# Patient Record
Sex: FEMALE | Race: WHITE | NOT HISPANIC OR LATINO | Employment: FULL TIME | URBAN - METROPOLITAN AREA
[De-identification: names, ages, dates, MRNs, and addresses within clinical notes are randomized per-mention and may not be internally consistent; named-entity substitution may affect disease eponyms.]

---

## 2017-01-09 ENCOUNTER — OFFICE VISIT (OUTPATIENT)
Dept: INTERNAL MEDICINE | Facility: CLINIC | Age: 52
End: 2017-01-09

## 2017-01-09 VITALS
OXYGEN SATURATION: 97 % | HEART RATE: 74 BPM | HEIGHT: 64 IN | DIASTOLIC BLOOD PRESSURE: 90 MMHG | BODY MASS INDEX: 29.59 KG/M2 | SYSTOLIC BLOOD PRESSURE: 132 MMHG | WEIGHT: 173.3 LBS

## 2017-01-09 DIAGNOSIS — J32.0 CHRONIC MAXILLARY SINUSITIS: Primary | ICD-10-CM

## 2017-01-09 DIAGNOSIS — Z00.00 HEALTHCARE MAINTENANCE: ICD-10-CM

## 2017-01-09 PROCEDURE — 99214 OFFICE O/P EST MOD 30 MIN: CPT | Performed by: INTERNAL MEDICINE

## 2017-01-09 RX ORDER — FLUTICASONE PROPIONATE 50 MCG
2 SPRAY, SUSPENSION (ML) NASAL DAILY
Qty: 1 EACH | Refills: 2 | Status: SHIPPED | OUTPATIENT
Start: 2017-01-09 | End: 2017-02-08

## 2017-01-09 NOTE — MR AVS SNAPSHOT
Neville Antelmo   1/9/2017 3:40 PM   Office Visit    Dept Phone:  [phone]   Encounter #:  [id number]    Provider:  Meryl Mesa MD   Department:  [facility] INTERNAL MED AND PEDS                Your Full Care Plan              Today's Medication Changes          These changes are accurate as of: 1/9/17  4:43 PM.  If you have any questions, ask your nurse or doctor.               New Medication(s)Ordered:     fluticasone 50 MCG/ACT nasal spray   Commonly known as:  FLONASE   2 sprays into each nostril Daily for 30 days. Administer 2 sprays in each nostril for each dose.   Started by:  Meryl Mesa MD         Stop taking medication(s)listed here:     amoxicillin-clavulanate 875-125 MG per tablet   Commonly known as:  AUGMENTIN   Stopped by:  Meryl Mesa MD           chlorthalidone 25 MG tablet   Commonly known as:  HYGROTON   Stopped by:  Meryl Mesa MD           montelukast 10 MG tablet   Commonly known as:  SINGULAIR   Stopped by:  Meryl Mesa MD           ondansetron 8 MG tablet   Commonly known as:  ZOFRAN   Stopped by:  Meryl Mesa MD                Where to Get Your Medications      These medications were sent to [address] [place] [address] - [phone] [facility] [phone]   [address] [address]     Phone:  [phone]     fluticasone 50 MCG/ACT nasal spray                  Your Updated Medication List          This list is accurate as of: 1/9/17  4:43 PM.  Always use your most recent med list.                albuterol 108 (90 BASE) MCG/ACT inhaler   Commonly known as:  PROVENTIL HFA;VENTOLIN HFA       ALPRAZolam XR 0.5 MG 24 hr tablet   Commonly known as:  XANAX XR   Take 1 tablet by mouth daily.       buPROPion  MG 24 hr tablet   Commonly known as:  WELLBUTRIN XL       fluticasone 50 MCG/ACT nasal spray   Commonly known as:  FLONASE   2 sprays into each nostril Daily for  "30 days. Administer 2 sprays in each nostril for each dose.       losartan-hydrochlorothiazide 100-12.5 MG per tablet   Commonly known as:  HYZAAR   TAKE 1 TABLET DAILY       venlafaxine XR 75 MG 24 hr capsule   Commonly known as:  EFFEXOR-XR               You Were Diagnosed With        Codes Comments    Chronic maxillary sinusitis    -  Primary ICD-10-CM: J32.0  ICD-9-CM: 473.0     Healthcare maintenance     ICD-10-CM: Z00.00  ICD-9-CM: V70.0       Instructions     None    Patient Instructions History      Upcoming Appointments     Visit Type Date Time Department    SAME DAY 2017  3:40 PM [facility]      Box[employer] Signup     [facility] allows you to send messages to your doctor, view your test results, renew your prescriptions, schedule appointments, and more. To sign up, go to [employer] and click on the Sign Up Now link in the New User? box. Enter your Box[employer] Activation Code exactly as it appears below along with the last four digits of your Social Security Number and your Date of Birth () to complete the sign-up process. If you do not sign up before the expiration date, you must request a new code.    BoxC Activation Code: [email or web address]  Expires: 2017  4:43 PM    If you have questions, you can email [email or web address] or call [phone] to talk to our Box[facility] staff. Remember, [facility] is NOT to be used for urgent needs. For medical emergencies, dial 911.               Other Info from Your Visit           Allergies     Ceftin [Cefuroxime Axetil]      Paroxetine      Ciprofloxacin  Rash    Metronidazole  Rash      Reason for Visit     Sinusitis X1-2months. Constant headache, either completely stopped up or runny nose. No fever.      Vital Signs     Blood Pressure Pulse Height Weight Oxygen Saturation Body Mass Index    132/90 (BP Location: Left arm, Patient Position: Sitting, Cuff Size: Adult) 74 64\" (162.6 cm) 173 lb 4.8 oz (78.6 kg) 97% 29.75 " kg/m2    Smoking Status                   Never Smoker           Problems and Diagnoses Noted     Chronic maxillary sinusitis    -  Primary    Routine medical exam

## 2017-01-09 NOTE — PROGRESS NOTES
Subjective     Neville Rodriges is a 51 y.o. female, who presents with a chief complaint of   Chief Complaint   Patient presents with   • Sinusitis     X1-2months. Constant headache, either completely stopped up or runny nose. No fever.       HPI   The pt has had headache and congestion x 2 mo.  Right now she has lots of PND.  She coughs up a lot in the morning and then it is clear throughout the day.  The neti pot helps sometimes.  She hasnt tried other otc meds.  She doesn't want to take abx if she doesn have to.  No cough or wheeze    Her last mammo was 5 years ago.    Her last c-scope was about 3 years ago and she is due for repeat.  She will call to check on this.     The following portions of the patient's history were reviewed and updated as appropriate: allergies, current medications, past family history, past medical history, past social history, past surgical history and problem list.    Allergies: Ceftin [cefuroxime axetil]; Paroxetine; Ciprofloxacin; and Metronidazole    Review of Systems   Constitutional: Negative.    HENT: Negative.    Eyes: Negative.    Respiratory: Negative.    Cardiovascular: Negative.    Gastrointestinal: Negative.    Endocrine: Negative.    Genitourinary: Negative.    Musculoskeletal: Negative.    Skin: Negative.    Allergic/Immunologic: Negative.    Neurological: Negative.    Hematological: Negative.    Psychiatric/Behavioral: Negative.    All other systems reviewed and are negative.      Objective     Wt Readings from Last 3 Encounters:   01/09/17 173 lb 4.8 oz (78.6 kg)   03/22/16 165 lb 6.4 oz (75 kg)   02/23/16 162 lb 3.2 oz (73.6 kg)     Temp Readings from Last 3 Encounters:   03/22/16 98.4 °F (36.9 °C)   02/23/16 98.3 °F (36.8 °C)   12/02/15 98.2 °F (36.8 °C) (Oral)     BP Readings from Last 3 Encounters:   01/09/17 132/90   03/22/16 122/80   02/23/16 118/76     Pulse Readings from Last 3 Encounters:   01/09/17 74   03/22/16 98   02/23/16 79     Body mass index is 29.75  kg/(m^2).    Physical Exam   Constitutional: She is oriented to person, place, and time. She appears well-developed and well-nourished.   HENT:   Head: Normocephalic and atraumatic.   Right Ear: External ear normal.   Left Ear: External ear normal.   Bilateral serous effusion without erythema   Eyes: Conjunctivae and lids are normal.   Neck: Normal range of motion. Neck supple.   Cardiovascular: Normal rate and regular rhythm.    Pulmonary/Chest: Effort normal and breath sounds normal. No respiratory distress. She has no wheezes.   Musculoskeletal: Normal range of motion. She exhibits no edema.   Lymphadenopathy:     She has cervical adenopathy.   Neurological: She is alert and oriented to person, place, and time. No cranial nerve deficit.   Skin: Skin is warm and dry. No rash noted.   Psychiatric: She has a normal mood and affect. Her behavior is normal. Thought content normal.   Nursing note and vitals reviewed.        Results for orders placed or performed in visit on 09/29/15   Allergens w/Total IgE Area 5   Result Value Ref Range    Class Description      IgE <1 0 - 100 IU/mL    D. pteronyssinus (dust mite) <0.10 Class 0 kU/L    D. farinae (dust mite) <0.10 Class 0 kU/L    Cat Dander <0.10 Class 0 kU/L    Dog Dander, IgE <0.10 Class 0 kU/L    Berm[place] Grass <0.10 Class 0 kU/L    Tez Grass <0.10 Class 0 kU/L    Cockroach,Malaysian <0.10 Class 0 kU/L    Penicillium chrysogen <0.10 Class 0 kU/L    Cladosporium herbarum <0.10 Class 0 kU/L    Aspergillus fumigatus <0.10 Class 0 kU/L    Alternaria alternata <0.10 Class 0 kU/L    Maple/[place] <0.10 Class 0 kU/L    Birch, Silver <0.10 Class 0 kU/L    [place], Mountain <0.10 Class 0 kU/L    [place], White <0.10 Class 0 kU/L    Elm, American <0.10 Class 0 kU/L    Shoals, Pollen <0.10 Class 0 kU/L    Maple [place] <0.10 Class 0 kU/L    [place] Tree <0.10 Class 0 kU/L    [place], White <0.10 Class 0 kU/L    Pecan/[place] Tree <0.10 Class 0 kU/L    White [place] <0.10  Class 0 kU/L    Ragweed, Common/Short <0.10 Class 0 kU/L    Russian Thistle <0.10 Class 0 kU/L    Pigweed, Rough/Common <0.10 Class 0 kU/L    Sheep Sorrel <0.10 Class 0 kU/L    Mouse Urine <0.10 Class 0 kU/L   CBC auto differential   Result Value Ref Range    WBC 4.89 4.80 - 10.80 K/cumm    RBC 4.77 4.20 - 5.40 Million    Hemoglobin 14.1 12.0 - 16.0 g/dL    Hematocrit 44.0 37.0 - 47.0 %    MCV 92.2 81.0 - 99.0 fL    MCH 29.6 27.0 - 31.0 pg    MCHC 32.0 31.0 - 37.0 g/dL    RDW 12.3 11.5 - 14.5 %    Platelets 243 140 - 500 K/cumm    Neutrophil Rel % 56 45 - 70 %    Lymphocyte Rel % 33 20 - 45 %    Monocyte Rel % 8 3 - 8 %    Eosinophil Rel % 2 0 - 4 %    Basophil Rel % 1 0 - 2 %    Neutrophils Absolute 2.72 1.50 - 8.30 K/cumm    Lymphocytes Absolute 1.61 0.60 - 4.80 K/cumm    Monocytes Absolute 0.39 0.00 - 1.00 K/cumm    Eosinophils Absolute 0.10 0.10 - 0.30 K/cumm    Basophils Absolute 0.06 0.00 - 0.20 K/cumm    Immature Granulocyte Rel % 0.0 0.0 - 0.6 %   Comprehensive metabolic panel   Result Value Ref Range    Glucose 84 65 - 99 mg/dL    BUN 13 6 - 20 mg/dL    Creatinine 0.73 0.57 - 1.00 mg/dL    eGFR Non African Am >60 mL/min/1.732    eGFR African Am >60 mL/min/1.732    BUN/Creatinine Ratio 18     Sodium 142 136 - 145 mmol/L    Potassium 4.9 3.5 - 5.2 mmol/L    Chloride 103 98 - 107 mmol/L    Total CO2 29 22 - 29 mmol/L    Calcium 8.8 8.6 - 10.5 mg/dL    Total Protein 6.6 6.0 - 8.5 g/dL    Albumin 4.4 3.5 - 5.2 g/dL    Globulin 2.2 g/dL    A/G Ratio 2     Total Bilirubin 0.4 0.1 - 1.2 mg/dL    Alkaline Phosphatase 69 40 - 129 U/L    AST (SGOT) 17 5 - 32 U/L    ALT (SGPT) 12 5 - 33 U/L   Lipid panel   Result Value Ref Range    Total Cholesterol 187 0 - 200 mg/dL    Triglycerides 139 0 - 150 mg/dL    HDL Cholesterol 52 40 - 60 mg/dL    VLDL Cholesterol 28 (H) 7 - 27 mg/dL    LDL Cholesterol  107 (H) 0 - 100 mg/dL   Vitamin D 25 hydroxy   Result Value Ref Range    25 Hydroxy, Vitamin D 30.1 30.0 - 100.0 ng/mL   TSH  Rfx on Abnormal to Free T4 ([facility])   Result Value Ref Range    TSH 2.130 0.270 - 4.200 uIU/mL       Assessment/Plan   Neville was seen today for sinusitis.    Diagnoses and all orders for this visit:    Chronic maxillary sinusitis  -     fluticasone (FLONASE) 50 MCG/ACT nasal spray; 2 sprays into each nostril Daily for 30 days. Administer 2 sprays in each nostril for each dose.    Add zyrtec/benadryl and sudafed PRN.  If no better in 1 week treat with antibiotics.     Healthcare maintenance  -     Mammo Screening Bilateral With CAD; Future          Current Outpatient Prescriptions:   •  albuterol (PROVENTIL HFA;VENTOLIN HFA) 108 (90 BASE) MCG/ACT inhaler, Ventolin  (90 Base) MCG/ACT Inhalation Aerosol Solution; Patient Sig: Ventolin  (90 Base) MCG/ACT Inhalation Aerosol Solution INHALE 2-4 PUFFS Q 4 HOURS PRN/COUGH/WHEEZE; 1; 0; 21-Aug-2015; Active, Disp: , Rfl:   •  ALPRAZolam XR (XANAX XR) 0.5 MG 24 hr tablet, Take 1 tablet by mouth daily., Disp: 30 tablet, Rfl: 0  •  buPROPion XL (WELLBUTRIN XL) 150 MG 24 hr tablet, Take 1 tablet by mouth daily., Disp: , Rfl:   •  losartan-hydrochlorothiazide (HYZAAR) 100-12.5 MG per tablet, TAKE 1 TABLET DAILY, Disp: 90 tablet, Rfl: 2  •  venlafaxine XR (EFFEXOR-XR) 75 MG 24 hr capsule, Take 1 capsule by mouth daily., Disp: , Rfl:   •  fluticasone (FLONASE) 50 MCG/ACT nasal spray, 2 sprays into each nostril Daily for 30 days. Administer 2 sprays in each nostril for each dose., Disp: 1 each, Rfl: 2  Medications Discontinued During This Encounter   Medication Reason   • amoxicillin-clavulanate (AUGMENTIN) 875-125 MG per tablet    • chlorthalidone (HYGROTEN) 25 MG tablet    • montelukast (SINGULAIR) 10 MG tablet    • ondansetron (ZOFRAN) 8 MG tablet        Return if symptoms worsen or fail to improve.

## 2017-01-23 ENCOUNTER — TELEPHONE (OUTPATIENT)
Dept: INTERNAL MEDICINE | Facility: CLINIC | Age: 52
End: 2017-01-23

## 2017-01-23 RX ORDER — DOXYCYCLINE HYCLATE 100 MG/1
100 TABLET, DELAYED RELEASE ORAL 2 TIMES DAILY
Qty: 20 TABLET | Refills: 0 | Status: SHIPPED | OUTPATIENT
Start: 2017-01-23 | End: 2017-02-28

## 2017-01-23 NOTE — TELEPHONE ENCOUNTER
Patient calls this AM, she is requesting Cipro. She says she was seen last week for Sinusitis and took her meds but she is not any better. Per Dr. Mesa, Cipro not appropriate for Sinusitis, can have Doxycycline, amoxicillin, or z-nguyen. Patient would like to have Doxycycline. Sent to [facility] in [place].

## 2017-01-26 ENCOUNTER — TRANSCRIBE ORDERS (OUTPATIENT)
Dept: INTERNAL MEDICINE | Facility: CLINIC | Age: 52
End: 2017-01-26

## 2017-01-26 DIAGNOSIS — Z13.9 SCREENING: Primary | ICD-10-CM

## 2017-01-31 ENCOUNTER — APPOINTMENT (OUTPATIENT)
Dept: MAMMOGRAPHY | Facility: HOSPITAL | Age: 52
End: 2017-01-31
Attending: INTERNAL MEDICINE

## 2017-02-01 ENCOUNTER — HOSPITAL ENCOUNTER (OUTPATIENT)
Dept: MAMMOGRAPHY | Facility: HOSPITAL | Age: 52
Discharge: HOME | End: 2017-02-01
Attending: INTERNAL MEDICINE | Admitting: INTERNAL MEDICINE

## 2017-02-01 DIAGNOSIS — Z13.9 SCREENING: ICD-10-CM

## 2017-02-01 PROCEDURE — 77063 BREAST TOMOSYNTHESIS BI: CPT

## 2017-02-01 PROCEDURE — G0202 SCR MAMMO BI INCL CAD: HCPCS

## 2017-02-10 ENCOUNTER — TELEPHONE (OUTPATIENT)
Dept: INTERNAL MEDICINE | Facility: CLINIC | Age: 52
End: 2017-02-10

## 2017-02-10 NOTE — TELEPHONE ENCOUNTER
----- Message from Meryl Mesa MD sent at 2/9/2017 11:16 PM EST -----  Mammogram normal.  Repeat 1 year

## 2017-02-28 ENCOUNTER — OFFICE VISIT (OUTPATIENT)
Dept: INTERNAL MEDICINE | Facility: CLINIC | Age: 52
End: 2017-02-28

## 2017-02-28 ENCOUNTER — OFFICE VISIT (OUTPATIENT)
Dept: CARDIOLOGY | Facility: CLINIC | Age: 52
End: 2017-02-28

## 2017-02-28 VITALS
BODY MASS INDEX: 29.53 KG/M2 | HEART RATE: 80 BPM | SYSTOLIC BLOOD PRESSURE: 122 MMHG | HEIGHT: 64 IN | OXYGEN SATURATION: 98 % | DIASTOLIC BLOOD PRESSURE: 74 MMHG | WEIGHT: 173 LBS

## 2017-02-28 VITALS
SYSTOLIC BLOOD PRESSURE: 126 MMHG | HEIGHT: 64 IN | DIASTOLIC BLOOD PRESSURE: 84 MMHG | BODY MASS INDEX: 29.53 KG/M2 | HEART RATE: 70 BPM | WEIGHT: 173 LBS

## 2017-02-28 DIAGNOSIS — R00.2 PALPITATIONS: Primary | ICD-10-CM

## 2017-02-28 DIAGNOSIS — I10 ESSENTIAL HYPERTENSION: ICD-10-CM

## 2017-02-28 DIAGNOSIS — Z00.00 HEALTHCARE MAINTENANCE: ICD-10-CM

## 2017-02-28 DIAGNOSIS — H65.91 MIDDLE EAR EFFUSION, RIGHT: Primary | ICD-10-CM

## 2017-02-28 DIAGNOSIS — K21.9 GASTROESOPHAGEAL REFLUX DISEASE, ESOPHAGITIS PRESENCE NOT SPECIFIED: ICD-10-CM

## 2017-02-28 PROCEDURE — 93000 ELECTROCARDIOGRAM COMPLETE: CPT | Performed by: INTERNAL MEDICINE

## 2017-02-28 PROCEDURE — 99214 OFFICE O/P EST MOD 30 MIN: CPT | Performed by: INTERNAL MEDICINE

## 2017-02-28 RX ORDER — CARVEDILOL 6.25 MG/1
6.25 TABLET ORAL
COMMUNITY
End: 2017-12-04

## 2017-02-28 RX ORDER — RANITIDINE 150 MG/1
150 TABLET ORAL 2 TIMES DAILY
Qty: 180 TABLET | Refills: 1 | Status: SHIPPED | OUTPATIENT
Start: 2017-02-28 | End: 2017-10-17 | Stop reason: HOSPADM

## 2017-02-28 RX ORDER — AZELASTINE HCL 205.5 UG/1
2 SPRAY NASAL 2 TIMES DAILY
Qty: 30 ML | Refills: 1 | Status: SHIPPED | OUTPATIENT
Start: 2017-02-28 | End: 2023-01-11

## 2017-02-28 NOTE — PROGRESS NOTES
Subjective     Neville Rodriges is a 51 y.o. female, who presents with a chief complaint of   Chief Complaint   Patient presents with   • Earache     Was seen 3 weeks ago for a sinus infection, she has taken all meds and she is still having problems with her r ear.       HPI   The pt had a sinus infection a few weeks ago.  Her sinuses feel better, but her ear is still stopped up.  No fever.  She is on zyrtec daily.       Gerd - she is on a PPI and concerned about long term effects on her kidneys.    htn - well controlled.    The following portions of the patient's history were reviewed and updated as appropriate: allergies, current medications, past family history, past medical history, past social history, past surgical history and problem list.    Allergies: Ceftin [cefuroxime axetil]; Paroxetine; Ciprofloxacin; and Metronidazole    Review of Systems   Constitutional: Negative.    HENT: Positive for ear pain.    Eyes: Negative.    Respiratory: Negative.    Cardiovascular: Negative.    Gastrointestinal: Negative.    Endocrine: Negative.    Genitourinary: Negative.    Musculoskeletal: Negative.    Skin: Negative.    Allergic/Immunologic: Negative.    Neurological: Negative.    Hematological: Negative.    Psychiatric/Behavioral: Negative.    All other systems reviewed and are negative.      Objective     Wt Readings from Last 3 Encounters:   02/28/17 173 lb (78.5 kg)   01/09/17 173 lb 4.8 oz (78.6 kg)   03/22/16 165 lb 6.4 oz (75 kg)     Temp Readings from Last 3 Encounters:   03/22/16 98.4 °F (36.9 °C)   02/23/16 98.3 °F (36.8 °C)   12/02/15 98.2 °F (36.8 °C) (Oral)     BP Readings from Last 3 Encounters:   02/28/17 122/74   01/09/17 132/90   03/22/16 122/80     Pulse Readings from Last 3 Encounters:   02/28/17 80   01/09/17 74   03/22/16 98     Body mass index is 29.7 kg/(m^2).  SpO2 Readings from Last 3 Encounters:   02/28/17 98%   01/09/17 97%   02/23/16 98%       Physical Exam   Constitutional: She is oriented  to person, place, and time. She appears well-developed and well-nourished. No distress.   HENT:   Head: Normocephalic and atraumatic.   Right Ear: External ear normal.   Left Ear: External ear normal.   Nose: Nose normal.   Mouth/Throat: Oropharynx is clear and moist.   Right ear with effusion   Eyes: Conjunctivae and EOM are normal. Pupils are equal, round, and reactive to light.   Neck: Normal range of motion. Neck supple.   Cardiovascular: Normal rate, regular rhythm, normal heart sounds and intact distal pulses.    Pulmonary/Chest: Effort normal and breath sounds normal. No respiratory distress. She has no wheezes.   Musculoskeletal: Normal range of motion.   Normal gait   Neurological: She is alert and oriented to person, place, and time.   Skin: Skin is warm and dry.   Psychiatric: She has a normal mood and affect. Her behavior is normal. Judgment and thought content normal.   Nursing note and vitals reviewed.      Results for orders placed or performed in visit on 09/29/15   Allergens w/Total IgE Area 5   Result Value Ref Range    Class Description      IgE <1 0 - 100 IU/mL    D. pteronyssinus (dust mite) <0.10 Class 0 kU/L    D. farinae (dust mite) <0.10 Class 0 kU/L    Cat Dander <0.10 Class 0 kU/L    Dog Dander, IgE <0.10 Class 0 kU/L    [place] Grass <0.10 Class 0 kU/L    Tez Grass <0.10 Class 0 kU/L    Cockroach,S[place] Arabian <0.10 Class 0 kU/L    Penicillium chrysogen <0.10 Class 0 kU/L    Cladosporium herbarum <0.10 Class 0 kU/L    Aspergillus fumigatus <0.10 Class 0 kU/L    Alternaria alternata <0.10 Class 0 kU/L    Maple/[place] <0.10 Class 0 kU/L    Birch, Silver <0.10 Class 0 kU/L    [place], Mountain <0.10 Class 0 kU/L    [place], White <0.10 Class 0 kU/L    Elm, American <0.10 Class 0 kU/L    [place], Pollen <0.10 Class 0 kU/L    Maple [employer] [place] <0.10 Class 0 kU/L    [place] Tree <0.10 Class 0 kU/L    [place], White <0.10 Class 0 kU/L    Pecan/[place] Tree <0.10 Class 0 kU/L    White [place] <0.10  Class 0 kU/L    Ragweed, Common/Short <0.10 Class 0 kU/L    Russian Thistle <0.10 Class 0 kU/L    Pigweed, Rough/Common <0.10 Class 0 kU/L    Sheep Sorrel <0.10 Class 0 kU/L    Mouse Urine <0.10 Class 0 kU/L   CBC auto differential   Result Value Ref Range    WBC 4.89 4.80 - 10.80 K/cumm    RBC 4.77 4.20 - 5.40 Million    Hemoglobin 14.1 12.0 - 16.0 g/dL    Hematocrit 44.0 37.0 - 47.0 %    MCV 92.2 81.0 - 99.0 fL    MCH 29.6 27.0 - 31.0 pg    MCHC 32.0 31.0 - 37.0 g/dL    RDW 12.3 11.5 - 14.5 %    Platelets 243 140 - 500 K/cumm    Neutrophil Rel % 56 45 - 70 %    Lymphocyte Rel % 33 20 - 45 %    Monocyte Rel % 8 3 - 8 %    Eosinophil Rel % 2 0 - 4 %    Basophil Rel % 1 0 - 2 %    Neutrophils Absolute 2.72 1.50 - 8.30 K/cumm    Lymphocytes Absolute 1.61 0.60 - 4.80 K/cumm    Monocytes Absolute 0.39 0.00 - 1.00 K/cumm    Eosinophils Absolute 0.10 0.10 - 0.30 K/cumm    Basophils Absolute 0.06 0.00 - 0.20 K/cumm    Immature Granulocyte Rel % 0.0 0.0 - 0.6 %   Comprehensive metabolic panel   Result Value Ref Range    Glucose 84 65 - 99 mg/dL    BUN 13 6 - 20 mg/dL    Creatinine 0.73 0.57 - 1.00 mg/dL    eGFR Non African Am >60 mL/min/1.732    eGFR African Am >60 mL/min/1.732    BUN/Creatinine Ratio 18     Sodium 142 136 - 145 mmol/L    Potassium 4.9 3.5 - 5.2 mmol/L    Chloride 103 98 - 107 mmol/L    Total CO2 29 22 - 29 mmol/L    Calcium 8.8 8.6 - 10.5 mg/dL    Total Protein 6.6 6.0 - 8.5 g/dL    Albumin 4.4 3.5 - 5.2 g/dL    Globulin 2.2 g/dL    A/G Ratio 2     Total Bilirubin 0.4 0.1 - 1.2 mg/dL    Alkaline Phosphatase 69 40 - 129 U/L    AST (SGOT) 17 5 - 32 U/L    ALT (SGPT) 12 5 - 33 U/L   Lipid panel   Result Value Ref Range    Total Cholesterol 187 0 - 200 mg/dL    Triglycerides 139 0 - 150 mg/dL    HDL Cholesterol 52 40 - 60 mg/dL    VLDL Cholesterol 28 (H) 7 - 27 mg/dL    LDL Cholesterol  107 (H) 0 - 100 mg/dL   Vitamin D 25 hydroxy   Result Value Ref Range    25 Hydroxy, Vitamin D 30.1 30.0 - 100.0 ng/mL   TSH  Rfx on Abnormal to Free T4 ([facility])   Result Value Ref Range    TSH 2.130 0.270 - 4.200 uIU/mL       Assessment/Plan   Neville was seen today for earache.    Diagnoses and all orders for this visit:    Middle ear effusion, right  -     azelastine (ASTEPRO) 0.15 % solution nasal spray; 2 sprays into each nostril 2 (Two) Times a Day.    Healthcare maintenance  -     Comprehensive Metabolic Panel; Future  -     CBC & Differential; Future  -     T4, Free; Future  -     TSH; Future  -     Lipid Panel With LDL / HDL Ratio; Future    Essential hypertension  -     Comprehensive Metabolic Panel; Future  -     CBC & Differential; Future  -     T4, Free; Future  -     TSH; Future  -     Lipid Panel With LDL / HDL Ratio; Future    Gastroesophageal reflux disease, esophagitis presence not specified  -     Comprehensive Metabolic Panel; Future  -     raNITIdine (ZANTAC) 150 MG tablet; Take 1 tablet by mouth 2 (Two) Times a Day.          Outpatient Medications Prior to Visit   Medication Sig Dispense Refill   • albuterol (PROVENTIL HFA;VENTOLIN HFA) 108 (90 BASE) MCG/ACT inhaler Ventolin  (90 Base) MCG/ACT Inhalation Aerosol Solution; Patient Sig: Ventolin  (90 Base) MCG/ACT Inhalation Aerosol Solution INHALE 2-4 PUFFS Q 4 HOURS PRN/COUGH/WHEEZE; 1; 0; 21-Aug-2015; Active     • ALPRAZolam XR (XANAX XR) 0.5 MG 24 hr tablet Take 1 tablet by mouth daily. 30 tablet 0   • buPROPion XL (WELLBUTRIN XL) 150 MG 24 hr tablet Take 1 tablet by mouth daily.     • doxycycline (DORYX) 100 MG enteric coated tablet Take 1 tablet by mouth 2 (Two) Times a Day. 20 tablet 0   • losartan-hydrochlorothiazide (HYZAAR) 100-12.5 MG per tablet TAKE 1 TABLET DAILY 90 tablet 2   • venlafaxine XR (EFFEXOR-XR) 75 MG 24 hr capsule Take 1 capsule by mouth daily.       No facility-administered medications prior to visit.      New Medications Ordered This Visit   Medications   • raNITIdine (ZANTAC) 150 MG tablet     Sig: Take 1 tablet by mouth 2  (Two) Times a Day.     Dispense:  180 tablet     Refill:  1   • azelastine (ASTEPRO) 0.15 % solution nasal spray     Si sprays into each nostril 2 (Two) Times a Day.     Dispense:  30 mL     Refill:  1   There are no discontinued medications.      Return if symptoms worsen or fail to improve.

## 2017-03-01 ENCOUNTER — LAB (OUTPATIENT)
Dept: LAB | Facility: HOSPITAL | Age: 52
End: 2017-03-01
Attending: INTERNAL MEDICINE

## 2017-03-01 DIAGNOSIS — K21.9 GASTROESOPHAGEAL REFLUX DISEASE, ESOPHAGITIS PRESENCE NOT SPECIFIED: ICD-10-CM

## 2017-03-01 DIAGNOSIS — Z00.00 HEALTHCARE MAINTENANCE: ICD-10-CM

## 2017-03-01 DIAGNOSIS — I10 ESSENTIAL HYPERTENSION: ICD-10-CM

## 2017-03-01 LAB
ALBUMIN SERPL-MCNC: 4.3 G/DL (ref 3.5–5.2)
ALBUMIN/GLOB SERPL: 1.6 G/DL
ALP SERPL-CCNC: 67 U/L (ref 40–129)
ALT SERPL W P-5'-P-CCNC: 12 U/L (ref 5–33)
ANION GAP SERPL CALCULATED.3IONS-SCNC: 9.6 MMOL/L
AST SERPL-CCNC: 15 U/L (ref 5–32)
BASOPHILS # BLD AUTO: 0.06 10*3/MM3 (ref 0–0.2)
BASOPHILS NFR BLD AUTO: 1.1 % (ref 0–2)
BILIRUB SERPL-MCNC: 0.3 MG/DL (ref 0.2–1.2)
BUN BLD-MCNC: 12 MG/DL (ref 6–20)
BUN/CREAT SERPL: 12.6 (ref 7–25)
CALCIUM SPEC-SCNC: 9.2 MG/DL (ref 8.6–10.5)
CHLORIDE SERPL-SCNC: 102 MMOL/L (ref 98–107)
CO2 SERPL-SCNC: 29.4 MMOL/L (ref 22–29)
CREAT BLD-MCNC: 0.95 MG/DL (ref 0.57–1)
DEPRECATED RDW RBC AUTO: 40.4 FL (ref 37–54)
EOSINOPHIL # BLD AUTO: 0.14 10*3/MM3 (ref 0.1–0.3)
EOSINOPHIL NFR BLD AUTO: 2.6 % (ref 0–4)
ERYTHROCYTE [DISTWIDTH] IN BLOOD BY AUTOMATED COUNT: 12.4 % (ref 11.5–14.5)
GFR SERPL CREATININE-BSD FRML MDRD: 62 ML/MIN/1.73
GLOBULIN UR ELPH-MCNC: 2.7 GM/DL
GLUCOSE BLD-MCNC: 100 MG/DL (ref 65–99)
HCT VFR BLD AUTO: 42 % (ref 37–47)
HGB BLD-MCNC: 13.9 G/DL (ref 12–16)
IMM GRANULOCYTES # BLD: 0.01 10*3/MM3 (ref 0–0.03)
IMM GRANULOCYTES NFR BLD: 0.2 % (ref 0–0.5)
LYMPHOCYTES # BLD AUTO: 1.94 10*3/MM3 (ref 0.6–4.8)
LYMPHOCYTES NFR BLD AUTO: 35.7 % (ref 20–45)
MCH RBC QN AUTO: 29.3 PG (ref 27–31)
MCHC RBC AUTO-ENTMCNC: 33.1 G/DL (ref 31–37)
MCV RBC AUTO: 88.6 FL (ref 81–99)
MONOCYTES # BLD AUTO: 0.39 10*3/MM3 (ref 0–1)
MONOCYTES NFR BLD AUTO: 7.2 % (ref 3–8)
NEUTROPHILS # BLD AUTO: 2.9 10*3/MM3 (ref 1.5–8.3)
NEUTROPHILS NFR BLD AUTO: 53.2 % (ref 45–70)
NRBC BLD MANUAL-RTO: 0 /100 WBC (ref 0–0)
PLATELET # BLD AUTO: 261 10*3/MM3 (ref 140–500)
PMV BLD AUTO: 9.3 FL (ref 7.4–10.4)
POTASSIUM BLD-SCNC: 4.4 MMOL/L (ref 3.5–5.2)
PROT SERPL-MCNC: 7 G/DL (ref 6–8.5)
RBC # BLD AUTO: 4.74 10*6/MM3 (ref 4.2–5.4)
SODIUM BLD-SCNC: 141 MMOL/L (ref 136–145)
T4 FREE SERPL-MCNC: 0.96 NG/DL (ref 0.93–1.7)
TSH SERPL DL<=0.05 MIU/L-ACNC: 2.63 MIU/ML (ref 0.27–4.2)
WBC NRBC COR # BLD: 5.44 10*3/MM3 (ref 4.8–10.8)

## 2017-03-01 PROCEDURE — 80061 LIPID PANEL: CPT

## 2017-03-01 PROCEDURE — 84439 ASSAY OF FREE THYROXINE: CPT

## 2017-03-01 PROCEDURE — 85025 COMPLETE CBC W/AUTO DIFF WBC: CPT

## 2017-03-01 PROCEDURE — 80053 COMPREHEN METABOLIC PANEL: CPT

## 2017-03-01 PROCEDURE — 36415 COLL VENOUS BLD VENIPUNCTURE: CPT

## 2017-03-01 PROCEDURE — 84443 ASSAY THYROID STIM HORMONE: CPT

## 2017-03-02 LAB
CHOLEST SERPL-MCNC: 221 MG/DL (ref 100–199)
HDLC SERPL-MCNC: 51 MG/DL
LDLC SERPL CALC-MCNC: 131 MG/DL (ref 0–99)
LDLC/HDLC SERPL: 2.6 RATIO UNITS (ref 0–3.2)
TRIGL SERPL-MCNC: 194 MG/DL (ref 0–149)
VLDLC SERPL-MCNC: 39 MG/DL (ref 5–40)

## 2017-03-03 ENCOUNTER — TELEPHONE (OUTPATIENT)
Dept: INTERNAL MEDICINE | Facility: CLINIC | Age: 52
End: 2017-03-03

## 2017-03-03 NOTE — TELEPHONE ENCOUNTER
Patient has been advised and voiced understanding.    ----- Message from Meryl Mesa MD sent at 3/3/2017  8:32 AM EST -----  Call pt about labs. Cholesterol up some.  Fasting glucose 100.  Work on healthy diet and exercise.  Recheck 1 year

## 2017-03-05 ENCOUNTER — RESULTS ENCOUNTER (OUTPATIENT)
Dept: INTERNAL MEDICINE | Facility: CLINIC | Age: 52
End: 2017-03-05

## 2017-03-05 DIAGNOSIS — Z00.00 HEALTHCARE MAINTENANCE: ICD-10-CM

## 2017-03-05 DIAGNOSIS — I10 ESSENTIAL HYPERTENSION: ICD-10-CM

## 2017-03-20 ENCOUNTER — OFFICE VISIT (OUTPATIENT)
Dept: ORTHOPEDIC SURGERY | Facility: CLINIC | Age: 52
End: 2017-03-20

## 2017-03-20 VITALS
HEIGHT: 64 IN | BODY MASS INDEX: 28.68 KG/M2 | SYSTOLIC BLOOD PRESSURE: 135 MMHG | WEIGHT: 168 LBS | HEART RATE: 81 BPM | DIASTOLIC BLOOD PRESSURE: 84 MMHG

## 2017-03-20 DIAGNOSIS — S93.491A SPRAIN OF OTHER LIGAMENT OF RIGHT ANKLE, INITIAL ENCOUNTER: ICD-10-CM

## 2017-03-20 DIAGNOSIS — R52 PAIN: Primary | ICD-10-CM

## 2017-03-20 PROBLEM — S93.401A SPRAIN OF RIGHT ANKLE: Status: ACTIVE | Noted: 2017-03-20

## 2017-03-20 PROCEDURE — 99203 OFFICE O/P NEW LOW 30 MIN: CPT | Performed by: NURSE PRACTITIONER

## 2017-03-20 PROCEDURE — 73610 X-RAY EXAM OF ANKLE: CPT | Performed by: NURSE PRACTITIONER

## 2017-03-20 RX ORDER — MELOXICAM 15 MG/1
15 TABLET ORAL DAILY
COMMUNITY
End: 2017-03-20

## 2017-03-20 RX ORDER — CALCIUM CARBONATE 160(400)MG
1 TABLET,CHEWABLE ORAL
Status: CANCELLED | OUTPATIENT
Start: 2017-03-20

## 2017-03-20 RX ORDER — OMEPRAZOLE 20 MG/1
20 CAPSULE, DELAYED RELEASE ORAL DAILY
COMMUNITY
End: 2017-03-20

## 2017-03-30 RX ORDER — LOSARTAN POTASSIUM AND HYDROCHLOROTHIAZIDE 12.5; 1 MG/1; MG/1
TABLET ORAL
Qty: 90 TABLET | Refills: 3 | Status: SHIPPED | OUTPATIENT
Start: 2017-03-30 | End: 2018-03-30 | Stop reason: SDUPTHER

## 2017-10-17 ENCOUNTER — OFFICE VISIT (OUTPATIENT)
Dept: OBSTETRICS AND GYNECOLOGY | Facility: CLINIC | Age: 52
End: 2017-10-17

## 2017-10-17 VITALS
SYSTOLIC BLOOD PRESSURE: 130 MMHG | BODY MASS INDEX: 28.77 KG/M2 | DIASTOLIC BLOOD PRESSURE: 88 MMHG | HEIGHT: 64 IN | WEIGHT: 168.5 LBS

## 2017-10-17 DIAGNOSIS — L90.0 LICHEN SCLEROSUS: ICD-10-CM

## 2017-10-17 DIAGNOSIS — Z12.39 SCREENING FOR BREAST CANCER: ICD-10-CM

## 2017-10-17 DIAGNOSIS — Z01.419 WELL WOMAN EXAM WITH ROUTINE GYNECOLOGICAL EXAM: ICD-10-CM

## 2017-10-17 DIAGNOSIS — N76.0 ACUTE VAGINITIS: ICD-10-CM

## 2017-10-17 DIAGNOSIS — Z00.00 ANNUAL PHYSICAL EXAM: Primary | ICD-10-CM

## 2017-10-17 PROCEDURE — 99396 PREV VISIT EST AGE 40-64: CPT | Performed by: NURSE PRACTITIONER

## 2017-10-17 PROCEDURE — 99212 OFFICE O/P EST SF 10 MIN: CPT | Performed by: NURSE PRACTITIONER

## 2017-10-17 RX ORDER — OMEPRAZOLE 20 MG/1
20 CAPSULE, DELAYED RELEASE ORAL DAILY
COMMUNITY
End: 2017-12-04 | Stop reason: SDUPTHER

## 2017-10-17 NOTE — PROGRESS NOTES
GYN Annual Exam     Chief Complaint   Patient presents with   • Personal Problem     Vaginal itching       Neville Rodriges is a 52 y.o. female who presents for annual well woman exam. Periods are absent  S/P Partial hysterectomy for benign reasons (per pt report). No intermenstrual bleeding, spotting, or discharge. She had a normal MMG . Does not check her breast at home. She presents with complaints of vaginal itching. Has had vaginal itching for a few weeks. Denies change in discharge. Has treated herself with Estrace cream and monistat with no relief. Sexually active, sex is painful. Notices that her vaginal area is very dry and it burns.          OB History      Para Term  AB Living    2 2 2       SAB TAB Ectopic Multiple Live Births                  Current contraception: status post hysterectomy  History of abnormal Pap smear: no  Family history of uterine, colon or ovarian cancer: no  History of abnormal mammogram: no  Family history of breast cancer: no  Last Pap : several years ago     Past Medical History:   Diagnosis Date   • Colon polyps     c-scope was fall . due for repeat in 2-3 years.   • Depression    • Gastroesophageal reflux disease 2017   • Headache     More problems when big fluctuations in weather   • Humerus fracture    • Hypercholesterolemia     High Triglycerides   • Hypertension    • Sleep apnea        Past Surgical History:   Procedure Laterality Date   • APPENDECTOMY     • CHOLECYSTECTOMY     • HYSTERECTOMY      Partial   • SINUS SURGERY     • STOMACH SURGERY  12/10/2013    Gastric sleve   • TONSILLECTOMY           Current Outpatient Prescriptions:   •  omeprazole (priLOSEC) 20 MG capsule, Take 20 mg by mouth Daily., Disp: , Rfl:   •  albuterol (PROVENTIL HFA;VENTOLIN HFA) 108 (90 BASE) MCG/ACT inhaler, Ventolin  (90 Base) MCG/ACT Inhalation Aerosol Solution; Patient Sig: Ventolin  (90 Base) MCG/ACT Inhalation Aerosol Solution INHALE 2-4 PUFFS Q 4  "HOURS PRN/COUGH/WHEEZE; 1; 0; 21-Aug-2015; Active, Disp: , Rfl:   •  ALPRAZolam XR (XANAX XR) 0.5 MG 24 hr tablet, Take 1 tablet by mouth daily., Disp: 30 tablet, Rfl: 0  •  azelastine (ASTEPRO) 0.15 % solution nasal spray, 2 sprays into each nostril 2 (Two) Times a Day., Disp: 30 mL, Rfl: 1  •  buPROPion XL (WELLBUTRIN XL) 150 MG 24 hr tablet, Take 1 tablet by mouth daily., Disp: , Rfl:   •  carvedilol (COREG) 6.25 MG tablet, Take 6.25 mg by mouth. PT REPORTS TAKING ONE HALF TABLET DAILY, Disp: , Rfl:   •  Diclofenac Sodium (PENNSAID) 2 % solution, Place 1 application on the skin 2 (Two) Times a Day As Needed (pain)., Disp: 112 g, Rfl: 3  •  losartan-hydrochlorothiazide (HYZAAR) 100-12.5 MG per tablet, TAKE 1 TABLET DAILY, Disp: 90 tablet, Rfl: 3  •  venlafaxine XR (EFFEXOR-XR) 75 MG 24 hr capsule, Take 1 capsule by mouth daily., Disp: , Rfl:     Allergies   Allergen Reactions   • Ceftin [Cefuroxime Axetil]    • Paroxetine    • Ciprofloxacin Rash   • Metronidazole Rash       Social History   Substance Use Topics   • Smoking status: Never Smoker   • Smokeless tobacco: None   • Alcohol use 0.6 oz/week     1 Glasses of wine per week      Comment: 2 X PER MONTH        Family History   Problem Relation Age of Onset   • Diabetes Mother    • Anxiety disorder Mother    • Depression Mother    • Diabetes Father    • Heart disease Father      CABG x 3 in early 60s   • Hypertension Father    • Anxiety disorder Sister    • Depression Sister    • Cancer Daughter      Synovial Cell Sarcoma       Review of Systems   Constitutional: Negative.    Respiratory: Negative.    Cardiovascular: Negative.    Gastrointestinal: Negative.    Endocrine: Negative.    Genitourinary:        Vaginal itching    Musculoskeletal: Negative.    Skin: Negative.    Neurological: Negative.    Psychiatric/Behavioral: Negative.        /88  Ht 64\" (162.6 cm)  Wt 168 lb 8 oz (76.4 kg)  LMP  (LMP Unknown)  Breastfeeding? No  BMI 28.92 " kg/m2    Physical Exam   Constitutional: She is oriented to person, place, and time. She appears well-developed and well-nourished.   Neck: Normal range of motion. Neck supple. No thyromegaly present.   Cardiovascular: Normal rate and regular rhythm.    Pulmonary/Chest: Effort normal and breath sounds normal. Right breast exhibits no inverted nipple, no mass, no nipple discharge, no skin change and no tenderness. Left breast exhibits no inverted nipple, no mass, no nipple discharge, no skin change and no tenderness.   Abdominal: Soft. Bowel sounds are normal.   Genitourinary: Rectum normal.       Pelvic exam was performed with patient supine. There is lesion on the right labia. There is no rash, tenderness or injury on the right labia. There is lesion on the left labia. There is no rash, tenderness or injury on the left labia. Uterus is not deviated, not enlarged, not fixed and not tender. Cervix exhibits no motion tenderness, no discharge and no friability. Right adnexum displays no mass, no tenderness and no fullness. Left adnexum displays no mass, no tenderness and no fullness. No erythema, tenderness or bleeding in the vagina. No foreign body in the vagina. No signs of injury around the vagina. No vaginal discharge found.   Neurological: She is alert and oriented to person, place, and time.   Skin: Skin is warm and dry.   Psychiatric: She has a normal mood and affect. Her behavior is normal.   Vitals reviewed.         Assessment     1) GYN annual well woman exam.   2) Lichen sclerosis  3) Screening for breast cancer  4) Screening for cervical cancer     Plan     1) Breast Health - Clinical breast exam & mammogram yearly, Self breast awareness monthly  2) Pap - and HPV collected  3) Lichen sclerosis- Start clobetasol cream BID x 2 weeks. If no relief rec biopsy of the area.   4) STD- Declines STD screen  5) Smoking status- non smoker   6) Colon health - screening colonoscopy recommended if not up to date  7) Bone  health - Weight bearing exercise, dietary calcium recommendations and vitamin D  reviewed.   8) Follow up prn and one year    JAMAAL Klein  10/17/2017  5:14 PM

## 2017-10-19 LAB
CONV COMMENT: NORMAL
CYTOLOGIST CVX/VAG CYTO: NORMAL
CYTOLOGY CVX/VAG DOC THIN PREP: NORMAL
DX ICD CODE: NORMAL
HIV 1 & 2 AB SER-IMP: NORMAL
HPV I/H RISK 1 DNA CVX QL PROBE+SIG AMP: NEGATIVE
M GENITALIUM DNA SPEC QL NAA+PROBE: NEGATIVE
M HOMINIS DNA SPEC QL NAA+PROBE: NEGATIVE
OTHER STN SPEC: NORMAL
PATH REPORT.FINAL DX SPEC: NORMAL
STAT OF ADQ CVX/VAG CYTO-IMP: NORMAL
UREAPLASMA DNA SPEC QL NAA+PROBE: NEGATIVE

## 2017-11-20 ENCOUNTER — LAB (OUTPATIENT)
Dept: INTERNAL MEDICINE | Facility: CLINIC | Age: 52
End: 2017-11-20

## 2017-11-20 DIAGNOSIS — E78.2 MIXED HYPERLIPIDEMIA: ICD-10-CM

## 2017-11-20 DIAGNOSIS — I10 HTN (HYPERTENSION), BENIGN: ICD-10-CM

## 2017-11-20 DIAGNOSIS — I10 HTN (HYPERTENSION), BENIGN: Primary | ICD-10-CM

## 2017-11-20 PROBLEM — Z86.718 HX OF BLOOD CLOTS: Status: ACTIVE | Noted: 2017-11-20

## 2017-11-20 PROBLEM — R63.5 ABNORMAL WEIGHT GAIN: Status: ACTIVE | Noted: 2017-11-20

## 2017-11-20 PROBLEM — F41.8 DEPRESSION WITH ANXIETY: Status: ACTIVE | Noted: 2017-11-20

## 2017-11-20 LAB
ALBUMIN SERPL-MCNC: 4.3 G/DL (ref 3.5–5.2)
ALBUMIN/GLOB SERPL: 2 G/DL
ALP SERPL-CCNC: 64 U/L (ref 40–129)
ALT SERPL-CCNC: 13 U/L (ref 5–33)
AST SERPL-CCNC: 17 U/L (ref 5–32)
BASOPHILS # BLD AUTO: 0.05 10*3/MM3 (ref 0–0.2)
BASOPHILS NFR BLD AUTO: 1 % (ref 0–2)
BILIRUB SERPL-MCNC: 0.6 MG/DL (ref 0.2–1.2)
BUN SERPL-MCNC: 16 MG/DL (ref 6–20)
BUN/CREAT SERPL: 19 (ref 7–25)
CALCIUM SERPL-MCNC: 9.4 MG/DL (ref 8.6–10.5)
CHLORIDE SERPL-SCNC: 99 MMOL/L (ref 98–107)
CHOLEST SERPL-MCNC: 208 MG/DL (ref 0–200)
CO2 SERPL-SCNC: 31.8 MMOL/L (ref 22–29)
CREAT SERPL-MCNC: 0.84 MG/DL (ref 0.57–1)
EOSINOPHIL # BLD AUTO: 0.11 10*3/MM3 (ref 0.1–0.3)
EOSINOPHIL NFR BLD AUTO: 2.1 % (ref 0–4)
ERYTHROCYTE [DISTWIDTH] IN BLOOD BY AUTOMATED COUNT: 12.9 % (ref 11.5–14.5)
GFR SERPLBLD CREATININE-BSD FMLA CKD-EPI: 71 ML/MIN/1.73
GFR SERPLBLD CREATININE-BSD FMLA CKD-EPI: 86 ML/MIN/1.73
GLOBULIN SER CALC-MCNC: 2.2 GM/DL
GLUCOSE SERPL-MCNC: 88 MG/DL (ref 65–99)
HBA1C MFR BLD: 5.2 % (ref 4.8–5.6)
HCT VFR BLD AUTO: 40.8 % (ref 37–47)
HDLC SERPL-MCNC: 58 MG/DL (ref 40–60)
HGB BLD-MCNC: 13.5 G/DL (ref 12–16)
IMM GRANULOCYTES # BLD: 0.01 10*3/MM3 (ref 0–0.03)
IMM GRANULOCYTES NFR BLD: 0.2 % (ref 0–0.5)
LDLC SERPL CALC-MCNC: 127 MG/DL (ref 0–100)
LDLC/HDLC SERPL: 2.19 {RATIO}
LYMPHOCYTES # BLD AUTO: 1.76 10*3/MM3 (ref 0.6–4.8)
LYMPHOCYTES NFR BLD AUTO: 33.7 % (ref 20–45)
MCH RBC QN AUTO: 30.3 PG (ref 27–31)
MCHC RBC AUTO-ENTMCNC: 33.1 G/DL (ref 31–37)
MCV RBC AUTO: 91.5 FL (ref 81–99)
MONOCYTES # BLD AUTO: 0.38 10*3/MM3 (ref 0–1)
MONOCYTES NFR BLD AUTO: 7.3 % (ref 3–8)
NEUTROPHILS # BLD AUTO: 2.91 10*3/MM3 (ref 1.5–8.3)
NEUTROPHILS NFR BLD AUTO: 55.7 % (ref 45–70)
NRBC BLD AUTO-RTO: 0 /100 WBC (ref 0–0)
PLATELET # BLD AUTO: 277 10*3/MM3 (ref 140–500)
POTASSIUM SERPL-SCNC: 4.1 MMOL/L (ref 3.5–5.2)
PROT SERPL-MCNC: 6.5 G/DL (ref 6–8.5)
RBC # BLD AUTO: 4.46 10*6/MM3 (ref 4.2–5.4)
SODIUM SERPL-SCNC: 140 MMOL/L (ref 136–145)
TRIGL SERPL-MCNC: 116 MG/DL (ref 0–150)
VLDLC SERPL CALC-MCNC: 23.2 MG/DL (ref 7–27)
WBC # BLD AUTO: 5.22 10*3/MM3 (ref 4.8–10.8)

## 2017-11-22 ENCOUNTER — TELEPHONE (OUTPATIENT)
Dept: INTERNAL MEDICINE | Facility: CLINIC | Age: 52
End: 2017-11-22

## 2017-11-22 NOTE — TELEPHONE ENCOUNTER
----- Message from Meryl Mesa MD sent at 11/22/2017 11:50 AM EST -----  Cholesterol improved.  Other labs ok. Will discuss at upcoming appt.

## 2017-12-04 ENCOUNTER — OFFICE VISIT (OUTPATIENT)
Dept: INTERNAL MEDICINE | Facility: CLINIC | Age: 52
End: 2017-12-04

## 2017-12-04 VITALS
DIASTOLIC BLOOD PRESSURE: 82 MMHG | WEIGHT: 170.3 LBS | HEIGHT: 64 IN | HEART RATE: 88 BPM | OXYGEN SATURATION: 94 % | BODY MASS INDEX: 29.08 KG/M2 | SYSTOLIC BLOOD PRESSURE: 122 MMHG

## 2017-12-04 DIAGNOSIS — Z00.00 HEALTHCARE MAINTENANCE: Primary | ICD-10-CM

## 2017-12-04 DIAGNOSIS — K21.9 GASTROESOPHAGEAL REFLUX DISEASE, ESOPHAGITIS PRESENCE NOT SPECIFIED: ICD-10-CM

## 2017-12-04 DIAGNOSIS — E78.2 MIXED HYPERLIPIDEMIA: ICD-10-CM

## 2017-12-04 DIAGNOSIS — M72.2 PLANTAR FASCIITIS: ICD-10-CM

## 2017-12-04 DIAGNOSIS — I10 HTN (HYPERTENSION), BENIGN: ICD-10-CM

## 2017-12-04 PROCEDURE — 90471 IMMUNIZATION ADMIN: CPT | Performed by: INTERNAL MEDICINE

## 2017-12-04 PROCEDURE — 90715 TDAP VACCINE 7 YRS/> IM: CPT | Performed by: INTERNAL MEDICINE

## 2017-12-04 PROCEDURE — 99396 PREV VISIT EST AGE 40-64: CPT | Performed by: INTERNAL MEDICINE

## 2017-12-04 PROCEDURE — 99213 OFFICE O/P EST LOW 20 MIN: CPT | Performed by: INTERNAL MEDICINE

## 2017-12-04 RX ORDER — OMEPRAZOLE 20 MG/1
20 CAPSULE, DELAYED RELEASE ORAL DAILY
Qty: 90 CAPSULE | Refills: 3 | Status: SHIPPED | OUTPATIENT
Start: 2017-12-04 | End: 2018-04-22

## 2017-12-04 RX ORDER — MELOXICAM 15 MG/1
15 TABLET ORAL DAILY
Qty: 90 TABLET | Refills: 0 | Status: SHIPPED | OUTPATIENT
Start: 2017-12-04 | End: 2018-04-22

## 2017-12-04 NOTE — PROGRESS NOTES
Subjective     Neville Rodriges is a 52 y.o. female, who presents with a chief complaint of   Chief Complaint   Patient presents with   • Annual Exam       HPI   Pt here for her physical.  She has been doing well. She is trying to eat a healthy diet.  She tries to stay active and exercise.    Pt had pap recently with [facility] that was normal    UTD at eye doctor and dentist    htn - well controlled.  No ha/dizziness.  Pt was taking coreg but it made her tired.  No tachycardia or issues off med.      Dyslipidemia - pt trying to eat a healthy diet.     Gerd - pt tried zantac but it didn't work.  She is back on Prilosec    She has had plantar fasciitis and is doing PT for this which is helping.  She needs a referral for insurance.    Colonoscopy due for repeat next year.     The following portions of the patient's history were reviewed and updated as appropriate: allergies, current medications, past family history, past medical history, past social history, past surgical history and problem list.    Allergies: Ceftin [cefuroxime axetil]; Paroxetine; Ciprofloxacin; and Metronidazole    Review of Systems   Constitutional: Negative.    HENT: Negative.    Eyes: Negative.    Respiratory: Negative.    Cardiovascular: Negative.    Gastrointestinal: Negative.    Endocrine: Negative.    Genitourinary: Negative.    Musculoskeletal: Negative.    Skin: Negative.    Allergic/Immunologic: Negative.    Neurological: Negative.    Hematological: Negative.    Psychiatric/Behavioral: Negative.    All other systems reviewed and are negative.      Objective     Wt Readings from Last 3 Encounters:   12/04/17 170 lb 4.8 oz (77.2 kg)   10/17/17 168 lb 8 oz (76.4 kg)   03/20/17 168 lb (76.2 kg)     Temp Readings from Last 3 Encounters:   03/22/16 98.4 °F (36.9 °C)   02/23/16 98.3 °F (36.8 °C)   12/02/15 98.2 °F (36.8 °C) (Oral)     BP Readings from Last 3 Encounters:   12/04/17 122/82   10/17/17 130/88   03/20/17 135/84     Pulse Readings from  Last 3 Encounters:   12/04/17 88   03/20/17 81   02/28/17 70     Body mass index is 29.23 kg/(m^2).  SpO2 Readings from Last 3 Encounters:   12/04/17 94%   02/28/17 98%   01/09/17 97%       Physical Exam   Constitutional: She is oriented to person, place, and time. She appears well-developed and well-nourished. No distress.   HENT:   Head: Normocephalic and atraumatic.   Right Ear: External ear normal.   Left Ear: External ear normal.   Nose: Nose normal.   Mouth/Throat: Oropharynx is clear and moist.   Eyes: Conjunctivae and EOM are normal. Pupils are equal, round, and reactive to light.   Neck: Normal range of motion. Neck supple.   Cardiovascular: Normal rate, regular rhythm, normal heart sounds and intact distal pulses.    Pulmonary/Chest: Effort normal and breath sounds normal. No respiratory distress. She has no wheezes.   Musculoskeletal: Normal range of motion.   Normal gait   Neurological: She is alert and oriented to person, place, and time.   Skin: Skin is warm and dry.   Psychiatric: She has a normal mood and affect. Her behavior is normal. Judgment and thought content normal.   Nursing note and vitals reviewed.      Results for orders placed or performed in visit on 11/20/17   Comprehensive metabolic panel   Result Value Ref Range    Glucose 88 65 - 99 mg/dL    BUN 16 6 - 20 mg/dL    Creatinine 0.84 0.57 - 1.00 mg/dL    eGFR Non African Am 71 >60 mL/min/1.73    eGFR African Am 86 >60 mL/min/1.73    BUN/Creatinine Ratio 19.0 7.0 - 25.0    Sodium 140 136 - 145 mmol/L    Potassium 4.1 3.5 - 5.2 mmol/L    Chloride 99 98 - 107 mmol/L    Total CO2 31.8 (H) 22.0 - 29.0 mmol/L    Calcium 9.4 8.6 - 10.5 mg/dL    Total Protein 6.5 6.0 - 8.5 g/dL    Albumin 4.30 3.50 - 5.20 g/dL    Globulin 2.2 gm/dL    A/G Ratio 2.0 g/dL    Total Bilirubin 0.6 0.2 - 1.2 mg/dL    Alkaline Phosphatase 64 40 - 129 U/L    AST (SGOT) 17 5 - 32 U/L    ALT (SGPT) 13 5 - 33 U/L   Lipid Panel With LDL/HDL Ratio   Result Value Ref Range     Total Cholesterol 208 (H) 0 - 200 mg/dL    Triglycerides 116 0 - 150 mg/dL    HDL Cholesterol 58 40 - 60 mg/dL    VLDL Cholesterol 23.2 7 - 27 mg/dL    LDL Cholesterol  127 (H) 0 - 100 mg/dL    LDL/HDL Ratio 2.19    Hemoglobin A1c   Result Value Ref Range    Hemoglobin A1C 5.20 4.80 - 5.60 %   CBC w AUTO Differential   Result Value Ref Range    WBC 5.22 4.80 - 10.80 10*3/mm3    RBC 4.46 4.20 - 5.40 10*6/mm3    Hemoglobin 13.5 12.0 - 16.0 g/dL    Hematocrit 40.8 37.0 - 47.0 %    MCV 91.5 81.0 - 99.0 fL    MCH 30.3 27.0 - 31.0 pg    MCHC 33.1 31.0 - 37.0 g/dL    RDW 12.9 11.5 - 14.5 %    Platelets 277 140 - 500 10*3/mm3    Neutrophil Rel % 55.7 45.0 - 70.0 %    Lymphocyte Rel % 33.7 20.0 - 45.0 %    Monocyte Rel % 7.3 3.0 - 8.0 %    Eosinophil Rel % 2.1 0.0 - 4.0 %    Basophil Rel % 1.0 0.0 - 2.0 %    Neutrophils Absolute 2.91 1.50 - 8.30 10*3/mm3    Lymphocytes Absolute 1.76 0.60 - 4.80 10*3/mm3    Monocytes Absolute 0.38 0.00 - 1.00 10*3/mm3    Eosinophils Absolute 0.11 0.10 - 0.30 10*3/mm3    Basophils Absolute 0.05 0.00 - 0.20 10*3/mm3    Immature Granulocyte Rel % 0.2 0.0 - 0.5 %    Immature Grans Absolute 0.01 0.00 - 0.03 10*3/mm3    nRBC 0.0 0.0 - 0.0 /100 WBC       Assessment/Plan   Neville was seen today for annual exam.    Diagnoses and all orders for this visit:    Healthcare maintenance  -     Tdap Vaccine Greater Than or Equal To 8yo IM    HTN (hypertension), benign    Mixed hyperlipidemia    Gastroesophageal reflux disease, esophagitis presence not specified    Plantar fasciitis  -     Ambulatory Referral to Physical Therapy Evaluate and treat    Other orders  -     omeprazole (priLOSEC) 20 MG capsule; Take 1 capsule by mouth Daily.  -     meloxicam (MOBIC) 15 MG tablet; Take 1 tablet by mouth Daily.          Outpatient Medications Prior to Visit   Medication Sig Dispense Refill   • buPROPion XL (WELLBUTRIN XL) 150 MG 24 hr tablet Take 1 tablet by mouth daily.     • losartan-hydrochlorothiazide (HYZAAR)  100-12.5 MG per tablet TAKE 1 TABLET DAILY 90 tablet 3   • venlafaxine XR (EFFEXOR-XR) 75 MG 24 hr capsule Take 1 capsule by mouth daily.     • omeprazole (priLOSEC) 20 MG capsule Take 20 mg by mouth Daily.     • albuterol (PROVENTIL HFA;VENTOLIN HFA) 108 (90 BASE) MCG/ACT inhaler Ventolin  (90 Base) MCG/ACT Inhalation Aerosol Solution; Patient Sig: Ventolin  (90 Base) MCG/ACT Inhalation Aerosol Solution INHALE 2-4 PUFFS Q 4 HOURS PRN/COUGH/WHEEZE; 1; 0; 21-Aug-2015; Active     • ALPRAZolam XR (XANAX XR) 0.5 MG 24 hr tablet Take 1 tablet by mouth daily. 30 tablet 0   • azelastine (ASTEPRO) 0.15 % solution nasal spray 2 sprays into each nostril 2 (Two) Times a Day. 30 mL 1   • Diclofenac Sodium (PENNSAID) 2 % solution Place 1 application on the skin 2 (Two) Times a Day As Needed (pain). 112 g 3   • carvedilol (COREG) 6.25 MG tablet Take 6.25 mg by mouth. PT REPORTS TAKING ONE HALF TABLET DAILY       No facility-administered medications prior to visit.      New Medications Ordered This Visit   Medications   • omeprazole (priLOSEC) 20 MG capsule     Sig: Take 1 capsule by mouth Daily.     Dispense:  90 capsule     Refill:  3   • meloxicam (MOBIC) 15 MG tablet     Sig: Take 1 tablet by mouth Daily.     Dispense:  90 tablet     Refill:  0     [unfilled]  Medications Discontinued During This Encounter   Medication Reason   • omeprazole (priLOSEC) 20 MG capsule Reorder   • carvedilol (COREG) 6.25 MG tablet          Return in about 1 year (around 12/4/2018) for Recheck, Annual physical, labs.

## 2018-01-05 ENCOUNTER — DOCUMENTATION (OUTPATIENT)
Dept: INFUSION THERAPY | Facility: HOSPITAL | Age: 53
End: 2018-01-05

## 2018-01-05 RX ORDER — NITROFURANTOIN 25; 75 MG/1; MG/1
100 CAPSULE ORAL EVERY 12 HOURS SCHEDULED
Qty: 14 CAPSULE | Refills: 0 | Status: SHIPPED | OUTPATIENT
Start: 2018-01-05 | End: 2018-01-12

## 2018-03-30 RX ORDER — LOSARTAN POTASSIUM AND HYDROCHLOROTHIAZIDE 12.5; 1 MG/1; MG/1
TABLET ORAL
Qty: 90 TABLET | Refills: 3 | Status: SHIPPED | OUTPATIENT
Start: 2018-03-30 | End: 2018-08-20 | Stop reason: HOSPADM

## 2018-04-21 ENCOUNTER — HOSPITAL ENCOUNTER (EMERGENCY)
Facility: HOSPITAL | Age: 53
Discharge: HOME | End: 2018-04-22
Attending: EMERGENCY MEDICINE | Admitting: EMERGENCY MEDICINE

## 2018-04-21 ENCOUNTER — APPOINTMENT (OUTPATIENT)
Dept: CT IMAGING | Facility: HOSPITAL | Age: 53
End: 2018-04-21

## 2018-04-21 DIAGNOSIS — R10.9 ABDOMINAL PAIN OF UNKNOWN ETIOLOGY: Primary | ICD-10-CM

## 2018-04-21 LAB
ALBUMIN SERPL-MCNC: 4.4 G/DL (ref 3.5–5.2)
ALBUMIN/GLOB SERPL: 1.5 G/DL
ALP SERPL-CCNC: 67 U/L (ref 40–129)
ALT SERPL W P-5'-P-CCNC: 12 U/L (ref 5–33)
ANION GAP SERPL CALCULATED.3IONS-SCNC: 13.4 MMOL/L
AST SERPL-CCNC: 15 U/L (ref 5–32)
BASOPHILS # BLD AUTO: 0.06 10*3/MM3 (ref 0–0.2)
BASOPHILS NFR BLD AUTO: 0.9 % (ref 0–2)
BILIRUB SERPL-MCNC: 0.2 MG/DL (ref 0.2–1.2)
BILIRUB UR QL STRIP: ABNORMAL
BUN BLD-MCNC: 11 MG/DL (ref 6–20)
BUN/CREAT SERPL: 14.3 (ref 7–25)
CALCIUM SPEC-SCNC: 9.4 MG/DL (ref 8.6–10.5)
CHLORIDE SERPL-SCNC: 99 MMOL/L (ref 98–107)
CLARITY UR: CLEAR
CO2 SERPL-SCNC: 27.6 MMOL/L (ref 22–29)
COLOR UR: YELLOW
CREAT BLD-MCNC: 0.77 MG/DL (ref 0.57–1)
DEPRECATED RDW RBC AUTO: 42.6 FL (ref 37–54)
EOSINOPHIL # BLD AUTO: 0.13 10*3/MM3 (ref 0.1–0.3)
EOSINOPHIL NFR BLD AUTO: 2 % (ref 0–4)
ERYTHROCYTE [DISTWIDTH] IN BLOOD BY AUTOMATED COUNT: 13.2 % (ref 11.5–14.5)
GFR SERPL CREATININE-BSD FRML MDRD: 78 ML/MIN/1.73
GLOBULIN UR ELPH-MCNC: 3 GM/DL
GLUCOSE BLD-MCNC: 96 MG/DL (ref 65–99)
GLUCOSE UR STRIP-MCNC: NEGATIVE MG/DL
HCT VFR BLD AUTO: 40.5 % (ref 37–47)
HGB BLD-MCNC: 13.5 G/DL (ref 12–16)
HGB UR QL STRIP.AUTO: NEGATIVE
IMM GRANULOCYTES # BLD: 0.02 10*3/MM3 (ref 0–0.03)
IMM GRANULOCYTES NFR BLD: 0.3 % (ref 0–0.5)
KETONES UR QL STRIP: NEGATIVE
LEUKOCYTE ESTERASE UR QL STRIP.AUTO: NEGATIVE
LIPASE SERPL-CCNC: 47 U/L (ref 13–60)
LYMPHOCYTES # BLD AUTO: 2.39 10*3/MM3 (ref 0.6–4.8)
LYMPHOCYTES NFR BLD AUTO: 36.8 % (ref 20–45)
MCH RBC QN AUTO: 29.6 PG (ref 27–31)
MCHC RBC AUTO-ENTMCNC: 33.3 G/DL (ref 31–37)
MCV RBC AUTO: 88.8 FL (ref 81–99)
MONOCYTES # BLD AUTO: 0.52 10*3/MM3 (ref 0–1)
MONOCYTES NFR BLD AUTO: 8 % (ref 3–8)
NEUTROPHILS # BLD AUTO: 3.38 10*3/MM3 (ref 1.5–8.3)
NEUTROPHILS NFR BLD AUTO: 52 % (ref 45–70)
NITRITE UR QL STRIP: NEGATIVE
NRBC BLD MANUAL-RTO: 0 /100 WBC (ref 0–0)
PH UR STRIP.AUTO: <=5 [PH] (ref 4.5–8)
PLATELET # BLD AUTO: 249 10*3/MM3 (ref 140–500)
PMV BLD AUTO: 9.2 FL (ref 7.4–10.4)
POTASSIUM BLD-SCNC: 3.7 MMOL/L (ref 3.5–5.2)
PROT SERPL-MCNC: 7.4 G/DL (ref 6–8.5)
PROT UR QL STRIP: NEGATIVE
RBC # BLD AUTO: 4.56 10*6/MM3 (ref 4.2–5.4)
SODIUM BLD-SCNC: 140 MMOL/L (ref 136–145)
SP GR UR STRIP: 1.03 (ref 1–1.03)
UROBILINOGEN UR QL STRIP: ABNORMAL
WBC NRBC COR # BLD: 6.5 10*3/MM3 (ref 4.8–10.8)

## 2018-04-21 PROCEDURE — 85025 COMPLETE CBC W/AUTO DIFF WBC: CPT | Performed by: EMERGENCY MEDICINE

## 2018-04-21 PROCEDURE — 80053 COMPREHEN METABOLIC PANEL: CPT | Performed by: EMERGENCY MEDICINE

## 2018-04-21 PROCEDURE — 81003 URINALYSIS AUTO W/O SCOPE: CPT | Performed by: EMERGENCY MEDICINE

## 2018-04-21 PROCEDURE — 99283 EMERGENCY DEPT VISIT LOW MDM: CPT

## 2018-04-21 PROCEDURE — 99282 EMERGENCY DEPT VISIT SF MDM: CPT | Performed by: EMERGENCY MEDICINE

## 2018-04-21 PROCEDURE — 83690 ASSAY OF LIPASE: CPT | Performed by: EMERGENCY MEDICINE

## 2018-04-21 PROCEDURE — 74177 CT ABD & PELVIS W/CONTRAST: CPT

## 2018-04-21 RX ORDER — HYDROMORPHONE HCL 110MG/55ML
0.5 PATIENT CONTROLLED ANALGESIA SYRINGE INTRAVENOUS ONCE
Status: DISCONTINUED | OUTPATIENT
Start: 2018-04-21 | End: 2018-04-22 | Stop reason: HOSPADM

## 2018-04-21 RX ORDER — MAGNESIUM HYDROXIDE/ALUMINUM HYDROXICE/SIMETHICONE 120; 1200; 1200 MG/30ML; MG/30ML; MG/30ML
10 SUSPENSION ORAL ONCE
Status: COMPLETED | OUTPATIENT
Start: 2018-04-21 | End: 2018-04-21

## 2018-04-21 RX ORDER — ALUMINA, MAGNESIA, AND SIMETHICONE 2400; 2400; 240 MG/30ML; MG/30ML; MG/30ML
SUSPENSION ORAL
Status: DISCONTINUED
Start: 2018-04-21 | End: 2018-04-22 | Stop reason: HOSPADM

## 2018-04-21 RX ORDER — SODIUM CHLORIDE 0.9 % (FLUSH) 0.9 %
10 SYRINGE (ML) INJECTION AS NEEDED
Status: DISCONTINUED | OUTPATIENT
Start: 2018-04-21 | End: 2018-04-22 | Stop reason: HOSPADM

## 2018-04-21 RX ADMIN — ALUMINUM HYDROXIDE, MAGNESIUM HYDROXIDE, AND SIMETHICONE 30 ML: 200; 200; 20 SUSPENSION ORAL at 22:47

## 2018-04-22 VITALS
OXYGEN SATURATION: 99 % | RESPIRATION RATE: 16 BRPM | DIASTOLIC BLOOD PRESSURE: 97 MMHG | HEART RATE: 76 BPM | TEMPERATURE: 97.9 F | HEIGHT: 64 IN | WEIGHT: 170 LBS | BODY MASS INDEX: 29.02 KG/M2 | SYSTOLIC BLOOD PRESSURE: 149 MMHG

## 2018-04-22 PROCEDURE — 0 IOPAMIDOL PER 1 ML: Performed by: EMERGENCY MEDICINE

## 2018-04-22 RX ORDER — OMEPRAZOLE 20 MG/1
40 CAPSULE, DELAYED RELEASE ORAL DAILY
Qty: 90 CAPSULE | Refills: 0 | Status: SHIPPED | OUTPATIENT
Start: 2018-04-22 | End: 2018-05-09 | Stop reason: SDUPTHER

## 2018-04-22 RX ADMIN — IOPAMIDOL 100 ML: 755 INJECTION, SOLUTION INTRAVENOUS at 00:12

## 2018-04-22 NOTE — ED PROVIDER NOTES
Subjective   History of Present Illness  History of Present Illness    Chief complaint: Abdominal pain    Location: Epigastrium and right upper quadrant    Quality/Severity:  Moderate    Timing/Duration: Symptoms began approximately 6 months ago and have been intermittent.  Recently the patient has been experiencing 2-3 episodes per week and each episode can last several hours.    Modifying Factors: No known inciting factors.    Associated Symptoms: Nausea    Narrative: The patient is a 52-year-old white female who presents as noted above.  The patient does have a prior history of cholecystectomy and appendectomy.  The patient also had one previous episode of pancreatitis.  She relates that the episodes of pain started approximately 6 months ago and lately the pain has been more severe and more frequent.  Patient also has a history of a gastric sleeve.  Mild nausea without vomiting.  No constipation or diarrhea.    Review of Systems   Constitutional: Negative for activity change, appetite change, fatigue and fever.   HENT: Negative for congestion.    Respiratory: Negative for cough, shortness of breath and wheezing.    Cardiovascular: Negative for chest pain, palpitations and leg swelling.   Gastrointestinal: Positive for abdominal pain and nausea. Negative for diarrhea and vomiting.   Endocrine: Negative for polydipsia.   Genitourinary: Negative for difficulty urinating, dysuria, flank pain, frequency and urgency.   Musculoskeletal: Negative for back pain.   Skin: Negative for rash.   Neurological: Negative for dizziness, weakness and headaches.   Psychiatric/Behavioral: Negative for confusion.   All other systems reviewed and are negative.      Past Medical History:   Diagnosis Date   • Colon polyps     c-scope was fall 2014. due for repeat in 2-3 years.   • Depression    • Gastroesophageal reflux disease 2/28/2017   • Headache     More problems when big fluctuations in weather   • Humerus fracture    •  Hypercholesterolemia     High Triglycerides   • Hypertension    • Plantar fasciitis    • Sleep apnea        Allergies   Allergen Reactions   • Ceftin [Cefuroxime Axetil]    • Paroxetine    • Ciprofloxacin Rash   • Metronidazole Rash       Past Surgical History:   Procedure Laterality Date   • APPENDECTOMY     • CHOLECYSTECTOMY     • HYSTERECTOMY      Partial   • SINUS SURGERY     • STOMACH SURGERY  12/10/2013    Gastric sleve   • TONSILLECTOMY         Family History   Problem Relation Age of Onset   • Diabetes Mother    • Anxiety disorder Mother    • Depression Mother    • Diabetes Father    • Heart disease Father      CABG x 3 in early 60s   • Hypertension Father    • Anxiety disorder Sister    • Depression Sister    • Cancer Daughter      Synovial Cell Sarcoma       Social History     Social History   • Marital status:      Social History Main Topics   • Smoking status: Never Smoker   • Alcohol use 0.6 oz/week     1 Glasses of wine per week      Comment: 2 X PER MONTH    • Drug use: No   • Sexual activity: Yes     Partners: Male     Birth control/ protection: Post-menopausal     Other Topics Concern   • Not on file           Objective   Physical Exam   Constitutional: She is oriented to person, place, and time. She appears well-developed and well-nourished.   HENT:   Head: Normocephalic and atraumatic.   Eyes: Conjunctivae and EOM are normal.   Neck: Normal range of motion. Neck supple. No thyromegaly present.   Cardiovascular: Normal rate, regular rhythm and normal heart sounds.    No murmur heard.  Pulmonary/Chest: Effort normal and breath sounds normal. No respiratory distress. She has no wheezes. She has no rales.   Abdominal: Soft. Bowel sounds are normal. She exhibits no distension. There is tenderness (with light palpation in the epigastriumand right upper quadrant.).   Musculoskeletal: Normal range of motion. She exhibits no edema or tenderness.   Lymphadenopathy:     She has no cervical adenopathy.    Neurological: She is alert and oriented to person, place, and time.   Skin: Skin is warm and dry. No rash noted.   Psychiatric: She has a normal mood and affect. Her behavior is normal.   Nursing note and vitals reviewed.      Procedures         ED Course  ED Course   Comment By Time   Radiology wet read on the CT of the abdomen/pelvis showed no acute findings.  All findings discussed with patient at it was recommended that she increase her Prilosec and discontinue her mobic.  Follow-up with Dr. Mesa recommended. Barak Lovell MD 04/22 0050                  MDM  Number of Diagnoses or Management Options  Abdominal pain of unknown etiology: new and requires workup     Amount and/or Complexity of Data Reviewed  Clinical lab tests: ordered and reviewed  Tests in the radiology section of CPT®: ordered and reviewed  Independent visualization of images, tracings, or specimens: yes    Risk of Complications, Morbidity, and/or Mortality  Presenting problems: high  Diagnostic procedures: high  Management options: moderate      Labs this visit  Lab Results (last 24 hours)     Procedure Component Value Units Date/Time    Urinalysis With / Microscopic If Indicated - Urine, Clean Catch [[id number]]  (Abnormal) Collected:  04/21/18 2143    Specimen:  Urine from Urine, Clean Catch Updated:  04/21/18 2152     Color, UA Yellow     Appearance, UA Clear     pH, UA <=5.0     Specific [place], UA 1.030     Comment: Result obtained by Refractometer        Glucose, UA Negative     Ketones, UA Negative     Bilirubin, UA Small (1+) (A)     Blood, UA Negative     Protein, UA Negative     Leuk Esterase, UA Negative     Nitrite, UA Negative     Urobilinogen, UA 0.2 E.U./dL    Narrative:       Urine microscopic not indicated.    Comprehensive Metabolic Panel [[id number]] Collected:  04/21/18 2233    Specimen:  Blood Updated:  04/21/18 2309     Glucose 96 mg/dL      BUN 11 mg/dL      Creatinine 0.77 mg/dL      Sodium 140 mmol/L       Potassium 3.7 mmol/L      Chloride 99 mmol/L      CO2 27.6 mmol/L      Calcium 9.4 mg/dL      Total Protein 7.4 g/dL      Albumin 4.40 g/dL      ALT (SGPT) 12 U/L      AST (SGOT) 15 U/L      Alkaline Phosphatase 67 U/L      Total Bilirubin 0.2 mg/dL      eGFR Non African Amer 78 mL/min/1.73      Globulin 3.0 gm/dL      A/G Ratio 1.5 g/dL      BUN/Creatinine Ratio 14.3     Anion Gap 13.4 mmol/L     Lipase [[id number]]  (Normal) Collected:  04/21/18 2233    Specimen:  Blood Updated:  04/21/18 2309     Lipase 47 U/L     CBC & Differential [[id number]] Collected:  04/21/18 2259    Specimen:  Blood Updated:  04/21/18 2306    Narrative:       The following orders were created for panel order CBC & Differential.  Procedure                               Abnormality         Status                     ---------                               -----------         ------                     CBC Auto Differential[[id number]]        Normal              Final result                 Please view results for these tests on the individual orders.    CBC Auto Differential [[id number]]  (Normal) Collected:  04/21/18 2259    Specimen:  Blood Updated:  04/21/18 2306     WBC 6.50 10*3/mm3      RBC 4.56 10*6/mm3      Hemoglobin 13.5 g/dL      Hematocrit 40.5 %      MCV 88.8 fL      MCH 29.6 pg      MCHC 33.3 g/dL      RDW 13.2 %      RDW-SD 42.6 fl      MPV 9.2 fL      Platelets 249 10*3/mm3      Neutrophil % 52.0 %      Lymphocyte % 36.8 %      Monocyte % 8.0 %      Eosinophil % 2.0 %      Basophil % 0.9 %      Immature Grans % 0.3 %      Neutrophils, Absolute 3.38 10*3/mm3      Lymphocytes, Absolute 2.39 10*3/mm3      Monocytes, Absolute 0.52 10*3/mm3      Eosinophils, Absolute 0.13 10*3/mm3      Basophils, Absolute 0.06 10*3/mm3      Immature Grans, Absolute 0.02 10*3/mm3      nRBC 0.0 /100 WBC         Prescribed on discharge             Medication List      Changed    omeprazole 20 MG capsule  Commonly known as:  priLOSEC  Take 2 capsules by  mouth Daily.  What changed:  how much to take        Stop    Diclofenac Sodium 2 % solution  Commonly known as:  PENNSAID     meloxicam 15 MG tablet  Commonly known as:  MOBIC          All lab results, imaging results and other tests were reviewed by Barak Lovell MD and unless otherwise specified were found to be unremarkable.      Final diagnoses:   Abdominal pain of unknown etiology            Barak Lovell MD  04/22/18 0103

## 2018-04-23 ENCOUNTER — OFFICE VISIT (OUTPATIENT)
Dept: INTERNAL MEDICINE | Facility: CLINIC | Age: 53
End: 2018-04-23

## 2018-04-23 VITALS
DIASTOLIC BLOOD PRESSURE: 70 MMHG | HEART RATE: 92 BPM | BODY MASS INDEX: 28.89 KG/M2 | WEIGHT: 169.2 LBS | HEIGHT: 64 IN | OXYGEN SATURATION: 98 % | SYSTOLIC BLOOD PRESSURE: 116 MMHG

## 2018-04-23 DIAGNOSIS — B02.8 HERPES ZOSTER WITH OTHER COMPLICATION: Primary | ICD-10-CM

## 2018-04-23 DIAGNOSIS — F41.9 ANXIETY: ICD-10-CM

## 2018-04-23 DIAGNOSIS — R10.9 RIGHT FLANK PAIN: ICD-10-CM

## 2018-04-23 PROCEDURE — 99213 OFFICE O/P EST LOW 20 MIN: CPT | Performed by: INTERNAL MEDICINE

## 2018-04-23 RX ORDER — VALACYCLOVIR HYDROCHLORIDE 1 G/1
1000 TABLET, FILM COATED ORAL 3 TIMES DAILY
Qty: 21 TABLET | Refills: 0 | Status: SHIPPED | OUTPATIENT
Start: 2018-04-23 | End: 2019-10-30

## 2018-04-23 RX ORDER — ALPRAZOLAM 0.5 MG/1
0.5 TABLET, EXTENDED RELEASE ORAL DAILY
Qty: 30 TABLET | Refills: 0 | Status: SHIPPED | OUTPATIENT
Start: 2018-04-23 | End: 2018-04-23 | Stop reason: SDUPTHER

## 2018-04-23 RX ORDER — ALPRAZOLAM 0.5 MG/1
0.5 TABLET, EXTENDED RELEASE ORAL DAILY
Qty: 30 TABLET | Refills: 0 | Status: SHIPPED | OUTPATIENT
Start: 2018-04-23 | End: 2019-10-30 | Stop reason: SDUPTHER

## 2018-04-23 NOTE — PROGRESS NOTES
Neville Rodriges is a 52 y.o. female, who presents with a chief complaint of   Chief Complaint   Patient presents with   • Med Refill     Refill on Xanax.   • Abdominal Pain     WEnt to ED on Saturday night, starts at sternum and goes all the way around to her R lower back.        HPI     The pt has a numb spot about the size of silver dollar on her back x 6 mo.  Then over the past 3 days she has pain that begins under the sternum and it radiates around her right side too her R lower back.  She felt bloated almost like she was pregnane.  The pain is inside and got bad enough that she ended up in the ER over the weekend.   Ct abd/pelvis and labs were all normal.  No rash.  No recent shingles outbreak.  The pain is worse at the end of the day or with activity.  She had to leave work yesterday bc of her sx.      She takes xanax prn anxiety.  She had 30 in her last rx and that lasted her almost 2 years.  Her dad passed away early in March and things have been hard since.     The following portions of the patient's history were reviewed and updated as appropriate: allergies, current medications, past family history, past medical history, past social history, past surgical history and problem list.    Allergies: Ceftin [cefuroxime axetil]; Paroxetine; Ciprofloxacin; and Metronidazole    Review of Systems   Constitutional: Negative.    HENT: Negative.    Eyes: Negative.    Respiratory: Negative.    Cardiovascular: Negative.    Gastrointestinal: Negative.    Endocrine: Negative.    Genitourinary: Negative.    Musculoskeletal: Negative.    Skin: Negative.    Allergic/Immunologic: Negative.    Neurological: Negative.    Hematological: Negative.    Psychiatric/Behavioral: Negative.    All other systems reviewed and are negative.            Wt Readings from Last 3 Encounters:   04/23/18 76.7 kg (169 lb 3.2 oz)   04/21/18 77.1 kg (170 lb)   12/04/17 77.2 kg (170 lb 4.8 oz)     Temp Readings from Last 3 Encounters:    04/21/18 97.9 °F (36.6 °C) (Oral)   03/22/16 98.4 °F (36.9 °C)   02/23/16 98.3 °F (36.8 °C)     BP Readings from Last 3 Encounters:   04/23/18 116/70   04/22/18 149/97   12/04/17 122/82     Pulse Readings from Last 3 Encounters:   04/23/18 92   04/22/18 76   12/04/17 88     Body mass index is 29.24 kg/m².  @LASTSAO2(3)@    Physical Exam   Constitutional: She is oriented to person, place, and time. She appears well-developed and well-nourished. No distress.   HENT:   Head: Normocephalic and atraumatic.   Right Ear: External ear normal.   Left Ear: External ear normal.   Nose: Nose normal.   Mouth/Throat: Oropharynx is clear and moist.   Eyes: Conjunctivae and EOM are normal. Pupils are equal, round, and reactive to light.   Neck: Normal range of motion. Neck supple.   Cardiovascular: Normal rate, regular rhythm, normal heart sounds and intact distal pulses.    Pulmonary/Chest: Effort normal and breath sounds normal. No respiratory distress. She has no wheezes.   Musculoskeletal: Normal range of motion.   Normal gait   Neurological: She is alert and oriented to person, place, and time.   Skin: Skin is warm and dry.   Psychiatric: She has a normal mood and affect. Her behavior is normal. Judgment and thought content normal.   Nursing note and vitals reviewed.      Results for orders placed or performed during the hospital encounter of 04/21/18   Urinalysis With / Microscopic If Indicated - Urine, Clean Catch   Result Value Ref Range    Color, UA Yellow Yellow, Straw    Appearance, UA Clear Clear    pH, UA <=5.0 4.5 - 8.0    Specific Gravity, UA 1.030 1.003 - 1.030    Glucose, UA Negative Negative    Ketones, UA Negative Negative, 80 mg/dL (3+), >=160 mg/dL (4+)    Bilirubin, UA Small (1+) (A) Negative    Blood, UA Negative Negative    Protein, UA Negative Negative    Leuk Esterase, UA Negative Negative    Nitrite, UA Negative Negative    Urobilinogen, UA 0.2 E.U./dL 0.2 - 1.0 E.U./dL   Comprehensive Metabolic Panel    Result Value Ref Range    Glucose 96 65 - 99 mg/dL    BUN 11 6 - 20 mg/dL    Creatinine 0.77 0.57 - 1.00 mg/dL    Sodium 140 136 - 145 mmol/L    Potassium 3.7 3.5 - 5.2 mmol/L    Chloride 99 98 - 107 mmol/L    CO2 27.6 22.0 - 29.0 mmol/L    Calcium 9.4 8.6 - 10.5 mg/dL    Total Protein 7.4 6.0 - 8.5 g/dL    Albumin 4.40 3.50 - 5.20 g/dL    ALT (SGPT) 12 5 - 33 U/L    AST (SGOT) 15 5 - 32 U/L    Alkaline Phosphatase 67 40 - 129 U/L    Total Bilirubin 0.2 0.2 - 1.2 mg/dL    eGFR Non African Amer 78 >60 mL/min/1.73    Globulin 3.0 gm/dL    A/G Ratio 1.5 g/dL    BUN/Creatinine Ratio 14.3 7.0 - 25.0    Anion Gap 13.4 mmol/L   Lipase   Result Value Ref Range    Lipase 47 13 - 60 U/L   CBC Auto Differential   Result Value Ref Range    WBC 6.50 4.80 - 10.80 10*3/mm3    RBC 4.56 4.20 - 5.40 10*6/mm3    Hemoglobin 13.5 12.0 - 16.0 g/dL    Hematocrit 40.5 37.0 - 47.0 %    MCV 88.8 81.0 - 99.0 fL    MCH 29.6 27.0 - 31.0 pg    MCHC 33.3 31.0 - 37.0 g/dL    RDW 13.2 11.5 - 14.5 %    RDW-SD 42.6 37.0 - 54.0 fl    MPV 9.2 7.4 - 10.4 fL    Platelets 249 140 - 500 10*3/mm3    Neutrophil % 52.0 45.0 - 70.0 %    Lymphocyte % 36.8 20.0 - 45.0 %    Monocyte % 8.0 3.0 - 8.0 %    Eosinophil % 2.0 0.0 - 4.0 %    Basophil % 0.9 0.0 - 2.0 %    Immature Grans % 0.3 0.0 - 0.5 %    Neutrophils, Absolute 3.38 1.50 - 8.30 10*3/mm3    Lymphocytes, Absolute 2.39 0.60 - 4.80 10*3/mm3    Monocytes, Absolute 0.52 0.00 - 1.00 10*3/mm3    Eosinophils, Absolute 0.13 0.10 - 0.30 10*3/mm3    Basophils, Absolute 0.06 0.00 - 0.20 10*3/mm3    Immature Grans, Absolute 0.02 0.00 - 0.03 10*3/mm3    nRBC 0.0 0.0 - 0.0 /100 WBC           Neville was seen today for med refill and abdominal pain.    Diagnoses and all orders for this visit:    Herpes zoster with other complication  -     valACYclovir (VALTREX) 1000 MG tablet; Take 1 tablet by mouth 3 (Three) Times a Day.    Right flank pain    Anxiety  -     ALPRAZolam XR (XANAX XR) 0.5 MG 24 hr tablet; Take 1  tablet by mouth Daily.      Concern for atypical shingles presentation.  Ct and labs all normal on 4/21.    Outpatient Medications Prior to Visit   Medication Sig Dispense Refill   • albuterol (PROVENTIL HFA;VENTOLIN HFA) 108 (90 BASE) MCG/ACT inhaler Ventolin  (90 Base) MCG/ACT Inhalation Aerosol Solution; Patient Sig: Ventolin  (90 Base) MCG/ACT Inhalation Aerosol Solution INHALE 2-4 PUFFS Q 4 HOURS PRN/COUGH/WHEEZE; 1; 0; 21-Aug-2015; Active     • azelastine (ASTEPRO) 0.15 % solution nasal spray 2 sprays into each nostril 2 (Two) Times a Day. 30 mL 1   • buPROPion XL (WELLBUTRIN XL) 150 MG 24 hr tablet Take 1 tablet by mouth daily.     • losartan-hydrochlorothiazide (HYZAAR) 100-12.5 MG per tablet TAKE 1 TABLET DAILY 90 tablet 3   • omeprazole (priLOSEC) 20 MG capsule Take 2 capsules by mouth Daily. 90 capsule 0   • venlafaxine XR (EFFEXOR-XR) 75 MG 24 hr capsule Take 1 capsule by mouth daily.     • ALPRAZolam XR (XANAX XR) 0.5 MG 24 hr tablet Take 1 tablet by mouth daily. 30 tablet 0     No facility-administered medications prior to visit.      New Medications Ordered This Visit   Medications   • valACYclovir (VALTREX) 1000 MG tablet     Sig: Take 1 tablet by mouth 3 (Three) Times a Day.     Dispense:  21 tablet     Refill:  0   • ALPRAZolam XR (XANAX XR) 0.5 MG 24 hr tablet     Sig: Take 1 tablet by mouth Daily.     Dispense:  30 tablet     Refill:  0     [unfilled]  Medications Discontinued During This Encounter   Medication Reason   • ALPRAZolam XR (XANAX XR) 0.5 MG 24 hr tablet Reorder   • ALPRAZolam XR (XANAX XR) 0.5 MG 24 hr tablet Reorder         Return in about 1 week (around 4/30/2018) for Recheck.

## 2018-05-09 ENCOUNTER — OFFICE VISIT (OUTPATIENT)
Dept: INTERNAL MEDICINE | Facility: CLINIC | Age: 53
End: 2018-05-09

## 2018-05-09 VITALS
WEIGHT: 170 LBS | SYSTOLIC BLOOD PRESSURE: 112 MMHG | DIASTOLIC BLOOD PRESSURE: 78 MMHG | OXYGEN SATURATION: 98 % | BODY MASS INDEX: 29.02 KG/M2 | HEIGHT: 64 IN | HEART RATE: 88 BPM

## 2018-05-09 DIAGNOSIS — R10.13 EPIGASTRIC PAIN: Primary | ICD-10-CM

## 2018-05-09 LAB
ALBUMIN SERPL-MCNC: 4.3 G/DL (ref 3.5–5.2)
ALBUMIN/GLOB SERPL: 2.2 G/DL
ALP SERPL-CCNC: 66 U/L (ref 40–129)
ALT SERPL-CCNC: 11 U/L (ref 5–33)
AMYLASE SERPL-CCNC: 93 U/L (ref 28–100)
AST SERPL-CCNC: 13 U/L (ref 5–32)
BASOPHILS # BLD AUTO: 0.07 10*3/MM3 (ref 0–0.2)
BASOPHILS NFR BLD AUTO: 1 % (ref 0–2)
BILIRUB SERPL-MCNC: 0.3 MG/DL (ref 0.2–1.2)
BUN SERPL-MCNC: 14 MG/DL (ref 6–20)
BUN/CREAT SERPL: 17.1 (ref 7–25)
CALCIUM SERPL-MCNC: 9.6 MG/DL (ref 8.6–10.5)
CHLORIDE SERPL-SCNC: 103 MMOL/L (ref 98–107)
CO2 SERPL-SCNC: 30.1 MMOL/L (ref 22–29)
CREAT SERPL-MCNC: 0.82 MG/DL (ref 0.57–1)
EOSINOPHIL # BLD AUTO: 0.12 10*3/MM3 (ref 0.1–0.3)
EOSINOPHIL NFR BLD AUTO: 1.8 % (ref 0–4)
ERYTHROCYTE [DISTWIDTH] IN BLOOD BY AUTOMATED COUNT: 13.4 % (ref 11.5–14.5)
GFR SERPLBLD CREATININE-BSD FMLA CKD-EPI: 73 ML/MIN/1.73
GFR SERPLBLD CREATININE-BSD FMLA CKD-EPI: 88 ML/MIN/1.73
GLOBULIN SER CALC-MCNC: 2 GM/DL
GLUCOSE SERPL-MCNC: 87 MG/DL (ref 65–99)
HCT VFR BLD AUTO: 41.9 % (ref 37–47)
HGB BLD-MCNC: 13.6 G/DL (ref 12–16)
IMM GRANULOCYTES # BLD: 0.05 10*3/MM3 (ref 0–0.03)
IMM GRANULOCYTES NFR BLD: 0.7 % (ref 0–0.5)
LIPASE SERPL-CCNC: 49 U/L (ref 13–60)
LYMPHOCYTES # BLD AUTO: 1.77 10*3/MM3 (ref 0.6–4.8)
LYMPHOCYTES NFR BLD AUTO: 26.2 % (ref 20–45)
MCH RBC QN AUTO: 29.4 PG (ref 27–31)
MCHC RBC AUTO-ENTMCNC: 32.5 G/DL (ref 31–37)
MCV RBC AUTO: 90.7 FL (ref 81–99)
MONOCYTES # BLD AUTO: 0.47 10*3/MM3 (ref 0–1)
MONOCYTES NFR BLD AUTO: 7 % (ref 3–8)
NEUTROPHILS # BLD AUTO: 4.28 10*3/MM3 (ref 1.5–8.3)
NEUTROPHILS NFR BLD AUTO: 63.3 % (ref 45–70)
NRBC BLD AUTO-RTO: 0 /100 WBC (ref 0–0)
PLATELET # BLD AUTO: 295 10*3/MM3 (ref 140–500)
POTASSIUM SERPL-SCNC: 4.4 MMOL/L (ref 3.5–5.2)
PROT SERPL-MCNC: 6.3 G/DL (ref 6–8.5)
RBC # BLD AUTO: 4.62 10*6/MM3 (ref 4.2–5.4)
SODIUM SERPL-SCNC: 143 MMOL/L (ref 136–145)
WBC # BLD AUTO: 6.76 10*3/MM3 (ref 4.8–10.8)

## 2018-05-09 PROCEDURE — 99213 OFFICE O/P EST LOW 20 MIN: CPT | Performed by: INTERNAL MEDICINE

## 2018-05-09 RX ORDER — OMEPRAZOLE 40 MG/1
40 CAPSULE, DELAYED RELEASE ORAL 2 TIMES DAILY
Qty: 60 CAPSULE | Refills: 0 | Status: SHIPPED | OUTPATIENT
Start: 2018-05-09 | End: 2018-08-20 | Stop reason: HOSPADM

## 2018-05-09 NOTE — PROGRESS NOTES
Neville Rodriges is a 52 y.o. female, who presents with a chief complaint of   Chief Complaint   Patient presents with   • Follow-up   • Herpes Zoster       HPI   The pt's abdominal pain has persisted.  Work up in the ER 4/21 was all neg.  Concern for poss shingles without rash.  Pt took valtrex but no change in sx.  She eats a healthy diet.  S/p gastric sleeve.  S/p cholecystectomy.  The omeprazole has helped some.  abd pain is worst in the RUQ. No n/v/d/c.  Pt just has pain all the way from RUQ through to her back.  UA has been neg.  No hx stones.  The pain is at a dull roar all the time and tends to be worse at night.  She has an appt with GI on Tuesday.  Hx h. Pylori about 4-5 years ago.      The following portions of the patient's history were reviewed and updated as appropriate: allergies, current medications, past family history, past medical history, past social history, past surgical history and problem list.    Allergies: Ceftin [cefuroxime axetil]; Paroxetine; Ciprofloxacin; and Metronidazole    Review of Systems   Constitutional: Negative.    HENT: Negative.    Eyes: Negative.    Respiratory: Negative.    Cardiovascular: Negative.    Gastrointestinal: Positive for abdominal pain.   Endocrine: Negative.    Genitourinary: Negative.    Musculoskeletal: Negative.    Skin: Negative.    Allergic/Immunologic: Negative.    Neurological: Negative.    Hematological: Negative.    Psychiatric/Behavioral: Negative.    All other systems reviewed and are negative.            Wt Readings from Last 3 Encounters:   05/09/18 77.1 kg (170 lb)   04/23/18 76.7 kg (169 lb 3.2 oz)   04/21/18 77.1 kg (170 lb)     Temp Readings from Last 3 Encounters:   04/21/18 97.9 °F (36.6 °C) (Oral)   03/22/16 98.4 °F (36.9 °C)   02/23/16 98.3 °F (36.8 °C)     BP Readings from Last 3 Encounters:   05/09/18 112/78   04/23/18 116/70   04/22/18 149/97     Pulse Readings from Last 3 Encounters:   05/09/18 88   04/23/18 92   04/22/18 76      Body mass index is 29.38 kg/m².  @LASTSAO2(3)@    Physical Exam   Constitutional: She is oriented to person, place, and time. She appears well-developed and well-nourished. No distress.   HENT:   Head: Normocephalic and atraumatic.   Right Ear: External ear normal.   Left Ear: External ear normal.   Nose: Nose normal.   Mouth/Throat: Oropharynx is clear and moist.   Eyes: Conjunctivae and EOM are normal. Pupils are equal, round, and reactive to light.   Neck: Normal range of motion. Neck supple.   Cardiovascular: Normal rate, regular rhythm, normal heart sounds and intact distal pulses.    Pulmonary/Chest: Effort normal and breath sounds normal. No respiratory distress. She has no wheezes.   Musculoskeletal: Normal range of motion.   Normal gait   Neurological: She is alert and oriented to person, place, and time.   Skin: Skin is warm and dry.   Psychiatric: She has a normal mood and affect. Her behavior is normal. Judgment and thought content normal.   Nursing note and vitals reviewed.      Results for orders placed or performed during the hospital encounter of 04/21/18   Urinalysis With / Microscopic If Indicated - Urine, Clean Catch   Result Value Ref Range    Color, UA Yellow Yellow, Straw    Appearance, UA Clear Clear    pH, UA <=5.0 4.5 - 8.0    Specific Gravity, UA 1.030 1.003 - 1.030    Glucose, UA Negative Negative    Ketones, UA Negative Negative, 80 mg/dL (3+), >=160 mg/dL (4+)    Bilirubin, UA Small (1+) (A) Negative    Blood, UA Negative Negative    Protein, UA Negative Negative    Leuk Esterase, UA Negative Negative    Nitrite, UA Negative Negative    Urobilinogen, UA 0.2 E.U./dL 0.2 - 1.0 E.U./dL   Comprehensive Metabolic Panel   Result Value Ref Range    Glucose 96 65 - 99 mg/dL    BUN 11 6 - 20 mg/dL    Creatinine 0.77 0.57 - 1.00 mg/dL    Sodium 140 136 - 145 mmol/L    Potassium 3.7 3.5 - 5.2 mmol/L    Chloride 99 98 - 107 mmol/L    CO2 27.6 22.0 - 29.0 mmol/L    Calcium 9.4 8.6 - 10.5 mg/dL     Total Protein 7.4 6.0 - 8.5 g/dL    Albumin 4.40 3.50 - 5.20 g/dL    ALT (SGPT) 12 5 - 33 U/L    AST (SGOT) 15 5 - 32 U/L    Alkaline Phosphatase 67 40 - 129 U/L    Total Bilirubin 0.2 0.2 - 1.2 mg/dL    eGFR Non African Amer 78 >60 mL/min/1.73    Globulin 3.0 gm/dL    A/G Ratio 1.5 g/dL    BUN/Creatinine Ratio 14.3 7.0 - 25.0    Anion Gap 13.4 mmol/L   Lipase   Result Value Ref Range    Lipase 47 13 - 60 U/L   CBC Auto Differential   Result Value Ref Range    WBC 6.50 4.80 - 10.80 10*3/mm3    RBC 4.56 4.20 - 5.40 10*6/mm3    Hemoglobin 13.5 12.0 - 16.0 g/dL    Hematocrit 40.5 37.0 - 47.0 %    MCV 88.8 81.0 - 99.0 fL    MCH 29.6 27.0 - 31.0 pg    MCHC 33.3 31.0 - 37.0 g/dL    RDW 13.2 11.5 - 14.5 %    RDW-SD 42.6 37.0 - 54.0 fl    MPV 9.2 7.4 - 10.4 fL    Platelets 249 140 - 500 10*3/mm3    Neutrophil % 52.0 45.0 - 70.0 %    Lymphocyte % 36.8 20.0 - 45.0 %    Monocyte % 8.0 3.0 - 8.0 %    Eosinophil % 2.0 0.0 - 4.0 %    Basophil % 0.9 0.0 - 2.0 %    Immature Grans % 0.3 0.0 - 0.5 %    Neutrophils, Absolute 3.38 1.50 - 8.30 10*3/mm3    Lymphocytes, Absolute 2.39 0.60 - 4.80 10*3/mm3    Monocytes, Absolute 0.52 0.00 - 1.00 10*3/mm3    Eosinophils, Absolute 0.13 0.10 - 0.30 10*3/mm3    Basophils, Absolute 0.06 0.00 - 0.20 10*3/mm3    Immature Grans, Absolute 0.02 0.00 - 0.03 10*3/mm3    nRBC 0.0 0.0 - 0.0 /100 WBC           Neville was seen today for follow-up and herpes zoster.    Diagnoses and all orders for this visit:    Epigastric pain  -     omeprazole (priLOSEC) 40 MG capsule; Take 1 capsule by mouth 2 (Two) Times a Day.  -     Comprehensive Metabolic Panel  -     CBC & Differential  -     Amylase  -     Lipase      Keep f/u with gi .  Needs egd and testing for h.pylori    Outpatient Medications Prior to Visit   Medication Sig Dispense Refill   • albuterol (PROVENTIL HFA;VENTOLIN HFA) 108 (90 BASE) MCG/ACT inhaler Ventolin  (90 Base) MCG/ACT Inhalation Aerosol Solution; Patient Sig: Ventolin  (90  Base) MCG/ACT Inhalation Aerosol Solution INHALE 2-4 PUFFS Q 4 HOURS PRN/COUGH/WHEEZE; 1; 0; 21-Aug-2015; Active     • ALPRAZolam XR (XANAX XR) 0.5 MG 24 hr tablet Take 1 tablet by mouth Daily. 30 tablet 0   • azelastine (ASTEPRO) 0.15 % solution nasal spray 2 sprays into each nostril 2 (Two) Times a Day. 30 mL 1   • buPROPion XL (WELLBUTRIN XL) 150 MG 24 hr tablet Take 1 tablet by mouth daily.     • losartan-hydrochlorothiazide (HYZAAR) 100-12.5 MG per tablet TAKE 1 TABLET DAILY 90 tablet 3   • valACYclovir (VALTREX) 1000 MG tablet Take 1 tablet by mouth 3 (Three) Times a Day. 21 tablet 0   • venlafaxine XR (EFFEXOR-XR) 75 MG 24 hr capsule Take 1 capsule by mouth daily.     • omeprazole (priLOSEC) 20 MG capsule Take 2 capsules by mouth Daily. 90 capsule 0     No facility-administered medications prior to visit.      New Medications Ordered This Visit   Medications   • omeprazole (priLOSEC) 40 MG capsule     Sig: Take 1 capsule by mouth 2 (Two) Times a Day.     Dispense:  60 capsule     Refill:  0     [unfilled]  Medications Discontinued During This Encounter   Medication Reason   • omeprazole (priLOSEC) 20 MG capsule Reorder         Return if symptoms worsen or fail to improve.

## 2018-05-15 ENCOUNTER — OFFICE (OUTPATIENT)
Dept: URBAN - METROPOLITAN AREA CLINIC 66 | Facility: CLINIC | Age: 53
End: 2018-05-15

## 2018-05-15 VITALS
HEIGHT: 64 IN | HEART RATE: 72 BPM | DIASTOLIC BLOOD PRESSURE: 88 MMHG | SYSTOLIC BLOOD PRESSURE: 130 MMHG | WEIGHT: 172 LBS

## 2018-05-15 DIAGNOSIS — Z86.010 PERSONAL HISTORY OF COLONIC POLYPS: ICD-10-CM

## 2018-05-15 DIAGNOSIS — R10.13 EPIGASTRIC PAIN: ICD-10-CM

## 2018-05-15 DIAGNOSIS — D36.10 BENIGN NEOPLASM OF PERIPHERAL NERVES AND AUTONOMIC NERVOUS S: ICD-10-CM

## 2018-05-15 PROCEDURE — 99202 OFFICE O/P NEW SF 15 MIN: CPT | Performed by: INTERNAL MEDICINE

## 2018-06-22 ENCOUNTER — OFFICE VISIT (OUTPATIENT)
Dept: INTERNAL MEDICINE | Facility: CLINIC | Age: 53
End: 2018-06-22

## 2018-06-22 VITALS
HEIGHT: 64 IN | TEMPERATURE: 98.2 F | SYSTOLIC BLOOD PRESSURE: 118 MMHG | HEART RATE: 93 BPM | RESPIRATION RATE: 16 BRPM | WEIGHT: 168.6 LBS | BODY MASS INDEX: 28.79 KG/M2 | OXYGEN SATURATION: 98 % | DIASTOLIC BLOOD PRESSURE: 76 MMHG

## 2018-06-22 DIAGNOSIS — L71.9 ROSACEA: ICD-10-CM

## 2018-06-22 DIAGNOSIS — L20.89 OTHER ATOPIC DERMATITIS: Primary | ICD-10-CM

## 2018-06-22 PROCEDURE — 99213 OFFICE O/P EST LOW 20 MIN: CPT | Performed by: INTERNAL MEDICINE

## 2018-06-22 RX ORDER — PREDNISONE 10 MG/1
TABLET ORAL
Qty: 35 TABLET | Refills: 0 | Status: SHIPPED | OUTPATIENT
Start: 2018-06-22 | End: 2018-08-20 | Stop reason: HOSPADM

## 2018-06-22 RX ORDER — METHYLPREDNISOLONE ACETATE 80 MG/ML
80 INJECTION, SUSPENSION INTRA-ARTICULAR; INTRALESIONAL; INTRAMUSCULAR; SOFT TISSUE ONCE
Status: DISCONTINUED | OUTPATIENT
Start: 2018-06-22 | End: 2018-08-20 | Stop reason: HOSPADM

## 2018-06-22 NOTE — PATIENT INSTRUCTIONS
Poison Ivy Dermatitis  Poison ivy dermatitis is inflammation of the skin that is caused by the allergens on the leaves of the poison ivy plant. The skin reaction often involves redness, swelling, blisters, and extreme itching.  What are the causes?  This condition is caused by a specific chemical (urushiol) found in the sap of the poison ivy plant. This chemical is sticky and can be easily spread to people, animals, and objects. You can get poison ivy dermatitis by:  · Having direct contact with a poison ivy plant.  · Touching animals, other people, or objects that have come in contact with poison ivy and have the chemical on them.    What increases the risk?  This condition is more likely to develop in:  · People who are outdoors often.  · People who go outdoors without wearing protective clothing, such as closed shoes, long pants, and a long-sleeved shirt.    What are the signs or symptoms?  Symptoms of this condition include:  · Redness and itching.  · A rash that often includes bumps and blisters. The rash usually appears 48 hours after exposure.  · Swelling. This may occur if the reaction is more severe.    Symptoms usually last for 1-2 weeks. However, the first time you develop this condition, symptoms may last 3-4 weeks.  How is this diagnosed?  This condition may be diagnosed based on your symptoms and a physical exam. Your health care provider may also ask you about any recent outdoor activity.  How is this treated?  Treatment for this condition will vary depending on how severe it is. Treatment may include:  · Hydrocortisone creams or calamine lotions to relieve itching.  · Oatmeal baths to soothe the skin.  · Over-the-counter antihistamine tablets.  · Oral steroid medicine for more severe outbreaks.    Follow these instructions at home:  · Take or apply over-the-counter and prescription medicines only as told by your health care provider.  · Wash exposed skin as soon as possible with soap and cold  water.  · Use hydrocortisone creams or calamine lotion as needed to soothe the skin and relieve itching.  · Take oatmeal baths as needed. Use colloidal oatmeal. You can get this at your local pharmacy or grocery store. Follow the instructions on the packaging.  · Do not scratch or rub your skin.  · While you have the rash, wash clothes right after you wear them.  How is this prevented?  · Learn to identify the poison ivy plant and avoid contact with the plant. This plant can be recognized by the number of leaves. Generally, poison ivy has three leaves with flowering branches on a single stem. The leaves are typically glossy, and they have jagged edges that come to a point at the front.  · If you have been exposed to poison ivy, thoroughly wash with soap and water right away. You have about 30 minutes to remove the plant resin before it will cause the rash. Be sure to wash under your fingernails because any plant resin there will continue to spread the rash.  · When hiking or camping, wear clothes that will help you to avoid exposure on the skin. This includes long pants, a long-sleeved shirt, tall socks, and hiking boots. You can also apply preventive lotion to your skin to help limit exposure.  · If you suspect that your clothes or outdoor gear came in contact with poison ivy, rinse them off outside with a garden hose before you bring them inside your house.  Contact a health care provider if:  · You have open sores in the rash area.  · You have more redness, swelling, or pain in the affected area.  · You have redness that spreads beyond the rash area.  · You have fluid, blood, or pus coming from the affected area.  · You have a fever.  · You have a rash over a large area of your body.  · You have a rash on your eyes, mouth, or genitals.  · Your rash does not improve after a few days.  Get help right away if:  · Your face swells or your eyes swell shut.  · You have trouble breathing.  · You have trouble  swallowing.  This information is not intended to replace advice given to you by your health care provider. Make sure you discuss any questions you have with your health care provider.  Document Released: 12/15/2001 Document Revised: 05/25/2017 Document Reviewed: 05/25/2016  [employer] Patient Education © 2018 [employer]

## 2018-06-22 NOTE — PROGRESS NOTES
Neville Rodriges is a 52 y.o. female, who presents with a chief complaint of   Chief Complaint   Patient presents with   • Rash     itching, has spread, yard work, x days        HPI   53yo female with 2 days of rash on face now affecting eyes. L cheek is very swollen. Is using benadryl.  No tongue swelling or cough. No nVD.     Similar symptoms intermittently since childhood, one episode in childhood from poison ivy resulted in hospitalization.  Does have underlying rosacea and history of metrogel use, although not on right now.     T  The following portions of the patient's history were reviewed and updated as appropriate: allergies, current medications, past family history, past medical history, past social history, past surgical history and problem list.    Allergies: Ceftin [cefuroxime axetil]; Paroxetine; Ciprofloxacin; and Metronidazole    Current Outpatient Prescriptions:   •  albuterol (PROVENTIL HFA;VENTOLIN HFA) 108 (90 BASE) MCG/ACT inhaler, Ventolin  (90 Base) MCG/ACT Inhalation Aerosol Solution; Patient Sig: Ventolin  (90 Base) MCG/ACT Inhalation Aerosol Solution INHALE 2-4 PUFFS Q 4 HOURS PRN/COUGH/WHEEZE; 1; 0; 21-Aug-2015; Active, Disp: , Rfl:   •  ALPRAZolam XR (XANAX XR) 0.5 MG 24 hr tablet, Take 1 tablet by mouth Daily., Disp: 30 tablet, Rfl: 0  •  azelastine (ASTEPRO) 0.15 % solution nasal spray, 2 sprays into each nostril 2 (Two) Times a Day., Disp: 30 mL, Rfl: 1  •  buPROPion XL (WELLBUTRIN XL) 150 MG 24 hr tablet, Take 300 mg by mouth Daily., Disp: , Rfl:   •  losartan-hydrochlorothiazide (HYZAAR) 100-12.5 MG per tablet, TAKE 1 TABLET DAILY, Disp: 90 tablet, Rfl: 3  •  omeprazole (priLOSEC) 40 MG capsule, Take 1 capsule by mouth 2 (Two) Times a Day., Disp: 60 capsule, Rfl: 0  •  valACYclovir (VALTREX) 1000 MG tablet, Take 1 tablet by mouth 3 (Three) Times a Day., Disp: 21 tablet, Rfl: 0  •  venlafaxine XR (EFFEXOR-XR) 75 MG 24 hr capsule, Take 75 mg by mouth Daily., Disp: ,  "Rfl:   •  predniSONE (DELTASONE) 10 MG tablet, 5 for 2 days, 4 for 2 days, 3 for 2 days, 2 for 2 days, 1 for 2 days, Disp: 35 tablet, Rfl: 0    Current Facility-Administered Medications:   •  methylPREDNISolone acetate (DEPO-medrol) injection 80 mg, 80 mg, Intramuscular, Once, Kareem Tom MD  There are no discontinued medications.    Review of Systems   Constitutional: Negative.    HENT: Negative.    Respiratory: Negative for cough and shortness of breath.    Cardiovascular: Negative for chest pain.   Gastrointestinal: Negative for nausea.   Genitourinary: Negative.    Skin: Positive for rash.   Neurological: Negative.    Hematological: Negative.    Psychiatric/Behavioral: Negative.    All other systems reviewed and are negative.            /76 (BP Location: Right arm, Patient Position: Sitting, Cuff Size: Adult)   Pulse 93   Temp 98.2 °F (36.8 °C) (Oral)   Resp 16   Ht 162 cm (63.78\")   Wt 76.5 kg (168 lb 9.6 oz)   LMP  (LMP Unknown)   SpO2 98%   BMI 29.14 kg/m²       Physical Exam   Constitutional: She appears well-developed and well-nourished.   HENT:   Head: Normocephalic and atraumatic.   Eyes: EOM are normal. Pupils are equal, round, and reactive to light. Right eye exhibits no discharge. Left eye exhibits no discharge.   Cardiovascular: Normal rate and regular rhythm.    No murmur heard.  Pulmonary/Chest: Effort normal. No respiratory distress.   Abdominal: Soft.   Neurological: She is alert.   Skin: Skin is warm and dry. Capillary refill takes less than 2 seconds.   L periorbital skin swelling with no conjunctival irritation.  MP rash on cheeks with mild comedonal appearance. Arm and face with some vesicular appearance to sporadci lesions.    Psychiatric: She has a normal mood and affect. Her behavior is normal.   Vitals reviewed.      Lab Results (most recent)     None          Results for orders placed or performed in visit on 05/09/18   Comprehensive Metabolic Panel   Result Value Ref " Range    Glucose 87 65 - 99 mg/dL    BUN 14 6 - 20 mg/dL    Creatinine 0.82 0.57 - 1.00 mg/dL    eGFR Non African Am 73 >60 mL/min/1.73    eGFR African Am 88 >60 mL/min/1.73    BUN/Creatinine Ratio 17.1 7.0 - 25.0    Sodium 143 136 - 145 mmol/L    Potassium 4.4 3.5 - 5.2 mmol/L    Chloride 103 98 - 107 mmol/L    Total CO2 30.1 (H) 22.0 - 29.0 mmol/L    Calcium 9.6 8.6 - 10.5 mg/dL    Total Protein 6.3 6.0 - 8.5 g/dL    Albumin 4.30 3.50 - 5.20 g/dL    Globulin 2.0 gm/dL    A/G Ratio 2.2 g/dL    Total Bilirubin 0.3 0.2 - 1.2 mg/dL    Alkaline Phosphatase 66 40 - 129 U/L    AST (SGOT) 13 5 - 32 U/L    ALT (SGPT) 11 5 - 33 U/L   Amylase   Result Value Ref Range    Amylase 93 28 - 100 U/L   Lipase   Result Value Ref Range    Lipase 49 13 - 60 U/L   CBC & Differential   Result Value Ref Range    WBC 6.76 4.80 - 10.80 10*3/mm3    RBC 4.62 4.20 - 5.40 10*6/mm3    Hemoglobin 13.6 12.0 - 16.0 g/dL    Hematocrit 41.9 37.0 - 47.0 %    MCV 90.7 81.0 - 99.0 fL    MCH 29.4 27.0 - 31.0 pg    MCHC 32.5 31.0 - 37.0 g/dL    RDW 13.4 11.5 - 14.5 %    Platelets 295 140 - 500 10*3/mm3    Neutrophil Rel % 63.3 45.0 - 70.0 %    Lymphocyte Rel % 26.2 20.0 - 45.0 %    Monocyte Rel % 7.0 3.0 - 8.0 %    Eosinophil Rel % 1.8 0.0 - 4.0 %    Basophil Rel % 1.0 0.0 - 2.0 %    Neutrophils Absolute 4.28 1.50 - 8.30 10*3/mm3    Lymphocytes Absolute 1.77 0.60 - 4.80 10*3/mm3    Monocytes Absolute 0.47 0.00 - 1.00 10*3/mm3    Eosinophils Absolute 0.12 0.10 - 0.30 10*3/mm3    Basophils Absolute 0.07 0.00 - 0.20 10*3/mm3    Immature Granulocyte Rel % 0.7 (H) 0.0 - 0.5 %    Immature Grans Absolute 0.05 (H) 0.00 - 0.03 10*3/mm3    nRBC 0.0 0.0 - 0.0 /100 WBC           Neville was seen today for rash.    Diagnoses and all orders for this visit:    Other atopic dermatitis  -     methylPREDNISolone acetate (DEPO-medrol) injection 80 mg; Inject 1 mL into the shoulder, thigh, or buttocks 1 (One) Time.  -     predniSONE (DELTASONE) 10 MG tablet; 5 for 2 days, 4  for 2 days, 3 for 2 days, 2 for 2 days, 1 for 2 days    Rosacea      Claritin in am, benadryl at night.   Any dyspnea call ER, very low risk for any other complication  Start metrogel prn  For underlying rosacea.    Return if symptoms worsen or fail to improve.    Kareem Tom MD  06/22/2018

## 2018-07-12 ENCOUNTER — OFFICE VISIT (OUTPATIENT)
Dept: OBSTETRICS AND GYNECOLOGY | Facility: CLINIC | Age: 53
End: 2018-07-12

## 2018-07-12 VITALS
HEIGHT: 64 IN | BODY MASS INDEX: 28.34 KG/M2 | WEIGHT: 166 LBS | DIASTOLIC BLOOD PRESSURE: 70 MMHG | SYSTOLIC BLOOD PRESSURE: 112 MMHG

## 2018-07-12 DIAGNOSIS — Z12.39 SCREENING FOR BREAST CANCER: ICD-10-CM

## 2018-07-12 DIAGNOSIS — N95.2 VAGINAL ATROPHY: ICD-10-CM

## 2018-07-12 DIAGNOSIS — L43.9 LICHEN PLANUS: ICD-10-CM

## 2018-07-12 DIAGNOSIS — L90.0 LICHEN SCLEROSUS: ICD-10-CM

## 2018-07-12 DIAGNOSIS — Z13.9 SCREENING FOR CONDITION: Primary | ICD-10-CM

## 2018-07-12 PROCEDURE — 99213 OFFICE O/P EST LOW 20 MIN: CPT | Performed by: OBSTETRICS & GYNECOLOGY

## 2018-07-12 RX ORDER — LIOTHYRONINE SODIUM 5 UG/1
TABLET ORAL
Refills: 1 | COMMUNITY
Start: 2018-07-02 | End: 2019-10-30

## 2018-07-12 RX ORDER — LEVOTHYROXINE SODIUM 0.05 MG/1
TABLET ORAL
Refills: 1 | COMMUNITY
Start: 2018-07-02 | End: 2018-09-28 | Stop reason: DRUGHIGH

## 2018-07-15 PROBLEM — L43.9 LICHEN PLANUS: Status: ACTIVE | Noted: 2018-07-15

## 2018-07-15 PROBLEM — Z01.419 WELL WOMAN EXAM WITH ROUTINE GYNECOLOGICAL EXAM: Status: RESOLVED | Noted: 2017-10-17 | Resolved: 2018-07-15

## 2018-07-15 PROBLEM — N95.2 VAGINAL ATROPHY: Status: ACTIVE | Noted: 2018-07-15

## 2018-07-15 RX ORDER — CLOBETASOL PROPIONATE 0.5 MG/G
OINTMENT TOPICAL 2 TIMES DAILY
Qty: 60 G | Refills: 1 | Status: SHIPPED | OUTPATIENT
Start: 2018-07-15 | End: 2020-02-13

## 2018-07-15 RX ORDER — ESTRADIOL 0.1 MG/G
1 CREAM VAGINAL 2 TIMES WEEKLY
Qty: 45 G | Refills: 6 | Status: SHIPPED | OUTPATIENT
Start: 2018-07-16 | End: 2019-10-30 | Stop reason: SDUPTHER

## 2018-07-15 NOTE — PROGRESS NOTES
"      Neville Rodriges is a 52 y.o. patient who presents for follow up of   Chief Complaint   Patient presents with   • Follow-up     Meds       53 yo est pt of practice who is new to me here for f/u meds. She saw MERVIN Zhang for annual in 10/2017 and was given a steroid cream for presumed lichen sclerosis of the vulva. She did not have a biopsy. She was given an rx for clobetasol ointment and it has helped. She is only using it about once a month. She also is using Estrace cream topically and is not having as much dryness. She went to her dentist and was told she had lichen planus in her mouth. We discussed that her vulvar lesions could just as easily by lichen planus as well and I offered a biopsy today but she declined since her symptoms are so much better. We discussed that she needs yearly vulvar exams to look for SSCa and she should come in for any skin changes in between exams. She stopped eating meat and feels much better.       The following portions of the patient's history were reviewed and updated as appropriate: allergies, current medications and problem list.    Review of Systems   Gastrointestinal: Negative for abdominal pain.   Genitourinary: Negative for dyspareunia, genital sores, vaginal discharge and vaginal pain.   Musculoskeletal: Negative for arthralgias and myalgias.   Skin: Negative for color change, pallor, rash and wound.   All other systems reviewed and are negative.      /70   Ht 162 cm (63.78\")   Wt 75.3 kg (166 lb)   LMP  (LMP Unknown)   BMI 28.69 kg/m²     Physical Exam   Constitutional: She is oriented to person, place, and time. She appears well-developed and well-nourished.   HENT:   Head: Normocephalic and atraumatic.   Abdominal: Soft. Bowel sounds are normal. She exhibits no distension and no mass. There is no tenderness. There is no rebound and no guarding. No hernia.   Genitourinary: Vagina normal. Pelvic exam was performed with patient supine. There is no rash, " tenderness, lesion or injury on the right labia. There is no rash, tenderness, lesion or injury on the left labia. Right adnexum displays no mass and no tenderness. Left adnexum displays no mass, no tenderness and no fullness.   Genitourinary Comments: Mild atrophy noted   Neurological: She is alert and oriented to person, place, and time.   Skin: Skin is warm and dry.   Psychiatric: She has a normal mood and affect. Her behavior is normal. Judgment and thought content normal.   Nursing note and vitals reviewed.    A/P;  1. Lichen planus/sclerosis- refill clobetasol.   2. Atrohpy- improved. Refill Estrace cream.   3. C scope due next month.  4. Schedule DEXA and MMG  5. RTO > 10/17/18 for annual.     Assessment/Plan   Neville was seen today for follow-up.    Diagnoses and all orders for this visit:    Screening for condition  -     Mammo Screening Bilateral With CAD; Future  -     DEXA Bone Density Axial; Future    Other orders  -     estradiol (ESTRACE) 0.1 MG/GM vaginal cream; Insert 1 g into the vagina 2 (Two) Times a Week.  -     clobetasol (TEMOVATE) 0.05 % ointment; Apply  topically 2 (Two) Times a Day.                   No Follow-up on file.      Mei Padilla MD    7/12/18  6:50 PM

## 2018-07-16 ENCOUNTER — OFFICE (OUTPATIENT)
Dept: URBAN - METROPOLITAN AREA PATHOLOGY 4 | Facility: PATHOLOGY | Age: 53
End: 2018-07-16

## 2018-07-16 ENCOUNTER — AMBULATORY SURGICAL CENTER (OUTPATIENT)
Dept: URBAN - METROPOLITAN AREA SURGERY 20 | Facility: SURGERY | Age: 53
End: 2018-07-16
Payer: COMMERCIAL

## 2018-07-16 VITALS — HEIGHT: 64 IN

## 2018-07-16 DIAGNOSIS — K21.0 GASTRO-ESOPHAGEAL REFLUX DISEASE WITH ESOPHAGITIS: ICD-10-CM

## 2018-07-16 DIAGNOSIS — Z86.010 PERSONAL HISTORY OF COLONIC POLYPS: ICD-10-CM

## 2018-07-16 DIAGNOSIS — D12.3 BENIGN NEOPLASM OF TRANSVERSE COLON: ICD-10-CM

## 2018-07-16 DIAGNOSIS — R10.13 EPIGASTRIC PAIN: ICD-10-CM

## 2018-07-16 DIAGNOSIS — K29.50 UNSPECIFIED CHRONIC GASTRITIS WITHOUT BLEEDING: ICD-10-CM

## 2018-07-16 DIAGNOSIS — K57.30 DIVERTICULOSIS OF LARGE INTESTINE WITHOUT PERFORATION OR ABS: ICD-10-CM

## 2018-07-16 DIAGNOSIS — B96.81 HELICOBACTER PYLORI [H. PYLORI] AS THE CAUSE OF DISEASES CLA: ICD-10-CM

## 2018-07-16 LAB
GI HISTOLOGY: A. SELECT: (no result)
GI HISTOLOGY: B. SELECT: (no result)
GI HISTOLOGY: C. SELECT: (no result)
GI HISTOLOGY: D. SELECT: (no result)
GI HISTOLOGY: E. SELECT: (no result)
GI HISTOLOGY: PDF REPORT: (no result)

## 2018-07-16 PROCEDURE — 43239 EGD BIOPSY SINGLE/MULTIPLE: CPT | Mod: 59 | Performed by: INTERNAL MEDICINE

## 2018-07-16 PROCEDURE — 88305 TISSUE EXAM BY PATHOLOGIST: CPT | Performed by: INTERNAL MEDICINE

## 2018-07-16 PROCEDURE — 45380 COLONOSCOPY AND BIOPSY: CPT | Mod: 33 | Performed by: INTERNAL MEDICINE

## 2018-07-16 NOTE — SERVICEHPINOTES
feeling ok today,  does not think an abdominal wall source is the cause of symptoms feeling a little better lately,  prior h pylori infection

## 2018-08-18 ENCOUNTER — APPOINTMENT (OUTPATIENT)
Dept: ULTRASOUND IMAGING | Facility: HOSPITAL | Age: 53
End: 2018-08-18

## 2018-08-18 ENCOUNTER — HOSPITAL ENCOUNTER (INPATIENT)
Facility: HOSPITAL | Age: 53
LOS: 2 days | Discharge: HOME | End: 2018-08-20
Attending: EMERGENCY MEDICINE | Admitting: INTERNAL MEDICINE

## 2018-08-18 ENCOUNTER — APPOINTMENT (OUTPATIENT)
Dept: CT IMAGING | Facility: HOSPITAL | Age: 53
End: 2018-08-18

## 2018-08-18 DIAGNOSIS — K85.90 ACUTE PANCREATITIS, UNSPECIFIED COMPLICATION STATUS, UNSPECIFIED PANCREATITIS TYPE: Primary | ICD-10-CM

## 2018-08-18 LAB
ALBUMIN SERPL-MCNC: 3.6 G/DL (ref 3.5–5.2)
ALBUMIN SERPL-MCNC: 4.4 G/DL (ref 3.5–5.2)
ALBUMIN/GLOB SERPL: 1.9 G/DL
ALBUMIN/GLOB SERPL: 2 G/DL
ALP SERPL-CCNC: 76 U/L (ref 40–129)
ALP SERPL-CCNC: 81 U/L (ref 40–129)
ALT SERPL W P-5'-P-CCNC: 335 U/L (ref 5–33)
ALT SERPL W P-5'-P-CCNC: 44 U/L (ref 5–33)
AMYLASE SERPL-CCNC: 641 U/L (ref 28–100)
ANION GAP SERPL CALCULATED.3IONS-SCNC: 13.8 MMOL/L
ANION GAP SERPL CALCULATED.3IONS-SCNC: 8.8 MMOL/L
AST SERPL-CCNC: 662 U/L (ref 5–32)
AST SERPL-CCNC: 95 U/L (ref 5–32)
BASOPHILS # BLD AUTO: 0.05 10*3/MM3 (ref 0–0.2)
BASOPHILS NFR BLD AUTO: 0.6 % (ref 0–2)
BILIRUB SERPL-MCNC: 0.3 MG/DL (ref 0.2–1.2)
BILIRUB SERPL-MCNC: 0.5 MG/DL (ref 0.2–1.2)
BILIRUB UR QL STRIP: NEGATIVE
BUN BLD-MCNC: 14 MG/DL (ref 6–20)
BUN BLD-MCNC: 17 MG/DL (ref 6–20)
BUN/CREAT SERPL: 15.9 (ref 7–25)
BUN/CREAT SERPL: 16.7 (ref 7–25)
CALCIUM SPEC-SCNC: 8.1 MG/DL (ref 8.6–10.5)
CALCIUM SPEC-SCNC: 9.4 MG/DL (ref 8.6–10.5)
CHLORIDE SERPL-SCNC: 102 MMOL/L (ref 98–107)
CHLORIDE SERPL-SCNC: 107 MMOL/L (ref 98–107)
CHOLEST SERPL-MCNC: 129 MG/DL (ref 0–200)
CLARITY UR: CLEAR
CO2 SERPL-SCNC: 24.2 MMOL/L (ref 22–29)
CO2 SERPL-SCNC: 24.2 MMOL/L (ref 22–29)
COLOR UR: YELLOW
CREAT BLD-MCNC: 0.88 MG/DL (ref 0.57–1)
CREAT BLD-MCNC: 1.02 MG/DL (ref 0.57–1)
D-LACTATE SERPL-SCNC: 1.3 MMOL/L (ref 0.5–2)
DEPRECATED RDW RBC AUTO: 42.8 FL (ref 37–54)
EOSINOPHIL # BLD AUTO: 0.16 10*3/MM3 (ref 0.1–0.3)
EOSINOPHIL NFR BLD AUTO: 1.8 % (ref 0–4)
ERYTHROCYTE [DISTWIDTH] IN BLOOD BY AUTOMATED COUNT: 13.1 % (ref 11.5–14.5)
GFR SERPL CREATININE-BSD FRML MDRD: 56 ML/MIN/1.73
GFR SERPL CREATININE-BSD FRML MDRD: 67 ML/MIN/1.73
GLOBULIN UR ELPH-MCNC: 1.8 GM/DL
GLOBULIN UR ELPH-MCNC: 2.3 GM/DL
GLUCOSE BLD-MCNC: 105 MG/DL (ref 65–99)
GLUCOSE BLD-MCNC: 115 MG/DL (ref 65–99)
GLUCOSE UR STRIP-MCNC: NEGATIVE MG/DL
HBA1C MFR BLD: 5.8 % (ref 4.8–5.6)
HCT VFR BLD AUTO: 39.1 % (ref 37–47)
HDLC SERPL-MCNC: 55 MG/DL (ref 40–60)
HGB BLD-MCNC: 12.9 G/DL (ref 12–16)
HGB UR QL STRIP.AUTO: NEGATIVE
IMM GRANULOCYTES # BLD: 0.02 10*3/MM3 (ref 0–0.03)
IMM GRANULOCYTES NFR BLD: 0.2 % (ref 0–0.5)
KETONES UR QL STRIP: NEGATIVE
LDLC SERPL CALC-MCNC: 60 MG/DL (ref 0–100)
LDLC/HDLC SERPL: 1.09 {RATIO}
LEUKOCYTE ESTERASE UR QL STRIP.AUTO: NEGATIVE
LIPASE SERPL-CCNC: 1042 U/L (ref 13–60)
LIPASE SERPL-CCNC: 1110 U/L (ref 13–60)
LYMPHOCYTES # BLD AUTO: 2.84 10*3/MM3 (ref 0.6–4.8)
LYMPHOCYTES NFR BLD AUTO: 32.5 % (ref 20–45)
MCH RBC QN AUTO: 29.7 PG (ref 27–31)
MCHC RBC AUTO-ENTMCNC: 33 G/DL (ref 31–37)
MCV RBC AUTO: 89.9 FL (ref 81–99)
MONOCYTES # BLD AUTO: 0.76 10*3/MM3 (ref 0–1)
MONOCYTES NFR BLD AUTO: 8.7 % (ref 3–8)
NEUTROPHILS # BLD AUTO: 4.92 10*3/MM3 (ref 1.5–8.3)
NEUTROPHILS NFR BLD AUTO: 56.2 % (ref 45–70)
NITRITE UR QL STRIP: NEGATIVE
NRBC BLD MANUAL-RTO: 0 /100 WBC (ref 0–0)
PH UR STRIP.AUTO: 7 [PH] (ref 4.5–8)
PLATELET # BLD AUTO: 256 10*3/MM3 (ref 140–500)
PMV BLD AUTO: 9.5 FL (ref 7.4–10.4)
POTASSIUM BLD-SCNC: 4 MMOL/L (ref 3.5–5.2)
POTASSIUM BLD-SCNC: 4.1 MMOL/L (ref 3.5–5.2)
PROT SERPL-MCNC: 5.4 G/DL (ref 6–8.5)
PROT SERPL-MCNC: 6.7 G/DL (ref 6–8.5)
PROT UR QL STRIP: NEGATIVE
RBC # BLD AUTO: 4.35 10*6/MM3 (ref 4.2–5.4)
SODIUM BLD-SCNC: 140 MMOL/L (ref 136–145)
SODIUM BLD-SCNC: 140 MMOL/L (ref 136–145)
SP GR UR STRIP: 1.01 (ref 1–1.03)
TRIGL SERPL-MCNC: 70 MG/DL (ref 0–150)
UROBILINOGEN UR QL STRIP: NORMAL
VLDLC SERPL-MCNC: 14 MG/DL (ref 7–27)
WBC NRBC COR # BLD: 8.75 10*3/MM3 (ref 4.8–10.8)

## 2018-08-18 PROCEDURE — 25010000002 MORPHINE PER 10 MG: Performed by: FAMILY MEDICINE

## 2018-08-18 PROCEDURE — 86644 CMV ANTIBODY: CPT | Performed by: INTERNAL MEDICINE

## 2018-08-18 PROCEDURE — 80053 COMPREHEN METABOLIC PANEL: CPT | Performed by: FAMILY MEDICINE

## 2018-08-18 PROCEDURE — 86645 CMV ANTIBODY IGM: CPT | Performed by: INTERNAL MEDICINE

## 2018-08-18 PROCEDURE — 63710000001 PROMETHAZINE PER 12.5 MG: Performed by: FAMILY MEDICINE

## 2018-08-18 PROCEDURE — 99284 EMERGENCY DEPT VISIT MOD MDM: CPT

## 2018-08-18 PROCEDURE — 80074 ACUTE HEPATITIS PANEL: CPT | Performed by: INTERNAL MEDICINE

## 2018-08-18 PROCEDURE — 99223 1ST HOSP IP/OBS HIGH 75: CPT | Performed by: INTERNAL MEDICINE

## 2018-08-18 PROCEDURE — 86665 EPSTEIN-BARR CAPSID VCA: CPT | Performed by: INTERNAL MEDICINE

## 2018-08-18 PROCEDURE — 85025 COMPLETE CBC W/AUTO DIFF WBC: CPT | Performed by: EMERGENCY MEDICINE

## 2018-08-18 PROCEDURE — 0 IOPAMIDOL PER 1 ML: Performed by: FAMILY MEDICINE

## 2018-08-18 PROCEDURE — 25010000002 HYDROMORPHONE PER 4 MG: Performed by: EMERGENCY MEDICINE

## 2018-08-18 PROCEDURE — 80061 LIPID PANEL: CPT | Performed by: INTERNAL MEDICINE

## 2018-08-18 PROCEDURE — 83036 HEMOGLOBIN GLYCOSYLATED A1C: CPT | Performed by: INTERNAL MEDICINE

## 2018-08-18 PROCEDURE — 83605 ASSAY OF LACTIC ACID: CPT | Performed by: FAMILY MEDICINE

## 2018-08-18 PROCEDURE — 86663 EPSTEIN-BARR ANTIBODY: CPT | Performed by: INTERNAL MEDICINE

## 2018-08-18 PROCEDURE — 80053 COMPREHEN METABOLIC PANEL: CPT | Performed by: EMERGENCY MEDICINE

## 2018-08-18 PROCEDURE — 25010000002 ENOXAPARIN PER 10 MG: Performed by: FAMILY MEDICINE

## 2018-08-18 PROCEDURE — 99284 EMERGENCY DEPT VISIT MOD MDM: CPT | Performed by: EMERGENCY MEDICINE

## 2018-08-18 PROCEDURE — 81003 URINALYSIS AUTO W/O SCOPE: CPT | Performed by: EMERGENCY MEDICINE

## 2018-08-18 PROCEDURE — 25010000002 ONDANSETRON PER 1 MG: Performed by: EMERGENCY MEDICINE

## 2018-08-18 PROCEDURE — 86664 EPSTEIN-BARR NUCLEAR ANTIGEN: CPT | Performed by: INTERNAL MEDICINE

## 2018-08-18 PROCEDURE — 83690 ASSAY OF LIPASE: CPT | Performed by: EMERGENCY MEDICINE

## 2018-08-18 PROCEDURE — 83690 ASSAY OF LIPASE: CPT | Performed by: FAMILY MEDICINE

## 2018-08-18 PROCEDURE — 82150 ASSAY OF AMYLASE: CPT | Performed by: FAMILY MEDICINE

## 2018-08-18 PROCEDURE — 25010000002 DICYCLOMINE PER 20 MG: Performed by: EMERGENCY MEDICINE

## 2018-08-18 PROCEDURE — 99254 IP/OBS CNSLTJ NEW/EST MOD 60: CPT | Performed by: INTERNAL MEDICINE

## 2018-08-18 PROCEDURE — 0 DIATRIZOATE MEGLUMINE & SODIUM PER 1 ML: Performed by: FAMILY MEDICINE

## 2018-08-18 PROCEDURE — 76705 ECHO EXAM OF ABDOMEN: CPT

## 2018-08-18 PROCEDURE — 74177 CT ABD & PELVIS W/CONTRAST: CPT

## 2018-08-18 RX ORDER — SODIUM CHLORIDE 9 MG/ML
40 INJECTION, SOLUTION INTRAVENOUS AS NEEDED
Status: DISCONTINUED | OUTPATIENT
Start: 2018-08-18 | End: 2018-08-20 | Stop reason: HOSPADM

## 2018-08-18 RX ORDER — PROMETHAZINE HYDROCHLORIDE 25 MG/ML
12.5 INJECTION, SOLUTION INTRAMUSCULAR; INTRAVENOUS EVERY 6 HOURS PRN
Status: DISCONTINUED | OUTPATIENT
Start: 2018-08-18 | End: 2018-08-19 | Stop reason: SDUPTHER

## 2018-08-18 RX ORDER — PROMETHAZINE HYDROCHLORIDE 12.5 MG/1
12.5 TABLET ORAL EVERY 6 HOURS PRN
Status: DISCONTINUED | OUTPATIENT
Start: 2018-08-18 | End: 2018-08-20 | Stop reason: HOSPADM

## 2018-08-18 RX ORDER — ALPRAZOLAM 0.25 MG/1
0.5 TABLET ORAL 2 TIMES DAILY PRN
Status: DISCONTINUED | OUTPATIENT
Start: 2018-08-18 | End: 2018-08-20 | Stop reason: HOSPADM

## 2018-08-18 RX ORDER — HYDROMORPHONE HCL 110MG/55ML
1 PATIENT CONTROLLED ANALGESIA SYRINGE INTRAVENOUS ONCE
Status: COMPLETED | OUTPATIENT
Start: 2018-08-18 | End: 2018-08-18

## 2018-08-18 RX ORDER — BACLOFEN 10 MG/1
5 TABLET ORAL ONCE
Status: COMPLETED | OUTPATIENT
Start: 2018-08-18 | End: 2018-08-18

## 2018-08-18 RX ORDER — SODIUM CHLORIDE, SODIUM LACTATE, POTASSIUM CHLORIDE, CALCIUM CHLORIDE 600; 310; 30; 20 MG/100ML; MG/100ML; MG/100ML; MG/100ML
1000 INJECTION, SOLUTION INTRAVENOUS ONCE
Status: COMPLETED | OUTPATIENT
Start: 2018-08-18 | End: 2018-08-18

## 2018-08-18 RX ORDER — NALOXONE HCL 0.4 MG/ML
0.4 VIAL (ML) INJECTION
Status: DISCONTINUED | OUTPATIENT
Start: 2018-08-18 | End: 2018-08-20 | Stop reason: HOSPADM

## 2018-08-18 RX ORDER — ONDANSETRON 2 MG/ML
4 INJECTION INTRAMUSCULAR; INTRAVENOUS ONCE
Status: COMPLETED | OUTPATIENT
Start: 2018-08-18 | End: 2018-08-18

## 2018-08-18 RX ORDER — PANTOPRAZOLE SODIUM 40 MG/1
40 TABLET, DELAYED RELEASE ORAL EVERY MORNING
Status: DISCONTINUED | OUTPATIENT
Start: 2018-08-18 | End: 2018-08-20

## 2018-08-18 RX ORDER — BUPROPION HYDROCHLORIDE 150 MG/1
150 TABLET ORAL DAILY
Status: DISCONTINUED | OUTPATIENT
Start: 2018-08-18 | End: 2018-08-20 | Stop reason: HOSPADM

## 2018-08-18 RX ORDER — SODIUM CHLORIDE 9 MG/ML
150 INJECTION, SOLUTION INTRAVENOUS CONTINUOUS
Status: DISCONTINUED | OUTPATIENT
Start: 2018-08-18 | End: 2018-08-19

## 2018-08-18 RX ORDER — PROMETHAZINE HYDROCHLORIDE 12.5 MG/1
12.5 SUPPOSITORY RECTAL EVERY 6 HOURS PRN
Status: DISCONTINUED | OUTPATIENT
Start: 2018-08-18 | End: 2018-08-20 | Stop reason: HOSPADM

## 2018-08-18 RX ORDER — PROMETHAZINE HYDROCHLORIDE 25 MG/ML
12.5 INJECTION, SOLUTION INTRAMUSCULAR; INTRAVENOUS EVERY 6 HOURS PRN
Status: DISCONTINUED | OUTPATIENT
Start: 2018-08-18 | End: 2018-08-20 | Stop reason: HOSPADM

## 2018-08-18 RX ORDER — SULFAMETHOXAZOLE AND TRIMETHOPRIM 800; 160 MG/1; MG/1
1 TABLET ORAL 2 TIMES DAILY
COMMUNITY
End: 2018-08-20 | Stop reason: HOSPADM

## 2018-08-18 RX ORDER — LEVOTHYROXINE SODIUM 0.05 MG/1
50 TABLET ORAL
Status: DISCONTINUED | OUTPATIENT
Start: 2018-08-18 | End: 2018-08-18

## 2018-08-18 RX ORDER — DICYCLOMINE HYDROCHLORIDE 10 MG/ML
20 INJECTION INTRAMUSCULAR ONCE
Status: COMPLETED | OUTPATIENT
Start: 2018-08-18 | End: 2018-08-18

## 2018-08-18 RX ORDER — ACETAMINOPHEN 325 MG/1
650 TABLET ORAL EVERY 6 HOURS PRN
Status: DISCONTINUED | OUTPATIENT
Start: 2018-08-18 | End: 2018-08-20 | Stop reason: HOSPADM

## 2018-08-18 RX ORDER — BACLOFEN 10 MG/1
5 TABLET ORAL EVERY 8 HOURS PRN
Status: DISCONTINUED | OUTPATIENT
Start: 2018-08-18 | End: 2018-08-20 | Stop reason: HOSPADM

## 2018-08-18 RX ORDER — LEVOTHYROXINE SODIUM 0.1 MG/1
100 TABLET ORAL
Status: DISCONTINUED | OUTPATIENT
Start: 2018-08-19 | End: 2018-08-20 | Stop reason: HOSPADM

## 2018-08-18 RX ORDER — FAMOTIDINE 10 MG/ML
20 INJECTION, SOLUTION INTRAVENOUS ONCE
Status: COMPLETED | OUTPATIENT
Start: 2018-08-18 | End: 2018-08-18

## 2018-08-18 RX ORDER — MORPHINE SULFATE 2 MG/ML
1 INJECTION, SOLUTION INTRAMUSCULAR; INTRAVENOUS EVERY 4 HOURS PRN
Status: DISCONTINUED | OUTPATIENT
Start: 2018-08-18 | End: 2018-08-18

## 2018-08-18 RX ORDER — SODIUM CHLORIDE 0.9 % (FLUSH) 0.9 %
10 SYRINGE (ML) INJECTION AS NEEDED
Status: DISCONTINUED | OUTPATIENT
Start: 2018-08-18 | End: 2018-08-20 | Stop reason: HOSPADM

## 2018-08-18 RX ORDER — LEVOTHYROXINE SODIUM 0.1 MG/1
TABLET ORAL
Status: COMPLETED
Start: 2018-08-18 | End: 2018-08-18

## 2018-08-18 RX ORDER — SODIUM CHLORIDE 0.9 % (FLUSH) 0.9 %
1-10 SYRINGE (ML) INJECTION AS NEEDED
Status: DISCONTINUED | OUTPATIENT
Start: 2018-08-18 | End: 2018-08-20 | Stop reason: HOSPADM

## 2018-08-18 RX ORDER — VENLAFAXINE HYDROCHLORIDE 37.5 MG/1
75 CAPSULE, EXTENDED RELEASE ORAL DAILY
Status: DISCONTINUED | OUTPATIENT
Start: 2018-08-18 | End: 2018-08-20

## 2018-08-18 RX ADMIN — SODIUM CHLORIDE 150 ML/HR: 9 INJECTION, SOLUTION INTRAVENOUS at 15:05

## 2018-08-18 RX ADMIN — VENLAFAXINE HYDROCHLORIDE 75 MG: 37.5 CAPSULE, EXTENDED RELEASE ORAL at 08:45

## 2018-08-18 RX ADMIN — DICYCLOMINE HYDROCHLORIDE 20 MG: 20 INJECTION, SOLUTION INTRAMUSCULAR at 01:39

## 2018-08-18 RX ADMIN — ONDANSETRON 4 MG: 2 INJECTION INTRAMUSCULAR; INTRAVENOUS at 02:28

## 2018-08-18 RX ADMIN — HYDROCHLOROTHIAZIDE: 25 TABLET ORAL at 08:45

## 2018-08-18 RX ADMIN — PANTOPRAZOLE SODIUM 40 MG: 40 TABLET, DELAYED RELEASE ORAL at 06:46

## 2018-08-18 RX ADMIN — BACLOFEN 5 MG: 10 TABLET ORAL at 10:03

## 2018-08-18 RX ADMIN — SODIUM CHLORIDE 1000 ML: 9 INJECTION, SOLUTION INTRAVENOUS at 02:39

## 2018-08-18 RX ADMIN — MORPHINE SULFATE 1 MG: 2 INJECTION, SOLUTION INTRAMUSCULAR; INTRAVENOUS at 06:46

## 2018-08-18 RX ADMIN — LEVOTHYROXINE SODIUM 100 MCG: 100 TABLET ORAL at 06:45

## 2018-08-18 RX ADMIN — FAMOTIDINE 20 MG: 10 INJECTION INTRAVENOUS at 01:40

## 2018-08-18 RX ADMIN — PROMETHAZINE HYDROCHLORIDE 12.5 MG: 12.5 TABLET ORAL at 15:10

## 2018-08-18 RX ADMIN — HYDROMORPHONE HYDROCHLORIDE 1 MG: 2 INJECTION INTRAMUSCULAR; INTRAVENOUS; SUBCUTANEOUS at 02:30

## 2018-08-18 RX ADMIN — SODIUM CHLORIDE 100 ML/HR: 9 INJECTION, SOLUTION INTRAVENOUS at 05:07

## 2018-08-18 RX ADMIN — IOPAMIDOL 100 ML: 755 INJECTION, SOLUTION INTRAVENOUS at 18:45

## 2018-08-18 RX ADMIN — LEVOTHYROXINE SODIUM 100 MCG: 0.05 TABLET ORAL at 06:45

## 2018-08-18 RX ADMIN — DIATRIZOATE MEGLUMINE AND DIATRIZOATE SODIUM 30 ML: 600; 100 SOLUTION ORAL; RECTAL at 16:45

## 2018-08-18 RX ADMIN — BUPROPION HYDROCHLORIDE 150 MG: 150 TABLET, FILM COATED, EXTENDED RELEASE ORAL at 08:45

## 2018-08-18 RX ADMIN — SODIUM CHLORIDE, POTASSIUM CHLORIDE, SODIUM LACTATE AND CALCIUM CHLORIDE 1000 ML: 600; 310; 30; 20 INJECTION, SOLUTION INTRAVENOUS at 16:29

## 2018-08-18 RX ADMIN — ENOXAPARIN SODIUM 40 MG: 40 INJECTION SUBCUTANEOUS at 08:46

## 2018-08-18 NOTE — PROGRESS NOTES
Patient Care Team:  Meryl Mesa MD as PCP - General  Meryl Mesa MD as PCP - Family Medicine    CHIEF COMPLAINT: Pancreatitis    HISTORY OF PRESENT ILLNESS:    53 with sudden onset of Pain at Midnight last PM and has a history of gall stones and osbaldo n 1996  As well as Panceratitis 2-3 months later and several episodes consistant with pancratitis since that time but not this intense. LFTs elevated too and has recently started Cytomel thru 25 again and also has had several UTIs and has been treating with Bactrim. No previous history of Mono. No sig alcohol and no history of hepatitis nor sick contacts.    Has had very recent EGD colon which were unremarkable as  Well as a CT in April.      Past Medical History:   Diagnosis Date   • Colon polyps     c-scope was fall 2014. due for repeat in 2-3 years.   • Depression    • Gastroesophageal reflux disease 2/28/2017   • Headache     More problems when big fluctuations in weather   • History of frequent urinary tract infections    • Humerus fracture    • Hypercholesterolemia     High Triglycerides   • Hypertension    • Pancreatitis    • Plantar fasciitis    • Sleep apnea      Past Surgical History:   Procedure Laterality Date   • APPENDECTOMY     • CHOLECYSTECTOMY     • COLONOSCOPY     • HYSTERECTOMY      Partial   • SINUS SURGERY     • STOMACH SURGERY  12/10/2013    Gastric sleve   • TONSILLECTOMY       Family History   Problem Relation Age of Onset   • Diabetes Mother    • Anxiety disorder Mother    • Depression Mother    • Diabetes Father    • Heart disease Father         CABG x 3 in early 60s   • Hypertension Father    • Anxiety disorder Sister    • Depression Sister    • Cancer Daughter         Synovial Cell Sarcoma     Social History   Substance Use Topics   • Smoking status: Never Smoker   • Smokeless tobacco: Not on file   • Alcohol use 0.6 oz/week     1 Glasses of wine per week      Comment: 2 X PER MONTH      Facility-Administered  Medications Prior to Admission   Medication Dose Route Frequency Provider Last Rate Last Dose   • methylPREDNISolone acetate (DEPO-medrol) injection 80 mg  80 mg Intramuscular Once Kareem Tom MD         Prescriptions Prior to Admission   Medication Sig Dispense Refill Last Dose   • albuterol (PROVENTIL HFA;VENTOLIN HFA) 108 (90 BASE) MCG/ACT inhaler Ventolin  (90 Base) MCG/ACT Inhalation Aerosol Solution; Patient Sig: Ventolin  (90 Base) MCG/ACT Inhalation Aerosol Solution INHALE 2-4 PUFFS Q 4 HOURS PRN/COUGH/WHEEZE; 1; 0; 21-Aug-2015; Active   Taking   • ALPRAZolam XR (XANAX XR) 0.5 MG 24 hr tablet Take 1 tablet by mouth Daily. (Patient taking differently: Take 0.5 mg by mouth Daily As Needed.) 30 tablet 0 Past Month at Unknown time   • azelastine (ASTEPRO) 0.15 % solution nasal spray 2 sprays into each nostril 2 (Two) Times a Day. (Patient taking differently: 2 sprays into the nostril(s) as directed by provider 2 (Two) Times a Day As Needed.) 30 mL 1 Taking   • buPROPion XL (WELLBUTRIN XL) 150 MG 24 hr tablet Take 150 mg by mouth Daily.   Past Week at Unknown time   • Cholecalciferol (VITAMIN D3) 11042 units tablet chew one tablet by mouth every day WITH FOOD  1    • clobetasol (TEMOVATE) 0.05 % ointment Apply  topically 2 (Two) Times a Day. 60 g 1    • estradiol (ESTRACE) 0.1 MG/GM vaginal cream Insert 1 g into the vagina 2 (Two) Times a Week. 45 g 6 Past Month at Unknown time   • levothyroxine (SYNTHROID, LEVOTHROID) 50 MCG tablet take two  Tablets by mouth every morning on an empty stomach total 100mcg  1 8/17/2018 at Unknown time   • liothyronine (CYTOMEL) 5 MCG tablet take one Tablet by mouth every morning on an empty stomach  1 8/17/2018 at Unknown time   • losartan-hydrochlorothiazide (HYZAAR) 100-12.5 MG per tablet TAKE 1 TABLET DAILY 90 tablet 3 8/17/2018 at Unknown time   • omeprazole (priLOSEC) 40 MG capsule Take 1 capsule by mouth 2 (Two) Times a Day. 60 capsule 0 Past Week at  "Unknown time   • Prasterone, DHEA, (DHEA 50 PO) TAKE ONE Capsule BY MOUTH EVERY DAY before noon  1 8/17/2018 at Unknown time   • Progesterone Micronized (PROGESTERONE PO) TAKE ONE Capsule BY MOUTH EVERY NIGHT AT BEDTIME  1 8/17/2018 at Unknown time   • sulfamethoxazole-trimethoprim (BACTRIM DS,SEPTRA DS) 800-160 MG per tablet Take 1 tablet by mouth 2 (Two) Times a Day.      • venlafaxine XR (EFFEXOR-XR) 75 MG 24 hr capsule Take 75 mg by mouth Daily.   Past Week at Unknown time   • predniSONE (DELTASONE) 10 MG tablet 5 for 2 days, 4 for 2 days, 3 for 2 days, 2 for 2 days, 1 for 2 days 35 tablet 0    • valACYclovir (VALTREX) 1000 MG tablet Take 1 tablet by mouth 3 (Three) Times a Day. 21 tablet 0 Taking     Allergies:  Ceftin [cefuroxime axetil]; Paroxetine; Ciprofloxacin; and Metronidazole    REVIEW OF SYSTEMS:  Please see the above history of present illness for pertinent positives and negatives.  The remainder of the patient's systems have been reviewed and are negative.     Vital Signs  Temp:  [97.1 °F (36.2 °C)-98.4 °F (36.9 °C)] 98 °F (36.7 °C)  Heart Rate:  [65-77] 65  Resp:  [20-22] 20  BP: ()/(56-91) 98/67    Flowsheet Rows      First Filed Value   Admission Height  162 cm (63.78\") Documented at 08/18/2018 0113   Admission Weight  74.4 kg (164 lb) Documented at 08/18/2018 0113           Physical Exam:  Physical Exam   Constitutional: Patient appears well-developed and well-nourished and in no acute distress   HEENT:   Head: Normocephalic and atraumatic.   Eyes:  Pupils are equal, round, and reactive to light. EOM are intact. Sclera are anicteric and non-injected.  Mouth and Throat: Patient has moist mucous membranes. Oropharynx is clear of any erythema or exudate.     Neck: Neck supple. No JVD present. No thyromegaly present. No lymphadenopathy present.  Cardiovascular: Regular rate, regular rhythm, S1 normal and S2 normal.  Exam reveals no gallop and no friction rub.  No murmur heard.  Pulmonary/Chest: " Lungs are clear to auscultation bilaterally. No respiratory distress. No wheezes. No rhonchi. No rales.   Abdominal: Soft. Bowel sounds are normal. No distension and no mass. There is no hepatosplenomegaly. There is Moderate RLQ RUQ Epigastric tenderness.   Musculoskeletal: Normal Muscle tone  Extremities: No edema. Pulses are palpable in all 4 extremities.  Neurological: Patient is alert and oriented to person, place, and time. Cranial nerves II-XII are grossly intact with no focal deficits.  Skin: Skin is warm. No rash noted. Nails show no clubbing.  No cyanosis or erythema.     Results Review:    I reviewed the patient's new clinical results.  Lab Results (most recent)     Procedure Component Value Units Date/Time    Lipid Panel [[id number]] Collected:  08/18/18 1105    Specimen:  Blood Updated:  08/18/18 1126     Total Cholesterol 129 mg/dL      Triglycerides 70 mg/dL      HDL Cholesterol 55 mg/dL      LDL Cholesterol  60 mg/dL      VLDL Cholesterol 14 mg/dL      LDL/HDL Ratio 1.09    Hemoglobin A1c [[id number]]  (Abnormal) Collected:  08/18/18 1105    Specimen:  Blood Updated:  08/18/18 1125     Hemoglobin A1C 5.80 (H) %     Narrative:       Hemoglobin A1C Ranges:    Increased Risk for Diabetes  5.7% to 6.4%  Diabetes                     >= 6.5%  Diabetic Goal                < 7.0%    Hepatitis Panel, Acute [[id number]] Collected:  08/18/18 1105    Specimen:  Blood Updated:  08/18/18 1107    Lipase [[id number]]  (Abnormal) Collected:  08/18/18 0609    Specimen:  Blood Updated:  08/18/18 0702     Lipase 1,042 (H) U/L     Comprehensive Metabolic Panel [[id number]]  (Abnormal) Collected:  08/18/18 0609    Specimen:  Blood Updated:  08/18/18 0702     Glucose 115 (H) mg/dL      BUN 14 mg/dL      Creatinine 0.88 mg/dL      Sodium 140 mmol/L      Potassium 4.1 mmol/L      Chloride 107 mmol/L      CO2 24.2 mmol/L      Calcium 8.1 (L) mg/dL      Total Protein 5.4 (L) g/dL      Albumin 3.60 g/dL      ALT (SGPT) 335 (H) U/L       AST (SGOT) 662 (H) U/L      Alkaline Phosphatase 81 U/L      Total Bilirubin 0.5 mg/dL      eGFR Non African Amer 67 mL/min/1.73      Globulin 1.8 gm/dL      A/G Ratio 2.0 g/dL      BUN/Creatinine Ratio 15.9     Anion Gap 8.8 mmol/L     Amylase [[id number]]  (Abnormal) Collected:  08/18/18 0609    Specimen:  Blood Updated:  08/18/18 0658     Amylase 641 (H) U/L     Lactic Acid, Plasma [[id number]]  (Normal) Collected:  08/18/18 0609    Specimen:  Blood Updated:  08/18/18 0652     Lactate 1.3 mmol/L     Lipase [[id number]]  (Abnormal) Collected:  08/18/18 0134    Specimen:  Blood Updated:  08/18/18 0204     Lipase 1,110 (H) U/L     Urinalysis With Microscopic If Indicated (No Culture) - Urine, Clean Catch [[id number]]  (Normal) Collected:  08/18/18 0133    Specimen:  Urine from Urine, Clean Catch Updated:  08/18/18 0203     Color, UA Yellow     Appearance, UA Clear     pH, UA 7.0     Specific Gravity, UA 1.015     Glucose, UA Negative     Ketones, UA Negative     Bilirubin, UA Negative     Blood, UA Negative     Protein, UA Negative     Leuk Esterase, UA Negative     Nitrite, UA Negative     Urobilinogen, UA 0.2 E.U./dL    Narrative:       Urine microscopic not indicated.    Comprehensive Metabolic Panel [[id number]]  (Abnormal) Collected:  08/18/18 0134    Specimen:  Blood Updated:  08/18/18 0202     Glucose 105 (H) mg/dL      BUN 17 mg/dL      Creatinine 1.02 (H) mg/dL      Sodium 140 mmol/L      Potassium 4.0 mmol/L      Chloride 102 mmol/L      CO2 24.2 mmol/L      Calcium 9.4 mg/dL      Total Protein 6.7 g/dL      Albumin 4.40 g/dL      ALT (SGPT) 44 (H) U/L      AST (SGOT) 95 (H) U/L      Alkaline Phosphatase 76 U/L      Total Bilirubin 0.3 mg/dL      eGFR Non African Amer 56 (L) mL/min/1.73      Globulin 2.3 gm/dL      A/G Ratio 1.9 g/dL      BUN/Creatinine Ratio 16.7     Anion Gap 13.8 mmol/L     CBC & Differential [[id number]] Collected:  08/18/18 0134    Specimen:  Blood Updated:  08/18/18 0142    Narrative:        The following orders were created for panel order CBC & Differential.  Procedure                               Abnormality         Status                     ---------                               -----------         ------                     CBC Auto Differential[[id number]]        Abnormal            Final result                 Please view results for these tests on the individual orders.    CBC Auto Differential [[id number]]  (Abnormal) Collected:  08/18/18 0134    Specimen:  Blood Updated:  08/18/18 0142     WBC 8.75 10*3/mm3      RBC 4.35 10*6/mm3      Hemoglobin 12.9 g/dL      Hematocrit 39.1 %      MCV 89.9 fL      MCH 29.7 pg      MCHC 33.0 g/dL      RDW 13.1 %      RDW-SD 42.8 fl      MPV 9.5 fL      Platelets 256 10*3/mm3      Neutrophil % 56.2 %      Lymphocyte % 32.5 %      Monocyte % 8.7 (H) %      Eosinophil % 1.8 %      Basophil % 0.6 %      Immature Grans % 0.2 %      Neutrophils, Absolute 4.92 10*3/mm3      Lymphocytes, Absolute 2.84 10*3/mm3      Monocytes, Absolute 0.76 10*3/mm3      Eosinophils, Absolute 0.16 10*3/mm3      Basophils, Absolute 0.05 10*3/mm3      Immature Grans, Absolute 0.02 10*3/mm3      nRBC 0.0 /100 WBC           Imaging Results (most recent)     Procedure Component Value Units Date/Time    US Abdomen Limited [[id number]] Updated:  08/18/18 1140        reviewed    ECG/EMG Results (most recent)     None        reviewed    Assessment/Plan     Panceratitis  LFTs  UTIs  History of colon polyps    Differential includes reformed CBD stone but her US shows non dilated ducts, as well as viral infection (CMV EBV) and drug induced (Bactrim/ Steroids)    Will recheck her CT and most likely an MRCP prior to considering a ERCP, Hold the Bactrim and consider Macrodantin for any future UTIs Will get Serologies too.    I discussed the patients findings and my recommendations with patient and Dr Fraga.     Wicho Chavez MD  08/18/18  4:11 PM    Time: Not Recorded

## 2018-08-18 NOTE — PLAN OF CARE
Problem: Patient Care Overview  Goal: Plan of Care Review   08/18/18 1733   Coping/Psychosocial   Plan of Care Reviewed With patient   OTHER   Outcome Summary po phenergan given for nausea with relief.ct scan of abd this evening declined iv dilaudid for abd discomfort .taking baclofen for back pain       Problem: Pancreatitis, Acute/Chronic (Adult)  Intervention: Monitor/Manage Pain   08/18/18 1630 08/18/18 1733   Safety Management   Medication Review/Management --  medications reviewed   Promote Oxygenation/Perfusion   Pain Management Interventions pain management plan reviewed with patient/caregiver --        Goal: Signs and Symptoms of Listed Potential Problems Will be Absent, Minimized or Managed (Pancreatitis, Acute/Chronic)   08/18/18 6420   Goal/Outcome Evaluation   Problems Assessed (Pancreatitis) nausea and vomiting;pain   Problems Present (Pancreatitis) nausea and vomiting;pain

## 2018-08-18 NOTE — ED PROVIDER NOTES
Subjective   History of Present Illness  History of Present Illness    Chief complaint: Abdominal pain    Location: Upper abdomen, radiates to back    Quality/Severity:  Severe, sharp pain    Timing/Onset/Duration: Acute onset after taking naproxen    Modifying Factors: Nothing seems to make it better or worse    Associated Symptoms: No headache.  No fever chills or cough.  No sore throat earache or nasal congestion.  No chest pain or shortness of breath.  No nausea or vomiting.  No diarrhea or burning when she urinates.  No change in color, size, and cystocele her stools.    Narrative: This 53-year-old white female with a history of gastric sleeve on December 10, 2013 that was done by a surgeon in [place], presents with upper abdominal pain.  The patient has been moving for the last 2 days.  She is experiencing low back pain.  She took 2 naproxen and experiencing upper abdominal pain.  She did have beans and rice tonight.  She had an EGD and colonoscopy 2 weeks ago by Dr. bhakta that the patient self reports as normal.  The patient has been having intermittent episodes, 2-3 times a week since November 2017.  The patient is also on Septra for chronic urinary tract infection.  Patient has had her gallbladder and appendix removed.    PCP: Moshe      Review of Systems   Constitutional: Positive for chills. Negative for fever.   HENT: Negative for ear pain and sore throat.    Eyes: Negative for discharge and redness.   Respiratory: Negative for cough, chest tightness and shortness of breath.    Cardiovascular: Negative for chest pain.   Gastrointestinal: Positive for abdominal pain. Negative for blood in stool, constipation, diarrhea, nausea and vomiting.   Genitourinary: Negative for difficulty urinating.   Musculoskeletal: Positive for back pain.   Skin: Negative for rash.   Neurological: Negative for headaches.   Hematological: Negative for adenopathy.   Psychiatric/Behavioral: Negative.  Negative for confusion.         Medication List      ASK your doctor about these medications    albuterol 108 (90 Base) MCG/ACT inhaler  Commonly known as:  PROVENTIL HFA;VENTOLIN HFA     ALPRAZolam XR 0.5 MG 24 hr tablet  Commonly known as:  XANAX XR  Take 1 tablet by mouth Daily.     azelastine 0.15 % solution nasal spray  Commonly known as:  ASTEPRO  2 sprays into each nostril 2 (Two) Times a Day.     buPROPion  MG 24 hr tablet  Commonly known as:  WELLBUTRIN XL     clobetasol 0.05 % ointment  Commonly known as:  TEMOVATE  Apply  topically 2 (Two) Times a Day.     DHEA 50 PO     estradiol 0.1 MG/GM vaginal cream  Commonly known as:  ESTRACE  Insert 1 g into the vagina 2 (Two) Times a Week.     levothyroxine 50 MCG tablet  Commonly known as:  SYNTHROID, LEVOTHROID     liothyronine 5 MCG tablet  Commonly known as:  CYTOMEL     losartan-hydrochlorothiazide 100-12.5 MG per tablet  Commonly known as:  HYZAAR  TAKE 1 TABLET DAILY     omeprazole 40 MG capsule  Commonly known as:  priLOSEC  Take 1 capsule by mouth 2 (Two) Times a Day.     predniSONE 10 MG tablet  Commonly known as:  DELTASONE  5 for 2 days, 4 for 2 days, 3 for 2 days, 2 for 2 days, 1 for 2 days     PROGESTERONE PO     valACYclovir 1000 MG tablet  Commonly known as:  VALTREX  Take 1 tablet by mouth 3 (Three) Times a Day.     venlafaxine XR 75 MG 24 hr capsule  Commonly known as:  EFFEXOR-XR     Vitamin D3 20840 units tablet          Past Medical History:   Diagnosis Date   • Colon polyps     c-scope was fall 2014. due for repeat in 2-3 years.   • Depression    • Gastroesophageal reflux disease 2/28/2017   • Headache     More problems when big fluctuations in weather   • Humerus fracture    • Hypercholesterolemia     High Triglycerides   • Hypertension    • Plantar fasciitis    • Sleep apnea        Allergies   Allergen Reactions   • Ceftin [Cefuroxime Axetil]    • Paroxetine    • Ciprofloxacin Rash   • Metronidazole Rash       Past Surgical History:   Procedure Laterality Date    • APPENDECTOMY     • CHOLECYSTECTOMY     • HYSTERECTOMY      Partial   • SINUS SURGERY     • STOMACH SURGERY  12/10/2013    Gastric sleve   • TONSILLECTOMY         Family History   Problem Relation Age of Onset   • Diabetes Mother    • Anxiety disorder Mother    • Depression Mother    • Diabetes Father    • Heart disease Father         CABG x 3 in early 60s   • Hypertension Father    • Anxiety disorder Sister    • Depression Sister    • Cancer Daughter         Synovial Cell Sarcoma       Social History     Social History   • Marital status:      Social History Main Topics   • Smoking status: Never Smoker   • Alcohol use 0.6 oz/week     1 Glasses of wine per week      Comment: 2 X PER MONTH    • Drug use: No   • Sexual activity: Yes     Partners: Male     Birth control/ protection: Post-menopausal     Other Topics Concern   • Not on file           Objective   Physical Exam   Constitutional: She is oriented to person, place, and time. She appears well-developed and well-nourished. No distress.   ED Triage Vitals (08/18/18 0113)  Temp: 98.4 °F (36.9 °C)  Heart Rate: 76  Resp: 22  BP: 144/91  SpO2: 99 %  Temp src: Oral  Heart Rate Source: Monitor  Patient Position: Lying  BP Location: Left arm  FiO2 (%): n/a    The patient's vitals were reviewed by me.  Unless otherwise noted they are within normal limits.     HENT:   Head: Normocephalic and atraumatic.   Right Ear: External ear normal.   Left Ear: External ear normal.   Nose: Nose normal.   Mouth/Throat: Oropharynx is clear and moist.   Eyes: Pupils are equal, round, and reactive to light. Conjunctivae and EOM are normal. Right eye exhibits no discharge. Left eye exhibits no discharge. No scleral icterus.   Neck: Normal range of motion. Neck supple. No JVD present. No tracheal deviation present. No thyromegaly present.   Cardiovascular: Normal rate, regular rhythm, normal heart sounds and intact distal pulses.  Exam reveals no gallop and no friction rub.    No  murmur heard.  Pulmonary/Chest: Effort normal and breath sounds normal. No stridor. No respiratory distress. She has no wheezes. She has no rales. She exhibits no tenderness.   Abdominal: Soft. Bowel sounds are normal. She exhibits no distension and no mass. There is tenderness (Moderate epigastric tenderness). There is no rebound and no guarding. No hernia.   Musculoskeletal: Normal range of motion. She exhibits no edema or deformity.   Lymphadenopathy:     She has no cervical adenopathy.   Neurological: She is alert and oriented to person, place, and time.   Skin: Skin is warm and dry. Capillary refill takes less than 2 seconds. No rash noted. She is not diaphoretic. No erythema. No pallor.   Psychiatric: Her behavior is normal.   Nursing note and vitals reviewed.      Procedures           ED Course  ED Course as of Aug 18 0232   Sat Aug 18, 2018   0224 The laboratory values were reviewed by me.  The lipase is 1110.  The glucose is 105.  The creatinines 1.02.  The ALT is 44.  The AST is 95.  The GFR 56.  The tour values are otherwise unremarkable.  [RC]      ED Course User Index  [RC] Jero Mckay MD        1:30 AM, 08/18/18:  The chart was reviewed by me.  July 16, 2018 the patient had an EGD that revealed esophagitis and gastritis.  The patient also had colon polyps that were biopsied.  April 2018 the patient had a CT of the abdomen and pelvis that was unremarkable.    2:35 AM, 08/18/18:  The patient was reassessed.  Her vital signs were reviewed and are stable.  Abdominal exam: Soft, moderate epigastric tenderness, no rebound, no guarding, no masses, positive bowel sounds.  The patient is requesting something more for pain.  We are giving her Dilaudid.    2:35 AM, 08/18/18:  The patient's diagnosis of acute pancreatitis was discussed with her.  She will be admitted for gut rest and IV hydration and pain management.  All the patient's questions were answered she will be admitted in stable condition.  All  the patient's questions were answered.    2:36 AM, 08/18/18:  Spoke with , on-call for the hospitalist, he will admit the patient.          [facility]    No orders to display     Labs Reviewed - No data to display  No results found.    Final diagnoses:   Acute pancreatitis, unspecified complication status, unspecified pancreatitis type         ED Medications:  Medications - No data to display    New Medications:     Medication List      ASK your doctor about these medications    albuterol 108 (90 Base) MCG/ACT inhaler  Commonly known as:  PROVENTIL HFA;VENTOLIN HFA     ALPRAZolam XR 0.5 MG 24 hr tablet  Commonly known as:  XANAX XR  Take 1 tablet by mouth Daily.     azelastine 0.15 % solution nasal spray  Commonly known as:  ASTEPRO  2 sprays into each nostril 2 (Two) Times a Day.     buPROPion  MG 24 hr tablet  Commonly known as:  WELLBUTRIN XL     clobetasol 0.05 % ointment  Commonly known as:  TEMOVATE  Apply  topically 2 (Two) Times a Day.     DHEA 50 PO     estradiol 0.1 MG/GM vaginal cream  Commonly known as:  ESTRACE  Insert 1 g into the vagina 2 (Two) Times a Week.     levothyroxine 50 MCG tablet  Commonly known as:  SYNTHROID, LEVOTHROID     liothyronine 5 MCG tablet  Commonly known as:  CYTOMEL     losartan-hydrochlorothiazide 100-12.5 MG per tablet  Commonly known as:  HYZAAR  TAKE 1 TABLET DAILY     omeprazole 40 MG capsule  Commonly known as:  priLOSEC  Take 1 capsule by mouth 2 (Two) Times a Day.     predniSONE 10 MG tablet  Commonly known as:  DELTASONE  5 for 2 days, 4 for 2 days, 3 for 2 days, 2 for 2 days, 1 for 2 days     PROGESTERONE PO     valACYclovir 1000 MG tablet  Commonly known as:  VALTREX  Take 1 tablet by mouth 3 (Three) Times a Day.     venlafaxine XR 75 MG 24 hr capsule  Commonly known as:  EFFEXOR-XR     Vitamin D3 66695 units tablet          Stopped Medications:     Medication List      ASK your doctor about these medications    albuterol 108 (90 Base) MCG/ACT  inhaler  Commonly known as:  PROVENTIL HFA;VENTOLIN HFA     ALPRAZolam XR 0.5 MG 24 hr tablet  Commonly known as:  XANAX XR  Take 1 tablet by mouth Daily.     azelastine 0.15 % solution nasal spray  Commonly known as:  ASTEPRO  2 sprays into each nostril 2 (Two) Times a Day.     buPROPion  MG 24 hr tablet  Commonly known as:  WELLBUTRIN XL     clobetasol 0.05 % ointment  Commonly known as:  TEMOVATE  Apply  topically 2 (Two) Times a Day.     DHEA 50 PO     estradiol 0.1 MG/GM vaginal cream  Commonly known as:  ESTRACE  Insert 1 g into the vagina 2 (Two) Times a Week.     levothyroxine 50 MCG tablet  Commonly known as:  SYNTHROID, LEVOTHROID     liothyronine 5 MCG tablet  Commonly known as:  CYTOMEL     losartan-hydrochlorothiazide 100-12.5 MG per tablet  Commonly known as:  HYZAAR  TAKE 1 TABLET DAILY     omeprazole 40 MG capsule  Commonly known as:  priLOSEC  Take 1 capsule by mouth 2 (Two) Times a Day.     predniSONE 10 MG tablet  Commonly known as:  DELTASONE  5 for 2 days, 4 for 2 days, 3 for 2 days, 2 for 2 days, 1 for 2 days     PROGESTERONE PO     valACYclovir 1000 MG tablet  Commonly known as:  VALTREX  Take 1 tablet by mouth 3 (Three) Times a Day.     venlafaxine XR 75 MG 24 hr capsule  Commonly known as:  EFFEXOR-XR     Vitamin D3 01400 units tablet            Final diagnoses:   Acute pancreatitis, unspecified complication status, unspecified pancreatitis type            Jero Mckay MD  08/18/18 6211

## 2018-08-18 NOTE — CONSULTS
Patient Care Team:  Meryl Mesa MD as PCP - General  Meryl Mesa MD as PCP - Family Medicine    CHIEF COMPLAINT: Pancreatitis    HISTORY OF PRESENT ILLNESS:    53 with sudden onset of Pain at Midnight last PM and has a history of gall stones and osbaldo n 1996  As well as Panceratitis 2-3 months later and several episodes consistant with pancratitis since that time but not this intense. LFTs elevated too and has recently started Cytomel thru 25 again and also has had several UTIs and has been treating with Bactrim. No previous history of Mono. No sig alcohol and no history of hepatitis nor sick contacts.    Has had very recent EGD colon which were unremarkable as  Well as a CT in April.      Past Medical History:   Diagnosis Date   • Colon polyps     c-scope was fall 2014. due for repeat in 2-3 years.   • Depression    • Gastroesophageal reflux disease 2/28/2017   • Headache     More problems when big fluctuations in weather   • History of frequent urinary tract infections    • Humerus fracture    • Hypercholesterolemia     High Triglycerides   • Hypertension    • Pancreatitis    • Plantar fasciitis    • Sleep apnea      Past Surgical History:   Procedure Laterality Date   • APPENDECTOMY     • CHOLECYSTECTOMY     • COLONOSCOPY     • HYSTERECTOMY      Partial   • SINUS SURGERY     • STOMACH SURGERY  12/10/2013    Gastric sleve   • TONSILLECTOMY       Family History   Problem Relation Age of Onset   • Diabetes Mother    • Anxiety disorder Mother    • Depression Mother    • Diabetes Father    • Heart disease Father         CABG x 3 in early 60s   • Hypertension Father    • Anxiety disorder Sister    • Depression Sister    • Cancer Daughter         Synovial Cell Sarcoma     Social History   Substance Use Topics   • Smoking status: Never Smoker   • Smokeless tobacco: Not on file   • Alcohol use 0.6 oz/week     1 Glasses of wine per week      Comment: 2 X PER MONTH      Facility-Administered  Medications Prior to Admission   Medication Dose Route Frequency Provider Last Rate Last Dose   • methylPREDNISolone acetate (DEPO-medrol) injection 80 mg  80 mg Intramuscular Once Kareem Tom MD         Prescriptions Prior to Admission   Medication Sig Dispense Refill Last Dose   • albuterol (PROVENTIL HFA;VENTOLIN HFA) 108 (90 BASE) MCG/ACT inhaler Ventolin  (90 Base) MCG/ACT Inhalation Aerosol Solution; Patient Sig: Ventolin  (90 Base) MCG/ACT Inhalation Aerosol Solution INHALE 2-4 PUFFS Q 4 HOURS PRN/COUGH/WHEEZE; 1; 0; 21-Aug-2015; Active   Taking   • ALPRAZolam XR (XANAX XR) 0.5 MG 24 hr tablet Take 1 tablet by mouth Daily. (Patient taking differently: Take 0.5 mg by mouth Daily As Needed.) 30 tablet 0 Past Month at Unknown time   • azelastine (ASTEPRO) 0.15 % solution nasal spray 2 sprays into each nostril 2 (Two) Times a Day. (Patient taking differently: 2 sprays into the nostril(s) as directed by provider 2 (Two) Times a Day As Needed.) 30 mL 1 Taking   • buPROPion XL (WELLBUTRIN XL) 150 MG 24 hr tablet Take 150 mg by mouth Daily.   Past Week at Unknown time   • Cholecalciferol (VITAMIN D3) 06690 units tablet chew one tablet by mouth every day WITH FOOD  1    • clobetasol (TEMOVATE) 0.05 % ointment Apply  topically 2 (Two) Times a Day. 60 g 1    • estradiol (ESTRACE) 0.1 MG/GM vaginal cream Insert 1 g into the vagina 2 (Two) Times a Week. 45 g 6 Past Month at Unknown time   • levothyroxine (SYNTHROID, LEVOTHROID) 50 MCG tablet take two  Tablets by mouth every morning on an empty stomach total 100mcg  1 8/17/2018 at Unknown time   • liothyronine (CYTOMEL) 5 MCG tablet take one Tablet by mouth every morning on an empty stomach  1 8/17/2018 at Unknown time   • losartan-hydrochlorothiazide (HYZAAR) 100-12.5 MG per tablet TAKE 1 TABLET DAILY 90 tablet 3 8/17/2018 at Unknown time   • omeprazole (priLOSEC) 40 MG capsule Take 1 capsule by mouth 2 (Two) Times a Day. 60 capsule 0 Past Week at  "Unknown time   • Prasterone, DHEA, (DHEA 50 PO) TAKE ONE Capsule BY MOUTH EVERY DAY before noon  1 8/17/2018 at Unknown time   • Progesterone Micronized (PROGESTERONE PO) TAKE ONE Capsule BY MOUTH EVERY NIGHT AT BEDTIME  1 8/17/2018 at Unknown time   • sulfamethoxazole-trimethoprim (BACTRIM DS,SEPTRA DS) 800-160 MG per tablet Take 1 tablet by mouth 2 (Two) Times a Day.      • venlafaxine XR (EFFEXOR-XR) 75 MG 24 hr capsule Take 75 mg by mouth Daily.   Past Week at Unknown time   • predniSONE (DELTASONE) 10 MG tablet 5 for 2 days, 4 for 2 days, 3 for 2 days, 2 for 2 days, 1 for 2 days 35 tablet 0    • valACYclovir (VALTREX) 1000 MG tablet Take 1 tablet by mouth 3 (Three) Times a Day. 21 tablet 0 Taking     Allergies:  Ceftin [cefuroxime axetil]; Paroxetine; Ciprofloxacin; and Metronidazole    REVIEW OF SYSTEMS:  Please see the above history of present illness for pertinent positives and negatives.  The remainder of the patient's systems have been reviewed and are negative.     Vital Signs  Temp:  [97.1 °F (36.2 °C)-98.4 °F (36.9 °C)] 98 °F (36.7 °C)  Heart Rate:  [65-77] 65  Resp:  [20-22] 20  BP: ()/(56-91) 98/67    Flowsheet Rows      First Filed Value   Admission Height  162 cm (63.78\") Documented at 08/18/2018 0113   Admission Weight  74.4 kg (164 lb) Documented at 08/18/2018 0113           Physical Exam:  Physical Exam   Constitutional: Patient appears well-developed and well-nourished and in no acute distress   HEENT:   Head: Normocephalic and atraumatic.   Eyes:  Pupils are equal, round, and reactive to light. EOM are intact. Sclera are anicteric and non-injected.  Mouth and Throat: Patient has moist mucous membranes. Oropharynx is clear of any erythema or exudate.     Neck: Neck supple. No JVD present. No thyromegaly present. No lymphadenopathy present.  Cardiovascular: Regular rate, regular rhythm, S1 normal and S2 normal.  Exam reveals no gallop and no friction rub.  No murmur heard.  Pulmonary/Chest: " Lungs are clear to auscultation bilaterally. No respiratory distress. No wheezes. No rhonchi. No rales.   Abdominal: Soft. Bowel sounds are normal. No distension and no mass. There is no hepatosplenomegaly. There is Moderate RLQ RUQ Epigastric tenderness.   Musculoskeletal: Normal Muscle tone  Extremities: No edema. Pulses are palpable in all 4 extremities.  Neurological: Patient is alert and oriented to person, place, and time. Cranial nerves II-XII are grossly intact with no focal deficits.  Skin: Skin is warm. No rash noted. Nails show no clubbing.  No cyanosis or erythema.     Results Review:    I reviewed the patient's new clinical results.  Lab Results (most recent)     Procedure Component Value Units Date/Time    Lipid Panel [[id number]] Collected:  08/18/18 1105    Specimen:  Blood Updated:  08/18/18 1126     Total Cholesterol 129 mg/dL      Triglycerides 70 mg/dL      HDL Cholesterol 55 mg/dL      LDL Cholesterol  60 mg/dL      VLDL Cholesterol 14 mg/dL      LDL/HDL Ratio 1.09    Hemoglobin A1c [[id number]]  (Abnormal) Collected:  08/18/18 1105    Specimen:  Blood Updated:  08/18/18 1125     Hemoglobin A1C 5.80 (H) %     Narrative:       Hemoglobin A1C Ranges:    Increased Risk for Diabetes  5.7% to 6.4%  Diabetes                     >= 6.5%  Diabetic Goal                < 7.0%    Hepatitis Panel, Acute [[id number]] Collected:  08/18/18 1105    Specimen:  Blood Updated:  08/18/18 1107    Lipase [[id number]]  (Abnormal) Collected:  08/18/18 0609    Specimen:  Blood Updated:  08/18/18 0702     Lipase 1,042 (H) U/L     Comprehensive Metabolic Panel [[id number]]  (Abnormal) Collected:  08/18/18 0609    Specimen:  Blood Updated:  08/18/18 0702     Glucose 115 (H) mg/dL      BUN 14 mg/dL      Creatinine 0.88 mg/dL      Sodium 140 mmol/L      Potassium 4.1 mmol/L      Chloride 107 mmol/L      CO2 24.2 mmol/L      Calcium 8.1 (L) mg/dL      Total Protein 5.4 (L) g/dL      Albumin 3.60 g/dL      ALT (SGPT) 335 (H) U/L       AST (SGOT) 662 (H) U/L      Alkaline Phosphatase 81 U/L      Total Bilirubin 0.5 mg/dL      eGFR Non African Amer 67 mL/min/1.73      Globulin 1.8 gm/dL      A/G Ratio 2.0 g/dL      BUN/Creatinine Ratio 15.9     Anion Gap 8.8 mmol/L     Amylase [[id number]]  (Abnormal) Collected:  08/18/18 0609    Specimen:  Blood Updated:  08/18/18 0658     Amylase 641 (H) U/L     Lactic Acid, Plasma [[id number]]  (Normal) Collected:  08/18/18 0609    Specimen:  Blood Updated:  08/18/18 0652     Lactate 1.3 mmol/L     Lipase [[id number]]  (Abnormal) Collected:  08/18/18 0134    Specimen:  Blood Updated:  08/18/18 0204     Lipase 1,110 (H) U/L     Urinalysis With Microscopic If Indicated (No Culture) - Urine, Clean Catch [[id number]]  (Normal) Collected:  08/18/18 0133    Specimen:  Urine from Urine, Clean Catch Updated:  08/18/18 0203     Color, UA Yellow     Appearance, UA Clear     pH, UA 7.0     Specific Gravity, UA 1.015     Glucose, UA Negative     Ketones, UA Negative     Bilirubin, UA Negative     Blood, UA Negative     Protein, UA Negative     Leuk Esterase, UA Negative     Nitrite, UA Negative     Urobilinogen, UA 0.2 E.U./dL    Narrative:       Urine microscopic not indicated.    Comprehensive Metabolic Panel [[id number]]  (Abnormal) Collected:  08/18/18 0134    Specimen:  Blood Updated:  08/18/18 0202     Glucose 105 (H) mg/dL      BUN 17 mg/dL      Creatinine 1.02 (H) mg/dL      Sodium 140 mmol/L      Potassium 4.0 mmol/L      Chloride 102 mmol/L      CO2 24.2 mmol/L      Calcium 9.4 mg/dL      Total Protein 6.7 g/dL      Albumin 4.40 g/dL      ALT (SGPT) 44 (H) U/L      AST (SGOT) 95 (H) U/L      Alkaline Phosphatase 76 U/L      Total Bilirubin 0.3 mg/dL      eGFR Non African Amer 56 (L) mL/min/1.73      Globulin 2.3 gm/dL      A/G Ratio 1.9 g/dL      BUN/Creatinine Ratio 16.7     Anion Gap 13.8 mmol/L     CBC & Differential [[id number]] Collected:  08/18/18 0134    Specimen:  Blood Updated:  08/18/18 0142    Narrative:        The following orders were created for panel order CBC & Differential.  Procedure                               Abnormality         Status                     ---------                               -----------         ------                     CBC Auto Differential[[id number]]        Abnormal            Final result                 Please view results for these tests on the individual orders.    CBC Auto Differential [[id number]]  (Abnormal) Collected:  08/18/18 0134    Specimen:  Blood Updated:  08/18/18 0142     WBC 8.75 10*3/mm3      RBC 4.35 10*6/mm3      Hemoglobin 12.9 g/dL      Hematocrit 39.1 %      MCV 89.9 fL      MCH 29.7 pg      MCHC 33.0 g/dL      RDW 13.1 %      RDW-SD 42.8 fl      MPV 9.5 fL      Platelets 256 10*3/mm3      Neutrophil % 56.2 %      Lymphocyte % 32.5 %      Monocyte % 8.7 (H) %      Eosinophil % 1.8 %      Basophil % 0.6 %      Immature Grans % 0.2 %      Neutrophils, Absolute 4.92 10*3/mm3      Lymphocytes, Absolute 2.84 10*3/mm3      Monocytes, Absolute 0.76 10*3/mm3      Eosinophils, Absolute 0.16 10*3/mm3      Basophils, Absolute 0.05 10*3/mm3      Immature Grans, Absolute 0.02 10*3/mm3      nRBC 0.0 /100 WBC           Imaging Results (most recent)     Procedure Component Value Units Date/Time    US Abdomen Limited [[id number]] Updated:  08/18/18 1140        reviewed    ECG/EMG Results (most recent)     None        reviewed    Assessment/Plan     Panceratitis  LFTs  UTIs  History of colon polyps    Differential includes reformed CBD stone but her US shows non dilated ducts, as well as viral infection (CMV EBV) and drug induced (Bactrim/ Steroids)    Will recheck her CT and most likely an MRCP prior to considering a ERCP, Hold the Bactrim and consider Macrodantin for any future UTIs Will get Serologies too.    I discussed the patients findings and my recommendations with patient and Dr Fraga.     Wicho Chavez MD  08/18/18  4:11 PM    Time: Not Recorded

## 2018-08-18 NOTE — H&P
[facility] MEDICAL GROUP HOSPITALIST     Meryl Mesa MD    CHIEF COMPLAINT: Abdominal Pain    HISTORY OF PRESENT ILLNESS:    54 yo WF w/ PMH of HTN, DEDRICK on CPAP, chronic UTI, Gastric Sleeve (done in [place]) and EGD & Colonoscopy 2 wks ago, Cholecystecomy in 1996, who p/w severe RUQ pain that started about just before midnight on 8/17 (yesterday). Associated with nausea and anorexia. Improved with fluids and 'not pushing on it.' Currently a 1/10.    She has been having intermittent RUQ severe colicky pain for about 10 years, is always at night about 1-2 hours after she eats, but does not associate pain with a specific food type. Ate beans and rice last night, and the pain came on suddenly last night about 2 hours later.     Also has a complaint of low back pain with b/l radiation down backs of thighs to mid thigh. Some buckling of knees, but no weakness, no bowel or bladder incontinence. Associated with increased activity of packing and moving.      Past Medical History:   Diagnosis Date   • Colon polyps     c-scope was fall 2014. due for repeat in 2-3 years.   • Depression    • Gastroesophageal reflux disease 2/28/2017   • Headache     More problems when big fluctuations in weather   • History of frequent urinary tract infections    • Humerus fracture    • Hypercholesterolemia     High Triglycerides   • Hypertension    • Pancreatitis    • Plantar fasciitis    • Sleep apnea      Past Surgical History:   Procedure Laterality Date   • APPENDECTOMY     • CHOLECYSTECTOMY     • COLONOSCOPY     • HYSTERECTOMY      Partial   • SINUS SURGERY     • STOMACH SURGERY  12/10/2013    Gastric sleve   • TONSILLECTOMY       Family History   Problem Relation Age of Onset   • Diabetes Mother    • Anxiety disorder Mother    • Depression Mother    • Diabetes Father    • Heart disease Father         CABG x 3 in early 60s   • Hypertension Father    • Anxiety disorder Sister    • Depression Sister    • Cancer Daughter          Synovial Cell Sarcoma     Social History   Substance Use Topics   • Smoking status: Never Smoker   • Smokeless tobacco: Not on file   • Alcohol use 0.6 oz/week     1 Glasses of wine per week      Comment: 2 X PER MONTH      Facility-Administered Medications Prior to Admission   Medication Dose Route Frequency Provider Last Rate Last Dose   • methylPREDNISolone acetate (DEPO-medrol) injection 80 mg  80 mg Intramuscular Once Kareem Tom MD         Prescriptions Prior to Admission   Medication Sig Dispense Refill Last Dose   • albuterol (PROVENTIL HFA;VENTOLIN HFA) 108 (90 BASE) MCG/ACT inhaler Ventolin  (90 Base) MCG/ACT Inhalation Aerosol Solution; Patient Sig: Ventolin  (90 Base) MCG/ACT Inhalation Aerosol Solution INHALE 2-4 PUFFS Q 4 HOURS PRN/COUGH/WHEEZE; 1; 0; 21-Aug-2015; Active   Taking   • ALPRAZolam XR (XANAX XR) 0.5 MG 24 hr tablet Take 1 tablet by mouth Daily. (Patient taking differently: Take 0.5 mg by mouth Daily As Needed.) 30 tablet 0 Past Month at Unknown time   • azelastine (ASTEPRO) 0.15 % solution nasal spray 2 sprays into each nostril 2 (Two) Times a Day. (Patient taking differently: 2 sprays into the nostril(s) as directed by provider 2 (Two) Times a Day As Needed.) 30 mL 1 Taking   • buPROPion XL (WELLBUTRIN XL) 150 MG 24 hr tablet Take 150 mg by mouth Daily.   Past Week at Unknown time   • Cholecalciferol (VITAMIN D3) 85636 units tablet chew one tablet by mouth every day WITH FOOD  1    • clobetasol (TEMOVATE) 0.05 % ointment Apply  topically 2 (Two) Times a Day. 60 g 1    • estradiol (ESTRACE) 0.1 MG/GM vaginal cream Insert 1 g into the vagina 2 (Two) Times a Week. 45 g 6 Past Month at Unknown time   • levothyroxine (SYNTHROID, LEVOTHROID) 50 MCG tablet take two  Tablets by mouth every morning on an empty stomach total 100mcg  1 8/17/2018 at Unknown time   • liothyronine (CYTOMEL) 5 MCG tablet take one Tablet by mouth every morning on an empty stomach  1 8/17/2018 at  "Unknown time   • losartan-hydrochlorothiazide (HYZAAR) 100-12.5 MG per tablet TAKE 1 TABLET DAILY 90 tablet 3 8/17/2018 at Unknown time   • omeprazole (priLOSEC) 40 MG capsule Take 1 capsule by mouth 2 (Two) Times a Day. 60 capsule 0 Past Week at Unknown time   • Prasterone, DHEA, (DHEA 50 PO) TAKE ONE Capsule BY MOUTH EVERY DAY before noon  1 8/17/2018 at Unknown time   • Progesterone Micronized (PROGESTERONE PO) TAKE ONE Capsule BY MOUTH EVERY NIGHT AT BEDTIME  1 8/17/2018 at Unknown time   • sulfamethoxazole-trimethoprim (BACTRIM DS,SEPTRA DS) 800-160 MG per tablet Take 1 tablet by mouth 2 (Two) Times a Day.      • venlafaxine XR (EFFEXOR-XR) 75 MG 24 hr capsule Take 75 mg by mouth Daily.   Past Week at Unknown time   • predniSONE (DELTASONE) 10 MG tablet 5 for 2 days, 4 for 2 days, 3 for 2 days, 2 for 2 days, 1 for 2 days 35 tablet 0    • valACYclovir (VALTREX) 1000 MG tablet Take 1 tablet by mouth 3 (Three) Times a Day. 21 tablet 0 Taking     Allergies:  Ceftin [cefuroxime axetil]; Paroxetine; Ciprofloxacin; and Metronidazole    REVIEW OF SYSTEMS:  Please see the above history of present illness for pertinent positives and negatives.  The remainder of the patient's systems have been reviewed and are negative.     Vital Signs  Temp:  [97.1 °F (36.2 °C)-98.4 °F (36.9 °C)] 97.4 °F (36.3 °C)  Heart Rate:  [72-77] 73  Resp:  [20-22] 20  BP: (103-144)/(56-91) 103/56    Flowsheet Rows      First Filed Value   Admission Height  162 cm (63.78\") Documented at 08/18/2018 0113   Admission Weight  74.4 kg (164 lb) Documented at 08/18/2018 0113           Physical Exam:  Physical Exam   Constitutional: She is oriented to person, place, and time. She appears well-developed and well-nourished. No distress.   HENT:   Head: Normocephalic and atraumatic.   Right Ear: External ear normal.   Left Ear: External ear normal.   Nose: Nose normal.   Mouth/Throat: Oropharynx is clear and moist. No oropharyngeal exudate.   Eyes: Pupils are " equal, round, and reactive to light. Conjunctivae and EOM are normal. No scleral icterus.   Neck: Normal range of motion. Neck supple. No JVD present. No tracheal deviation present.   Cardiovascular: Normal rate, regular rhythm, normal heart sounds and intact distal pulses.    Pulmonary/Chest: Effort normal and breath sounds normal. No stridor. No respiratory distress. She has no wheezes. She has no rales.   Abdominal: Soft. She exhibits no distension. There is tenderness. There is guarding.   Hypoactive bs  Not an acute surgical abdomen  Pain in the ruq over 'gallbladder' area   Musculoskeletal: She exhibits tenderness. She exhibits no edema.   No spinous process tenderness, b/l paraspinal tenderness w/ muscle twitching  Neg SLR b/l  Neg Patricks b/l   Neurological: She is alert and oriented to person, place, and time. No cranial nerve deficit or sensory deficit. She exhibits normal muscle tone. Coordination normal.   Skin: Skin is warm and dry. She is not diaphoretic. No erythema.   Psychiatric: She has a normal mood and affect. Her behavior is normal.   Nursing note and vitals reviewed.       Results Review:    I reviewed the patient's new clinical results.  Lab Results (most recent)     Procedure Component Value Units Date/Time    Lipase [[id number]]  (Abnormal) Collected:  08/18/18 0609    Specimen:  Blood Updated:  08/18/18 0702     Lipase 1,042 (H) U/L     Comprehensive Metabolic Panel [[id number]]  (Abnormal) Collected:  08/18/18 0609    Specimen:  Blood Updated:  08/18/18 0702     Glucose 115 (H) mg/dL      BUN 14 mg/dL      Creatinine 0.88 mg/dL      Sodium 140 mmol/L      Potassium 4.1 mmol/L      Chloride 107 mmol/L      CO2 24.2 mmol/L      Calcium 8.1 (L) mg/dL      Total Protein 5.4 (L) g/dL      Albumin 3.60 g/dL      ALT (SGPT) 335 (H) U/L      AST (SGOT) 662 (H) U/L      Alkaline Phosphatase 81 U/L      Total Bilirubin 0.5 mg/dL      eGFR Non African Amer 67 mL/min/1.73      Globulin 1.8 gm/dL       A/G Ratio 2.0 g/dL      BUN/Creatinine Ratio 15.9     Anion Gap 8.8 mmol/L     Amylase [[id number]]  (Abnormal) Collected:  08/18/18 0609    Specimen:  Blood Updated:  08/18/18 0658     Amylase 641 (H) U/L     Lactic Acid, Plasma [[id number]]  (Normal) Collected:  08/18/18 0609    Specimen:  Blood Updated:  08/18/18 0652     Lactate 1.3 mmol/L     Lipase [[id number]]  (Abnormal) Collected:  08/18/18 0134    Specimen:  Blood Updated:  08/18/18 0204     Lipase 1,110 (H) U/L     Urinalysis With Microscopic If Indicated (No Culture) - Urine, Clean Catch [[id number]]  (Normal) Collected:  08/18/18 0133    Specimen:  Urine from Urine, Clean Catch Updated:  08/18/18 0203     Color, UA Yellow     Appearance, UA Clear     pH, UA 7.0     Specific Gravity, UA 1.015     Glucose, UA Negative     Ketones, UA Negative     Bilirubin, UA Negative     Blood, UA Negative     Protein, UA Negative     Leuk Esterase, UA Negative     Nitrite, UA Negative     Urobilinogen, UA 0.2 E.U./dL    Narrative:       Urine microscopic not indicated.    Comprehensive Metabolic Panel [[id number]]  (Abnormal) Collected:  08/18/18 0134    Specimen:  Blood Updated:  08/18/18 0202     Glucose 105 (H) mg/dL      BUN 17 mg/dL      Creatinine 1.02 (H) mg/dL      Sodium 140 mmol/L      Potassium 4.0 mmol/L      Chloride 102 mmol/L      CO2 24.2 mmol/L      Calcium 9.4 mg/dL      Total Protein 6.7 g/dL      Albumin 4.40 g/dL      ALT (SGPT) 44 (H) U/L      AST (SGOT) 95 (H) U/L      Alkaline Phosphatase 76 U/L      Total Bilirubin 0.3 mg/dL      eGFR Non African Amer 56 (L) mL/min/1.73      Globulin 2.3 gm/dL      A/G Ratio 1.9 g/dL      BUN/Creatinine Ratio 16.7     Anion Gap 13.8 mmol/L     CBC & Differential [[id number]] Collected:  08/18/18 0134    Specimen:  Blood Updated:  08/18/18 0142    Narrative:       The following orders were created for panel order CBC & Differential.  Procedure                               Abnormality         Status                      ---------                               -----------         ------                     CBC Auto Differential[[id number]]        Abnormal            Final result                 Please view results for these tests on the individual orders.    CBC Auto Differential [[id number]]  (Abnormal) Collected:  08/18/18 0134    Specimen:  Blood Updated:  08/18/18 0142     WBC 8.75 10*3/mm3      RBC 4.35 10*6/mm3      Hemoglobin 12.9 g/dL      Hematocrit 39.1 %      MCV 89.9 fL      MCH 29.7 pg      MCHC 33.0 g/dL      RDW 13.1 %      RDW-SD 42.8 fl      MPV 9.5 fL      Platelets 256 10*3/mm3      Neutrophil % 56.2 %      Lymphocyte % 32.5 %      Monocyte % 8.7 (H) %      Eosinophil % 1.8 %      Basophil % 0.6 %      Immature Grans % 0.2 %      Neutrophils, Absolute 4.92 10*3/mm3      Lymphocytes, Absolute 2.84 10*3/mm3      Monocytes, Absolute 0.76 10*3/mm3      Eosinophils, Absolute 0.16 10*3/mm3      Basophils, Absolute 0.05 10*3/mm3      Immature Grans, Absolute 0.02 10*3/mm3      nRBC 0.0 /100 WBC           Imaging Results (most recent)     None        reviewed    ECG/EMG Results (most recent)     None        reviewed    Assessment/Plan     Pancreatitis of uncertain etiology  - Check lipid panel and triglycerides  - Denies heavy alcohol use  - s/p gastric sleeve and cholecystectomy  - Lipase >1,000  - AST 44 -> 335, ALT 95 -> 66  - U/S Abdomen pending  - MRCP  - GI consult  - Continue fluids, NPO, pain control w/ IV dialudid  - Blood pressures soft, but vitals otherwise stable, ms and clinical condition good  - Increased fluids from 100 -> 150      Elevated LFT's  - Acute Hep Panel to r/o acute hep A, in context of current local outbreak      HTN   - On home hyzaar    Low back Strain  - No alarm symptoms on exam  - No imaging needed at this time  - PRN baclofen  - Heating pad, and other supportive measures, activity as tolerated  - Acetaminophen for back pain    I discussed the patients findings and my recommendations with  patient.     Evelio Fraga MD  08/18/18  10:00 AM

## 2018-08-18 NOTE — PLAN OF CARE
Problem: Patient Care Overview  Goal: Discharge Needs Assessment  Outcome: Ongoing (interventions implemented as appropriate)   08/18/18 0314 08/18/18 1457   Disability   Equipment Currently Used at Home none --    Discharge Needs Assessment   Readmission Within the Last 30 Days --  no previous admission in last 30 days   Concerns to be Addressed --  denies needs/concerns at this time   Patient/Family Anticipates Transition to --  home with family   Patient/Family Anticipated Services at Transition --  none   Transportation Concerns --  car, none   Transportation Anticipated --  car, drives self

## 2018-08-18 NOTE — NURSING NOTE
Discharge Planning Assessment   Zunilda Church     Patient Name: Neville Rodriges  MRN: [id number]  Today's Date: 8/18/2018    Admit Date: 8/18/2018          Discharge Needs Assessment     Row Name 08/18/18 1457       Living Environment    Lives With spouse    Current Living Arrangements home/apartment/condo    Primary Care Provided by self    Provides Primary Care For no one    Family Caregiver if Needed spouse;child(ruth), adult    Quality of Family Relationships involved;supportive    Able to Return to Prior Arrangements yes       Resource/Environmental Concerns    Resource/Environmental Concerns none    Transportation Concerns car, none       Transition Planning    Patient/Family Anticipates Transition to home with family    Patient/Family Anticipated Services at Transition none    Transportation Anticipated car, drives self       Discharge Needs Assessment    Readmission Within the Last 30 Days no previous admission in last 30 days    Concerns to be Addressed denies needs/concerns at this time            Discharge Plan     Row Name 08/18/18 1458       Plan    Plan plan home, no needs    Patient/Family in Agreement with Plan yes    Plan Comments Spoke with patient at bedside. Face sheet verified. Patient is independent of ADLs including driving and working prior to admission. She denies use of DME, home 02, cpap/bipap. She uses [facility] [place] and denies issues obtaining medications.  She does not anticipate any needs at [place]. CM # placed on white board, will continue to follow.        Destination     No service coordination in this encounter.      Durable Medical Equipment     No service coordination in this encounter.      Dialysis/Infusion     No service coordination in this encounter.      Home Medical Care     No service coordination in this encounter.      Social Care     No service coordination in this encounter.                Demographic Summary     Row Name 08/18/18 1456       General Information     Admission Type inpatient    Arrived From home    Referral Source admission list    Reason for Consult discharge planning    Preferred Language English     Used During This Interaction no       Contact Information    Permission Granted to Share Info With             Functional Status    No documentation.           Psychosocial    No documentation.           Abuse/Neglect    No documentation.           Legal    No documentation.           Substance Abuse    No documentation.           Patient Forms    No documentation.         Jose Menezes RN

## 2018-08-19 LAB
ALBUMIN SERPL-MCNC: 3.3 G/DL (ref 3.5–5.2)
ALBUMIN/GLOB SERPL: 1.5 G/DL
ALP SERPL-CCNC: 105 U/L (ref 40–129)
ALT SERPL W P-5'-P-CCNC: 468 U/L (ref 5–33)
ANION GAP SERPL CALCULATED.3IONS-SCNC: 11.9 MMOL/L
AST SERPL-CCNC: 366 U/L (ref 5–32)
BASOPHILS # BLD AUTO: 0.04 10*3/MM3 (ref 0–0.2)
BASOPHILS NFR BLD AUTO: 1 % (ref 0–2)
BILIRUB SERPL-MCNC: 0.9 MG/DL (ref 0.2–1.2)
BUN BLD-MCNC: 8 MG/DL (ref 6–20)
BUN/CREAT SERPL: 11.4 (ref 7–25)
CALCIUM SPEC-SCNC: 8.3 MG/DL (ref 8.6–10.5)
CHLORIDE SERPL-SCNC: 107 MMOL/L (ref 98–107)
CO2 SERPL-SCNC: 24.1 MMOL/L (ref 22–29)
CREAT BLD-MCNC: 0.7 MG/DL (ref 0.57–1)
DEPRECATED RDW RBC AUTO: 42.9 FL (ref 37–54)
EOSINOPHIL # BLD AUTO: 0.13 10*3/MM3 (ref 0.1–0.3)
EOSINOPHIL NFR BLD AUTO: 3.3 % (ref 0–4)
ERYTHROCYTE [DISTWIDTH] IN BLOOD BY AUTOMATED COUNT: 13.1 % (ref 11.5–14.5)
GFR SERPL CREATININE-BSD FRML MDRD: 87 ML/MIN/1.73
GLOBULIN UR ELPH-MCNC: 2.2 GM/DL
GLUCOSE BLD-MCNC: 90 MG/DL (ref 65–99)
HAV IGM SERPL QL IA: NORMAL
HBV CORE IGM SERPL QL IA: NORMAL
HBV SURFACE AG SERPL QL IA: NORMAL
HCT VFR BLD AUTO: 34.6 % (ref 37–47)
HCV AB SER DONR QL: NORMAL
HGB BLD-MCNC: 11.3 G/DL (ref 12–16)
IMM GRANULOCYTES # BLD: 0.01 10*3/MM3 (ref 0–0.03)
IMM GRANULOCYTES NFR BLD: 0.3 % (ref 0–0.5)
LYMPHOCYTES # BLD AUTO: 1.27 10*3/MM3 (ref 0.6–4.8)
LYMPHOCYTES NFR BLD AUTO: 32.2 % (ref 20–45)
MCH RBC QN AUTO: 29.7 PG (ref 27–31)
MCHC RBC AUTO-ENTMCNC: 32.7 G/DL (ref 31–37)
MCV RBC AUTO: 90.8 FL (ref 81–99)
MONOCYTES # BLD AUTO: 0.35 10*3/MM3 (ref 0–1)
MONOCYTES NFR BLD AUTO: 8.9 % (ref 3–8)
NEUTROPHILS # BLD AUTO: 2.14 10*3/MM3 (ref 1.5–8.3)
NEUTROPHILS NFR BLD AUTO: 54.3 % (ref 45–70)
NRBC BLD MANUAL-RTO: 0 /100 WBC (ref 0–0)
PLATELET # BLD AUTO: 178 10*3/MM3 (ref 140–500)
PMV BLD AUTO: 9.7 FL (ref 7.4–10.4)
POTASSIUM BLD-SCNC: 3.9 MMOL/L (ref 3.5–5.2)
PROT SERPL-MCNC: 5.5 G/DL (ref 6–8.5)
RBC # BLD AUTO: 3.81 10*6/MM3 (ref 4.2–5.4)
SODIUM BLD-SCNC: 143 MMOL/L (ref 136–145)
WBC NRBC COR # BLD: 3.94 10*3/MM3 (ref 4.8–10.8)

## 2018-08-19 PROCEDURE — 94799 UNLISTED PULMONARY SVC/PX: CPT

## 2018-08-19 PROCEDURE — 99232 SBSQ HOSP IP/OBS MODERATE 35: CPT | Performed by: INTERNAL MEDICINE

## 2018-08-19 PROCEDURE — 25010000002 ENOXAPARIN PER 10 MG: Performed by: FAMILY MEDICINE

## 2018-08-19 PROCEDURE — 80053 COMPREHEN METABOLIC PANEL: CPT | Performed by: INTERNAL MEDICINE

## 2018-08-19 PROCEDURE — 85025 COMPLETE CBC W/AUTO DIFF WBC: CPT | Performed by: INTERNAL MEDICINE

## 2018-08-19 RX ADMIN — HYDROCHLOROTHIAZIDE: 25 TABLET ORAL at 09:05

## 2018-08-19 RX ADMIN — LEVOTHYROXINE SODIUM 100 MCG: 100 TABLET ORAL at 06:38

## 2018-08-19 RX ADMIN — ENOXAPARIN SODIUM 40 MG: 40 INJECTION SUBCUTANEOUS at 09:06

## 2018-08-19 RX ADMIN — VENLAFAXINE HYDROCHLORIDE 75 MG: 37.5 CAPSULE, EXTENDED RELEASE ORAL at 09:05

## 2018-08-19 RX ADMIN — PANTOPRAZOLE SODIUM 40 MG: 40 TABLET, DELAYED RELEASE ORAL at 06:38

## 2018-08-19 RX ADMIN — BUPROPION HYDROCHLORIDE 150 MG: 150 TABLET, FILM COATED, EXTENDED RELEASE ORAL at 09:05

## 2018-08-19 RX ADMIN — SODIUM CHLORIDE 150 ML/HR: 9 INJECTION, SOLUTION INTRAVENOUS at 03:15

## 2018-08-19 NOTE — PROGRESS NOTES
GI Daily Progress Note    Assessment/Plan:    Active Problems:    Acute pancreatitis       LOS: 1 day     Neville Rodriges is a 53 y.o. female who was admitted with Pancreatitis. She reports his symptoms are improving with treatment.  but up and about room and hungry. Reviewed her CT and Labs awaiting remainder of her antibodies    Subjective:    Patient expresses abdominal pain  Patient denies vomiting, diarrhea and loss of appetite    Objective:    Vital signs in last 24 hours:  Temp:  [97.3 °F (36.3 °C)-98.3 °F (36.8 °C)] 98.3 °F (36.8 °C)  Heart Rate:  [57-77] 75  Resp:  [16-18] 18  BP: (111-143)/(64-92) 135/77    Intake/Output last 3 shifts:  I/O last 3 completed shifts:  In: 865 [I.V.:865]  Out: 4750 [Urine:4750]  Intake/Output this shift:  No intake/output data recorded.    Physical Exam:Abdomen  Sounds Normal Active Bowel Sounds   Distension Soft   Tenderness Mildly Tender     Imaging Results (last 72 hours)     Procedure Component Value Units Date/Time    CT Abdomen Pelvis With Contrast [[id number]] Collected:  08/19/18 0825     Updated:  08/19/18 0830    Narrative:       CT ABDOMEN PELVIS WITH ORAL AND IV CONTRAST 8/18/2018     HISTORY: Epigastric pain began this morning. HISTORY of pancreatitis.     TECHNIQUE: Axial CT abdomen pelvis with oral and IV contrast and  multiplanar reformats Radiation dose reduction techniques were utilized,  including automated exposure control and exposure modulation based on  body size. 1 CT and / or nuclear cardiology exams in the past 12 months  according to records at this institution.     COMPARISON: CT abdomen pelvis 4/22/2018     LUNG BASES:Unremarkable     ABDOMEN FINDINGS: Is been cholecystectomy. There is mild periportal  edema. There is hazy fat infiltration near the pancreatic head and  uncinate process, suggesting possible pancreatitis. There is no abnormal  fluid collection. The portal and splenic veins are normally patent     The distal pancreas and  spleen are normal. The kidneys and adrenal  glands show no acute abnormality. The aorta is normal in caliber and  there is no bowel obstruction.     PELVIS FINDINGS: There is no pelvic mass or inflammatory change or  abnormal collection. There is no hernia or bowel obstruction. The bony  structures are unremarkable.       Impression:       Mild hazy fat infiltration around the proximal pancreas  without evidence of abnormal fluid collection. Mild hepatic periportal  edema without convincing evidence of biliary obstruction. Question acute  pancreatitis.     This report was finalized on 8/19/2018 8:28 AM by Dr. Javad Grullon MD.       US Abdomen Limited [[id number]] Collected:  08/19/18 0654     Updated:  08/19/18 0658    Narrative:       INDICATION: Prior cholecystectomy, 1 day history of abdominal pain     EXAM: Abdominal ultrasound, limited to the right upper quadrant.     COMPARISON: CT abdomen pelvis dated 4/22/2018     FINDINGS:  Visualized portions of the pancreas are within normal limits.      The echogenicity and echotexture of the hepatic parenchyma is within  normal limits. No hepatic mass. There is minimal prominence of the  intrahepatic biliary tree, unchanged since the CT examination of April 20, 2018. The common duct is normal in size at the nick hepatis.      The gallbladder is surgically absent.     The right kidney is normal in size. Renal cortical thickness and  echogenicity is normal. No hydronephrosis. Small simple renal cyst     No ascites.       Impression:       . Mild prominence of the biliary tree, likely related to prior  cholecystectomy, and unchanged since CT examination of April 20, 2018.     Otherwise Negative right upper quadrant ultrasound.     This report was finalized on 8/19/2018 6:56 AM by Dr. Javad Grullon MD.             WBC   Date Value Ref Range Status   08/19/2018 3.94 (L) 4.80 - 10.80 10*3/mm3 Final     RBC   Date Value Ref Range Status   08/19/2018 3.81 (L) 4.20 -  5.40 10*6/mm3 Final     Hemoglobin   Date Value Ref Range Status   08/19/2018 11.3 (L) 12.0 - 16.0 g/dL Final     Hematocrit   Date Value Ref Range Status   08/19/2018 34.6 (L) 37.0 - 47.0 % Final     MCV   Date Value Ref Range Status   08/19/2018 90.8 81.0 - 99.0 fL Final     MCH   Date Value Ref Range Status   08/19/2018 29.7 27.0 - 31.0 pg Final     MCHC   Date Value Ref Range Status   08/19/2018 32.7 31.0 - 37.0 g/dL Final     RDW   Date Value Ref Range Status   08/19/2018 13.1 11.5 - 14.5 % Final     RDW-SD   Date Value Ref Range Status   08/19/2018 42.9 37.0 - 54.0 fl Final     MPV   Date Value Ref Range Status   08/19/2018 9.7 7.4 - 10.4 fL Final     Platelets   Date Value Ref Range Status   08/19/2018 178 140 - 500 10*3/mm3 Final     Neutrophil %   Date Value Ref Range Status   08/19/2018 54.3 45.0 - 70.0 % Final     Lymphocyte %   Date Value Ref Range Status   08/19/2018 32.2 20.0 - 45.0 % Final     Monocyte %   Date Value Ref Range Status   08/19/2018 8.9 (H) 3.0 - 8.0 % Final     Eosinophil %   Date Value Ref Range Status   08/19/2018 3.3 0.0 - 4.0 % Final     Basophil %   Date Value Ref Range Status   08/19/2018 1.0 0.0 - 2.0 % Final     Immature Grans %   Date Value Ref Range Status   08/19/2018 0.3 0.0 - 0.5 % Final     Neutrophils, Absolute   Date Value Ref Range Status   08/19/2018 2.14 1.50 - 8.30 10*3/mm3 Final     Lymphocytes, Absolute   Date Value Ref Range Status   08/19/2018 1.27 0.60 - 4.80 10*3/mm3 Final     Monocytes, Absolute   Date Value Ref Range Status   08/19/2018 0.35 0.00 - 1.00 10*3/mm3 Final     Eosinophils, Absolute   Date Value Ref Range Status   08/19/2018 0.13 0.10 - 0.30 10*3/mm3 Final     Basophils, Absolute   Date Value Ref Range Status   08/19/2018 0.04 0.00 - 0.20 10*3/mm3 Final     Immature Grans, Absolute   Date Value Ref Range Status   08/19/2018 0.01 0.00 - 0.03 10*3/mm3 Final     nRBC   Date Value Ref Range Status   08/19/2018 0.0 0.0 - 0.0 /100 WBC Final       Glucose    Date Value Ref Range Status   08/19/2018 90 65 - 99 mg/dL Final     Sodium   Date Value Ref Range Status   08/19/2018 143 136 - 145 mmol/L Final     Potassium   Date Value Ref Range Status   08/19/2018 3.9 3.5 - 5.2 mmol/L Final     CO2   Date Value Ref Range Status   08/19/2018 24.1 22.0 - 29.0 mmol/L Final     Chloride   Date Value Ref Range Status   08/19/2018 107 98 - 107 mmol/L Final     Anion Gap   Date Value Ref Range Status   08/19/2018 11.9 mmol/L Final     Creatinine   Date Value Ref Range Status   08/19/2018 0.70 0.57 - 1.00 mg/dL Final     BUN   Date Value Ref Range Status   08/19/2018 8 6 - 20 mg/dL Final     BUN/Creatinine Ratio   Date Value Ref Range Status   08/19/2018 11.4 7.0 - 25.0 Final     Calcium   Date Value Ref Range Status   08/19/2018 8.3 (L) 8.6 - 10.5 mg/dL Final     eGFR Non  Amer   Date Value Ref Range Status   08/19/2018 87 >60 mL/min/1.73 Final     Alkaline Phosphatase   Date Value Ref Range Status   08/19/2018 105 40 - 129 U/L Final     Total Protein   Date Value Ref Range Status   08/19/2018 5.5 (L) 6.0 - 8.5 g/dL Final     ALT (SGPT)   Date Value Ref Range Status   08/19/2018 468 (H) 5 - 33 U/L Final     AST (SGOT)   Date Value Ref Range Status   08/19/2018 366 (H) 5 - 32 U/L Final     Total Bilirubin   Date Value Ref Range Status   08/19/2018 0.9 0.2 - 1.2 mg/dL Final     Albumin   Date Value Ref Range Status   08/19/2018 3.30 (L) 3.50 - 5.20 g/dL Final     Globulin   Date Value Ref Range Status   08/19/2018 2.2 gm/dL Final     Panceratitis  LFTs  UTIs  History of colon polyps    Awaiting MR due to persistant LFTs but concern for Drug induced or CMV/EBV persists.  Stick With Clear liquid until non Tender.

## 2018-08-19 NOTE — PROGRESS NOTES
"SERVICE: [facility] HOSPITALIST    CONSULTANTS:  GI    CHIEF COMPLAINT: Pancreatitis    SUBJECTIVE:    Pt was begging to eat something, tolerated a liquid diet. No other complaints    OBJECTIVE:    /77 (BP Location: Right arm, Patient Position: Lying)   Pulse 75   Temp 98.3 °F (36.8 °C) (Oral)   Resp 18   Ht 162 cm (63.78\")   Wt 77.1 kg (170 lb 1 oz)   LMP  (LMP Unknown)   SpO2 97%   BMI 29.39 kg/m²     MEDS/LABS REVIEWED AND ORDERED      buPROPion  mg Oral Daily   enoxaparin 40 mg Subcutaneous Q24H   levothyroxine 100 mcg Oral Q AM   losartan-HCTZ (HYZAAR) 100-12.5 combo dose  Oral Daily   pantoprazole 40 mg Oral QAM   venlafaxine XR 75 mg Oral Daily       Physical Exam   Constitutional: No distress.   Eyes: Pupils are equal, round, and reactive to light.   Cardiovascular: Normal rate, regular rhythm and normal heart sounds.    No murmur heard.  Pulmonary/Chest: Effort normal and breath sounds normal. No respiratory distress. She has no wheezes. She has no rales.   Abdominal: Soft. She exhibits no distension. There is no guarding.   Musculoskeletal: She exhibits no edema.   Neurological: She is alert.   Skin: Skin is warm and dry. She is not diaphoretic. No erythema.   Nursing note and vitals reviewed.      LAB/DIAGNOSTICS:    Lab Results (last 24 hours)     Procedure Component Value Units Date/Time    Hepatitis Panel, Acute [[id number]]  (Normal) Collected:  08/18/18 1105    Specimen:  Blood Updated:  08/19/18 0912     Hepatitis B Surface Ag Non-Reactive     Hep A IgM Non-Reactive     Hep B C IgM Non-Reactive     Hepatitis C Ab Non-Reactive    Comprehensive Metabolic Panel [[id number]]  (Abnormal) Collected:  08/19/18 0412    Specimen:  Blood Updated:  08/19/18 0519     Glucose 90 mg/dL      BUN 8 mg/dL      Creatinine 0.70 mg/dL      Sodium 143 mmol/L      Potassium 3.9 mmol/L      Chloride 107 mmol/L      CO2 24.1 mmol/L      Calcium 8.3 (L) mg/dL      Total Protein 5.5 (L) " g/dL      Albumin 3.30 (L) g/dL      ALT (SGPT) 468 (H) U/L      AST (SGOT) 366 (H) U/L      Alkaline Phosphatase 105 U/L      Total Bilirubin 0.9 mg/dL      eGFR Non African Amer 87 mL/min/1.73      Globulin 2.2 gm/dL      A/G Ratio 1.5 g/dL      BUN/Creatinine Ratio 11.4     Anion Gap 11.9 mmol/L     CBC & Differential [[id number]] Collected:  08/19/18 0412    Specimen:  Blood Updated:  08/19/18 0436    Narrative:       The following orders were created for panel order CBC & Differential.  Procedure                               Abnormality         Status                     ---------                               -----------         ------                     CBC Auto Differential[[id number]]        Abnormal            Final result                 Please view results for these tests on the individual orders.    CBC Auto Differential [[id number]]  (Abnormal) Collected:  08/19/18 0412    Specimen:  Blood Updated:  08/19/18 0436     WBC 3.94 (L) 10*3/mm3      RBC 3.81 (L) 10*6/mm3      Hemoglobin 11.3 (L) g/dL      Hematocrit 34.6 (L) %      MCV 90.8 fL      MCH 29.7 pg      MCHC 32.7 g/dL      RDW 13.1 %      RDW-SD 42.9 fl      MPV 9.7 fL      Platelets 178 10*3/mm3      Neutrophil % 54.3 %      Lymphocyte % 32.2 %      Monocyte % 8.9 (H) %      Eosinophil % 3.3 %      Basophil % 1.0 %      Immature Grans % 0.3 %      Neutrophils, Absolute 2.14 10*3/mm3      Lymphocytes, Absolute 1.27 10*3/mm3      Monocytes, Absolute 0.35 10*3/mm3      Eosinophils, Absolute 0.13 10*3/mm3      Basophils, Absolute 0.04 10*3/mm3      Immature Grans, Absolute 0.01 10*3/mm3      nRBC 0.0 /100 WBC         Results for orders placed in visit on 02/02/16   SCANNED - ECHOCARDIOGRAM     Ct Abdomen Pelvis With Contrast    Result Date: 8/19/2018  Mild hazy fat infiltration around the proximal pancreas without evidence of abnormal fluid collection. Mild hepatic periportal edema without convincing evidence of biliary obstruction. Question acute  pancreatitis.  This report was finalized on 8/19/2018 8:28 AM by Dr. Javad Grullon MD.      Us Abdomen Limited    Result Date: 8/19/2018  . Mild prominence of the biliary tree, likely related to prior cholecystectomy, and unchanged since CT examination of April 20, 2018.  Otherwise Negative right upper quadrant ultrasound.  This report was finalized on 8/19/2018 6:56 AM by Dr. Javad Grullon MD.        ASSESSMENT/PLAN:    Pancreatitis  - Per GI on liquid diet until exam is non-tender  - MRCP tomorrow  - Most likely drug induced (Bactrim) or CMV  - Continue pain control    Low Back strain  - Cotniue supportive care

## 2018-08-19 NOTE — PLAN OF CARE
Problem: Patient Care Overview  Goal: Plan of Care Review   08/19/18 0598   Coping/Psychosocial   Plan of Care Reviewed With patient   OTHER   Outcome Summary HAS NOT TAKEN ANY PAIN MEDS TODAY.PLACED ON CLEAR LIQUIDS NO V/V. HAS BEEN UP AMBULATING IN HALLWAY .   Plan of Care Review   Progress improving

## 2018-08-20 ENCOUNTER — APPOINTMENT (OUTPATIENT)
Dept: MRI IMAGING | Facility: HOSPITAL | Age: 53
End: 2018-08-20

## 2018-08-20 VITALS
SYSTOLIC BLOOD PRESSURE: 141 MMHG | WEIGHT: 170.06 LBS | BODY MASS INDEX: 29.03 KG/M2 | HEIGHT: 64 IN | DIASTOLIC BLOOD PRESSURE: 91 MMHG | HEART RATE: 79 BPM | OXYGEN SATURATION: 99 % | RESPIRATION RATE: 18 BRPM | TEMPERATURE: 97.7 F

## 2018-08-20 LAB
ALBUMIN SERPL-MCNC: 3.9 G/DL (ref 3.5–5.2)
ALBUMIN/GLOB SERPL: 1.5 G/DL
ALP SERPL-CCNC: 114 U/L (ref 40–129)
ALT SERPL W P-5'-P-CCNC: 352 U/L (ref 5–33)
AMYLASE SERPL-CCNC: 118 U/L (ref 28–100)
ANION GAP SERPL CALCULATED.3IONS-SCNC: 14 MMOL/L
AST SERPL-CCNC: 136 U/L (ref 5–32)
BASOPHILS # BLD AUTO: 0.05 10*3/MM3 (ref 0–0.2)
BASOPHILS NFR BLD AUTO: 0.9 % (ref 0–2)
BILIRUB SERPL-MCNC: 0.4 MG/DL (ref 0.2–1.2)
BUN BLD-MCNC: 5 MG/DL (ref 6–20)
BUN/CREAT SERPL: 7.7 (ref 7–25)
CALCIUM SPEC-SCNC: 8.8 MG/DL (ref 8.6–10.5)
CHLORIDE SERPL-SCNC: 103 MMOL/L (ref 98–107)
CMV IGG SERPL IA-ACNC: >10 U/ML (ref 0–0.59)
CMV IGM SERPL IA-ACNC: <30 AU/ML (ref 0–29.9)
CO2 SERPL-SCNC: 24 MMOL/L (ref 22–29)
CREAT BLD-MCNC: 0.65 MG/DL (ref 0.57–1)
DEPRECATED RDW RBC AUTO: 42.4 FL (ref 37–54)
EBV EA IGG SER-ACNC: <9 U/ML (ref 0–8.9)
EBV NA IGG SER IA-ACNC: 140 U/ML (ref 0–17.9)
EBV VCA IGG SER-ACNC: >600 U/ML (ref 0–17.9)
EBV VCA IGM SER-ACNC: <36 U/ML (ref 0–35.9)
EOSINOPHIL # BLD AUTO: 0.23 10*3/MM3 (ref 0.1–0.3)
EOSINOPHIL NFR BLD AUTO: 4 % (ref 0–4)
ERYTHROCYTE [DISTWIDTH] IN BLOOD BY AUTOMATED COUNT: 12.8 % (ref 11.5–14.5)
GFR SERPL CREATININE-BSD FRML MDRD: 95 ML/MIN/1.73
GLOBULIN UR ELPH-MCNC: 2.6 GM/DL
GLUCOSE BLD-MCNC: 110 MG/DL (ref 65–99)
HCT VFR BLD AUTO: 38.8 % (ref 37–47)
HGB BLD-MCNC: 12.8 G/DL (ref 12–16)
IMM GRANULOCYTES # BLD: 0.02 10*3/MM3 (ref 0–0.03)
IMM GRANULOCYTES NFR BLD: 0.3 % (ref 0–0.5)
INTERPRETATION: ABNORMAL
LIPASE SERPL-CCNC: 81 U/L (ref 13–60)
LYMPHOCYTES # BLD AUTO: 1.53 10*3/MM3 (ref 0.6–4.8)
LYMPHOCYTES NFR BLD AUTO: 26.5 % (ref 20–45)
MCH RBC QN AUTO: 29.5 PG (ref 27–31)
MCHC RBC AUTO-ENTMCNC: 33 G/DL (ref 31–37)
MCV RBC AUTO: 89.4 FL (ref 81–99)
MONOCYTES # BLD AUTO: 0.52 10*3/MM3 (ref 0–1)
MONOCYTES NFR BLD AUTO: 9 % (ref 3–8)
NEUTROPHILS # BLD AUTO: 3.42 10*3/MM3 (ref 1.5–8.3)
NEUTROPHILS NFR BLD AUTO: 59.3 % (ref 45–70)
NRBC BLD MANUAL-RTO: 0 /100 WBC (ref 0–0)
PLATELET # BLD AUTO: 247 10*3/MM3 (ref 140–500)
PMV BLD AUTO: 9.4 FL (ref 7.4–10.4)
POTASSIUM BLD-SCNC: 3.8 MMOL/L (ref 3.5–5.2)
PROT SERPL-MCNC: 6.5 G/DL (ref 6–8.5)
RBC # BLD AUTO: 4.34 10*6/MM3 (ref 4.2–5.4)
SODIUM BLD-SCNC: 141 MMOL/L (ref 136–145)
WBC NRBC COR # BLD: 5.77 10*3/MM3 (ref 4.8–10.8)

## 2018-08-20 PROCEDURE — 99232 SBSQ HOSP IP/OBS MODERATE 35: CPT | Performed by: INTERNAL MEDICINE

## 2018-08-20 PROCEDURE — 74183 MRI ABD W/O CNTR FLWD CNTR: CPT

## 2018-08-20 PROCEDURE — 25010000002 ENOXAPARIN PER 10 MG: Performed by: FAMILY MEDICINE

## 2018-08-20 PROCEDURE — 82150 ASSAY OF AMYLASE: CPT | Performed by: NURSE PRACTITIONER

## 2018-08-20 PROCEDURE — A9577 INJ MULTIHANCE: HCPCS | Performed by: FAMILY MEDICINE

## 2018-08-20 PROCEDURE — 85025 COMPLETE CBC W/AUTO DIFF WBC: CPT | Performed by: NURSE PRACTITIONER

## 2018-08-20 PROCEDURE — 0 GADOBENATE DIMEGLUMINE 529 MG/ML SOLUTION: Performed by: FAMILY MEDICINE

## 2018-08-20 PROCEDURE — 83690 ASSAY OF LIPASE: CPT | Performed by: NURSE PRACTITIONER

## 2018-08-20 PROCEDURE — 80053 COMPREHEN METABOLIC PANEL: CPT | Performed by: NURSE PRACTITIONER

## 2018-08-20 RX ORDER — LOSARTAN POTASSIUM 100 MG/1
100 TABLET ORAL
Qty: 30 TABLET | Refills: 0 | Status: SHIPPED | OUTPATIENT
Start: 2018-08-21 | End: 2018-09-20 | Stop reason: SDUPTHER

## 2018-08-20 RX ORDER — FAMOTIDINE 20 MG/1
40 TABLET, FILM COATED ORAL DAILY
Status: DISCONTINUED | OUTPATIENT
Start: 2018-08-20 | End: 2018-08-20 | Stop reason: HOSPADM

## 2018-08-20 RX ORDER — LOSARTAN POTASSIUM 50 MG/1
100 TABLET ORAL
Status: DISCONTINUED | OUTPATIENT
Start: 2018-08-20 | End: 2018-08-20 | Stop reason: HOSPADM

## 2018-08-20 RX ORDER — VENLAFAXINE HYDROCHLORIDE 37.5 MG/1
75 CAPSULE, EXTENDED RELEASE ORAL
Status: DISCONTINUED | OUTPATIENT
Start: 2018-08-21 | End: 2018-08-20 | Stop reason: HOSPADM

## 2018-08-20 RX ORDER — FAMOTIDINE 40 MG/1
40 TABLET, FILM COATED ORAL DAILY
Qty: 30 TABLET | Refills: 0 | Status: SHIPPED | OUTPATIENT
Start: 2018-08-21 | End: 2018-09-20

## 2018-08-20 RX ADMIN — GADOBENATE DIMEGLUMINE 15 ML: 529 INJECTION, SOLUTION INTRAVENOUS at 08:22

## 2018-08-20 RX ADMIN — LOSARTAN POTASSIUM 100 MG: 50 TABLET ORAL at 11:21

## 2018-08-20 RX ADMIN — FAMOTIDINE 40 MG: 20 TABLET ORAL at 14:17

## 2018-08-20 RX ADMIN — LEVOTHYROXINE SODIUM 100 MCG: 100 TABLET ORAL at 14:17

## 2018-08-20 RX ADMIN — ENOXAPARIN SODIUM 40 MG: 40 INJECTION SUBCUTANEOUS at 11:21

## 2018-08-20 RX ADMIN — BUPROPION HYDROCHLORIDE 150 MG: 150 TABLET, FILM COATED, EXTENDED RELEASE ORAL at 11:20

## 2018-08-20 NOTE — PROGRESS NOTES
"SERVICE: [facility] HOSPITALIST    CONSULTANTS: GI    CHIEF COMPLAINT: f/u acute pancreatitis    SUBJECTIVE: The patient reports she is still having epigastric tenderness but feels very hungry. She reports she \"cheated and ate a piece of candy and had no problems\" so she is requesting to eat real food and go home. She is tolerating clears this am without increased pain. She reports medications have been same for a while. She notes similar mild epigastric discomfort for many years. She otherwise denies f/c/cough/soa/chest pain/n/v/d or other new concerns.    OBJECTIVE:    /91 (BP Location: Right arm, Patient Position: Sitting)   Pulse 79   Temp 97.7 °F (36.5 °C) (Oral)   Resp 18   Ht 162 cm (63.78\")   Wt 77.1 kg (170 lb 1 oz)   LMP  (LMP Unknown)   SpO2 99%   BMI 29.39 kg/m²     MEDS/LABS REVIEWED AND ORDERED    buPROPion  mg Oral Daily   enoxaparin 40 mg Subcutaneous Q24H   famotidine 40 mg Oral Daily   levothyroxine 100 mcg Oral Q AM   losartan 100 mg Oral Q24H   [START ON 8/21/2018] venlafaxine XR 75 mg Oral Daily With Breakfast     Physical Exam   Constitutional: She is oriented to person, place, and time. She appears well-developed and well-nourished.   HENT:   Head: Normocephalic and atraumatic.   Eyes: Pupils are equal, round, and reactive to light.   Cardiovascular: Normal rate and regular rhythm.    Pulmonary/Chest: Effort normal and breath sounds normal.   Abdominal: Soft. Bowel sounds are normal. She exhibits no distension. There is no guarding.   Mid epigastric moderate tenderness with even minimal palpation   Musculoskeletal: She exhibits no edema.   Neurological: She is alert and oriented to person, place, and time.   Skin: Skin is warm and dry. No erythema.   Psychiatric: She has a normal mood and affect. Her behavior is normal.     LAB/DIAGNOSTICS:    Lab Results (last 24 hours)     Procedure Component Value Units Date/Time    Lipase [[id number]]  (Abnormal) " Collected:  08/20/18 0741    Specimen:  Blood Updated:  08/20/18 1107     Lipase 81 (H) U/L     Amylase [[id number]]  (Abnormal) Collected:  08/20/18 0741    Specimen:  Blood Updated:  08/20/18 1048     Amylase 118 (H) U/L     CMV IgG IgM [[id number]]  (Abnormal) Collected:  08/18/18 1624    Specimen:  Blood Updated:  08/20/18 0908     CMV IgG >10.00 (H) U/mL      Comment:                                Negative          <0.60                                 Equivocal   0.60 - 0.69                                 Positive          >0.69        CMV IgM <30.0 AU/mL      Comment:                                 Negative         <30.0                                  Equivocal  30.0 - 34.9                                  Positive         >34.9  A positive result is generally indicative of acute  infection, reactivation or persistent IgM production.       Narrative:       Performed at:  [address] [place] [address]  [id number]  : Kalin Suarez PhD, Phone:  [phone]    Comprehensive Metabolic Panel [[id number]]  (Abnormal) Collected:  08/20/18 0741    Specimen:  Blood Updated:  08/20/18 0816     Glucose 110 (H) mg/dL      BUN 5 (L) mg/dL      Creatinine 0.65 mg/dL      Sodium 141 mmol/L      Potassium 3.8 mmol/L      Chloride 103 mmol/L      CO2 24.0 mmol/L      Calcium 8.8 mg/dL      Total Protein 6.5 g/dL      Albumin 3.90 g/dL      ALT (SGPT) 352 (H) U/L      AST (SGOT) 136 (H) U/L      Alkaline Phosphatase 114 U/L      Total Bilirubin 0.4 mg/dL      eGFR Non African Amer 95 mL/min/1.73      Globulin 2.6 gm/dL      A/G Ratio 1.5 g/dL      BUN/Creatinine Ratio 7.7     Anion Gap 14.0 mmol/L     CBC & Differential [[id number]] Collected:  08/20/18 0746    Specimen:  Blood Updated:  08/20/18 0749    Narrative:       The following orders were created for panel order CBC & Differential.  Procedure                               Abnormality         Status                     ---------                                -----------         ------                     CBC Auto Differential[[id number]]        Abnormal            Final result                 Please view results for these tests on the individual orders.    CBC Auto Differential [[id number]]  (Abnormal) Collected:  08/20/18 0746    Specimen:  Blood Updated:  08/20/18 0749     WBC 5.77 10*3/mm3      RBC 4.34 10*6/mm3      Hemoglobin 12.8 g/dL      Hematocrit 38.8 %      MCV 89.4 fL      MCH 29.5 pg      MCHC 33.0 g/dL      RDW 12.8 %      RDW-SD 42.4 fl      MPV 9.4 fL      Platelets 247 10*3/mm3      Neutrophil % 59.3 %      Lymphocyte % 26.5 %      Monocyte % 9.0 (H) %      Eosinophil % 4.0 %      Basophil % 0.9 %      Immature Grans % 0.3 %      Neutrophils, Absolute 3.42 10*3/mm3      Lymphocytes, Absolute 1.53 10*3/mm3      Monocytes, Absolute 0.52 10*3/mm3      Eosinophils, Absolute 0.23 10*3/mm3      Basophils, Absolute 0.05 10*3/mm3      Immature Grans, Absolute 0.02 10*3/mm3      nRBC 0.0 /100 WBC         No orders to display     Results for orders placed in visit on 02/02/16   SCANNED - ECHOCARDIOGRAM     Ct Abdomen Pelvis With Contrast    Result Date: 8/19/2018  Mild hazy fat infiltration around the proximal pancreas without evidence of abnormal fluid collection. Mild hepatic periportal edema without convincing evidence of biliary obstruction. Question acute pancreatitis.  This report was finalized on 8/19/2018 8:28 AM by Dr. Javad Grullon MD.      Mri Abdomen W Wo Contrast Mrcp    Result Date: 8/20/2018   1. Mild peripancreatic inflammation most consistent with mild acute pancreatitis. No complicating features. 2. No intrahepatic or extrahepatic biliary dilatation. No choledocholithiasis.  This report was finalized on 8/20/2018 9:23 AM by Dr. José Miguel Ray MD.      ASSESSMENT/PLAN:  Acute pancreatitis suspected secondary to medications: GI following  GERD:   MRCP today confirms mild  CMV IgG +, IgM negative  D/C all medications that can  cause pancreatitis: effexor/HCTZ/protonix/bactrim  Change PPI to pepcid 40 mg daily, monitor for effect, still quite tender despite labs improving  Advance to full liquid, await further GI input    Hypertension: continue losartan, d/c hctz as above, monitor    Depression/anxiety: no acute issues, d/c effexor as above, continue wellbutrin    Chronic UTI: no acute issues, UA negative for infection    Impaired fasting glucose: A1C 5.8%, no acute issues    LBP: no acute issues on home baclofen    All patient's medications reviewed. Medication with pancreatitis in adverse effects, discontinued for now  Monitor for improvement off medication  Await further GI input

## 2018-08-20 NOTE — PLAN OF CARE
OT ACUTE Treatment Session    Pt seen on [facility] nursing unit.                                                          Frequency Comments: M T Th F     Admitting complaint:: Hyponatremia [E87.1]  Nausea and vomiting in adult [R11.2]  Abdominal pain, unspecified location [R10.9]                                                                                         Precautions  Hip Precautions: Posterior precautions (04/27/17 1426)  Weight Bearing Status: Toe touch weight bearing right (04/27/17 1426)  Weight Bearing Status Comments: s/p acetabular revision (04/27/17 1426)  Other Precautions: contact isolation (04/27/17 1426)  Precautions Comments: 4/27: VM left with Infection Control to clear for out of room activity, awaiting sticky note.  (04/27/17 1426)      ASSESSMENT:  Treatment today focused on progression of self cares, transfer training, and UE strengthening.   Current overall ADL status is supervision/set up to min assist for dressing, toileting, grooming, feeding tasks. Current functional mobility for ADL and Instrumental-ADL tasks is min assist with w/w for transfers and short distance ambulation. Fair tolerance for tband HEP for strengthening.  Extra time for session as pt expressed sadness regarding prolonged hospitalization and wanting to be able to leave room, writer frustrated over difficulty with placement and stating feeling \"out of the loop\". Writer left message for Infection Control to clear for therapy activity out of room, awaiting sticky note.  Patient  displays  fair tolerance for session. Continued skilled OT to address deficits noted and maximize self cares/functional mobility. Plan continues for ROSALVA when medically stable.    RECOMMENDATIONS FOR DISCHARGE:  Recommendations for Discharge: OT: Post acute therapy (04/27/17 1426)    OT Identified Barriers to Discharge: balance, activity tolerance, medical acuity/precautions     PT/OT Mobility Equipment for Discharge: Owns wheelchair and ww  Problem: Patient Care Overview  Goal: Plan of Care Review  Outcome: Ongoing (interventions implemented as appropriate)   08/20/18 7689   Coping/Psychosocial   Plan of Care Reviewed With patient   OTHER   Outcome Summary cont. to go without pain meds, VSS, NPO awaiting MRI and MRCP today    Plan of Care Review   Progress improving     Goal: Individualization and Mutuality  Outcome: Ongoing (interventions implemented as appropriate)      Problem: Pancreatitis, Acute/Chronic (Adult)  Goal: Signs and Symptoms of Listed Potential Problems Will be Absent, Minimized or Managed (Pancreatitis, Acute/Chronic)  Outcome: Ongoing (interventions implemented as appropriate)         (04/26/17 0935)  PT/OT ADL Equipment for Discharge: plan for ROSALVA (04/27/17 1426)    ICU Mobility Assesment (PERME):         PLAN: Continue skilled OT, including the following Treatment Interventions: ADL retraining;Functional transfer training;Endurance training;Patient/Family training;Equipment eval/education;Compensatory technique education (04/25/17 1121)   Treatment Plan for Next Session: w/c propulsion, sink side ADLs  Additional Plan Considerations: bring w/c       EDUCATION:   On this date, the patient was educated on role of OT, POC, ADL techniques, transfer techniques, and w/w safety.    The response to education was: Verbalizes understanding, Demonstrates understanding and Needs reinforcement                                                    SUBJECTIVE: Patient's Personal Goal: return home after rehab (04/27/17 1426)   Subjective: \"Im going crazy in here\" referring to long hospitalization (04/27/17 1426)  Subjective/Objective Comments: agreeable to session; returns to recliner at end with alarm armed and needs at hand.  (04/27/17 1426)    OBJECTIVE:Basic Lines: Capped IV (04/27/17 1426)  Safety Measures: Alarms;Bed rails (04/27/17 1426)    RN reported Diggs Fall Scale Score: 60       Last 24 hours of Functional Data     ADLs  Self Cares/ADL's  Eating Assistance: Independent;Chair (04/27/17 1426)  Grooming Assistance: Set-up;Chair (04/27/17 1426)  Grooming/Oral Hygiene Deficit: Wash/dry face;Brushing hair (04/27/17 1426)  Bathing Assistance: Not applicable (completed prior to session) (04/27/17 1426)  Upper Body Dressing Assistance: Minimal Assist (Min);Chair (04/27/17 1426)  Upper Body Dressing Deficit: Pull around back (04/27/17 1426)  Toileting Assistance: Minimal Assist (Min) (04/27/17 1426)  Toileting Deficit: Steadying;Verbal cueing;Use of adaptive equipment (04/27/17 1426)  Toileting Equipment Used: Bedside commode (04/27/17 1426)    Household mobility  Household Mobility  Sit to Stand: Minimal  Assist (Min) (04/27/17 1426)  Stand to Sit: Minimal Assist (Min) (04/27/17 1426)  Toilet Transfers: Minimal Assist (Min) (04/27/17 1426)  Sitting - Static: Independent (04/27/17 1426)  Sitting - Dynamic: Independent (04/27/17 1426)  Standing - Static: Minimal Assist (Min) (04/27/17 1426)  Standing - Dynamic: Minimal Assist (Min) (04/27/17 1426)  Household Mobility Comments #1: grossly min assist for hand placement, balance, steadying, safety with transfers this date (04/27/17 1426)  Household Mobility Comments #2: Short room ambulation with w/w at min assist for balance, weakness, safety again with w/w. Fair/good compliance with TTWB on RLE restriction.  (04/27/17 1426)    Home Management  Home Management Skills  Home Management Skills Comments: TBA (04/27/17 1426)    Tolerance  OT Activity Tolerance  Activity Tolerance: 1:1 Activity to rest (04/27/17 1426)  Vital Signs: on RA (04/27/17 1426)  Activity Tolerance Comments: fair tolerance with rest braeks (04/27/17 1426)    Cognition  Communication/Cognition  Communication: Clear speech;Appropriate for developmental age (04/27/17 1426)  Safety Judgement: Decreased awareness of need for assistance;Decreased awareness of need for safety (04/27/17 1426)  Awareness of Deficits: Decreased awareness of deficits (04/27/17 1426)    Patient's Personal Goal: return home after rehab (04/27/17 1426)    Therapy Goals:    Goals  Short Term Goals to Be Reviewed On: 05/01/17 (04/24/17 1113)  Short Term Goals Are The Same as Discharge Goals: Yes (04/24/17 1113)  Goal Agreement: Patient agrees with goals and treatment plan (04/24/17 1113)  Grooming Discharge Goal 1: 3 grooming at sink side at mod I  (04/24/17 1113)  Bathing Discharge Goal 1: mod I for sponge bathe from seated (04/24/17 1113)  Dressing Discharge Goal 1: UB with mod I (04/24/17 1113)  Dressing Discharge Goal 2: LB with mod I, AE (04/24/17 1113)  Toileting Discharge Goal 1: mod I (04/24/17 1113)  Other Discharge Goal 1:  Compliance with hip and TTWB precautions (04/24/17 1113)  Other Short Term Goal 2: independent with tband HEP for UE strengthening (04/24/17 1113)        Total Treatment Time:  OT Time Spent: 38 minutes (04/27/17 1758)    See OT flowsheet for full details regarding the OT therapy provided.

## 2018-08-20 NOTE — DISCHARGE SUMMARY
ELYSSA CHELSEY  1965  [id number]    Hospitalists Admission/Discharge Summary    Date of Admission: 8/18/2018  Date of Discharge:  8/20/2018    Primary Discharge Diagnoses: Pancreatitis    Secondary Discharge Diagnoses:   HTN  Depression    PCP  Meryl Mesa MD    Consults:   Consults     Date and Time Order Name Status Description    8/19/2018 0030 Inpatient Gastroenterology Consult            Family History   Problem Relation Age of Onset   • Diabetes Mother    • Anxiety disorder Mother    • Depression Mother    • Diabetes Father    • Heart disease Father         CABG x 3 in early 60s   • Hypertension Father    • Anxiety disorder Sister    • Depression Sister    • Cancer Daughter         Synovial Cell Sarcoma     Past Medical History:   Diagnosis Date   • Colon polyps     c-scope was fall 2014. due for repeat in 2-3 years.   • Depression    • Gastroesophageal reflux disease 2/28/2017   • Headache     More problems when big fluctuations in weather   • History of frequent urinary tract infections    • Humerus fracture    • Hypercholesterolemia     High Triglycerides   • Hypertension    • Pancreatitis    • Plantar fasciitis    • Sleep apnea      Past Surgical History:   Procedure Laterality Date   • APPENDECTOMY     • CHOLECYSTECTOMY     • COLONOSCOPY     • HYSTERECTOMY      Partial   • SINUS SURGERY     • STOMACH SURGERY  12/10/2013    Gastric sleve   • TONSILLECTOMY       Social History     Social History   • Marital status:      Spouse name: N/A   • Number of children: N/A   • Years of education: N/A     Occupational History   • Not on file.     Social History Main Topics   • Smoking status: Never Smoker   • Smokeless tobacco: Not on file   • Alcohol use 0.6 oz/week     1 Glasses of wine per week      Comment: 2 X PER MONTH    • Drug use: No   • Sexual activity: Yes     Partners: Male     Birth control/ protection: Post-menopausal     Other Topics Concern   • Not on file     Social  History Narrative   • No narrative on file     Allergies   Allergen Reactions   • Ceftin [Cefuroxime Axetil]    • Paroxetine    • Ciprofloxacin Rash   • Metronidazole Rash          Discharge Medications      New Medications      Instructions Start Date   famotidine 40 MG tablet  Commonly known as:  PEPCID   40 mg, Oral, Daily      losartan 100 MG tablet  Commonly known as:  COZAAR   100 mg, Oral, Every 24 Hours Scheduled         Changes to Medications      Instructions Start Date   ALPRAZolam XR 0.5 MG 24 hr tablet  Commonly known as:  XANAX XR  What changed:  · when to take this  · reasons to take this   0.5 mg, Oral, Daily      azelastine 0.15 % solution nasal spray  Commonly known as:  ASTEPRO  What changed:  · when to take this  · reasons to take this   2 sprays, Nasal, 2 Times Daily         Continue These Medications      Instructions Start Date   albuterol 108 (90 Base) MCG/ACT inhaler  Commonly known as:  PROVENTIL HFA;VENTOLIN HFA   Ventolin  (90 Base) MCG/ACT Inhalation Aerosol Solution; Patient Sig: Ventolin  (90 Base) MCG/ACT Inhalation Aerosol Solution INHALE 2-4 PUFFS Q 4 HOURS PRN/COUGH/WHEEZE; 1; 0; 21-Aug-2015; Active      buPROPion  MG 24 hr tablet  Commonly known as:  WELLBUTRIN XL   150 mg, Oral, Daily      clobetasol 0.05 % ointment  Commonly known as:  TEMOVATE   Topical, 2 Times Daily      DHEA 50 PO   TAKE ONE Capsule BY MOUTH EVERY DAY before noon      estradiol 0.1 MG/GM vaginal cream  Commonly known as:  ESTRACE   1 g, Vaginal, 2 Times Weekly      levothyroxine 50 MCG tablet  Commonly known as:  SYNTHROID, LEVOTHROID   take two  Tablets by mouth every morning on an empty stomach total 100mcg      liothyronine 5 MCG tablet  Commonly known as:  CYTOMEL   take one Tablet by mouth every morning on an empty stomach      PROGESTERONE PO   TAKE ONE Capsule BY MOUTH EVERY NIGHT AT BEDTIME      valACYclovir 1000 MG tablet  Commonly known as:  VALTREX   1,000 mg, Oral, 3 Times  Daily      venlafaxine XR 75 MG 24 hr capsule  Commonly known as:  EFFEXOR-XR   75 mg, Oral, Daily      Vitamin D3 00462 units tablet   chew one tablet by mouth every day WITH FOOD         Stop These Medications    losartan-hydrochlorothiazide 100-12.5 MG per tablet  Commonly known as:  HYZAAR     omeprazole 40 MG capsule  Commonly known as:  priLOSEC     predniSONE 10 MG tablet  Commonly known as:  DELTASONE     sulfamethoxazole-trimethoprim 800-160 MG per tablet  Commonly known as:  BACTRIM DS,SEPTRA DS                  CC: abd pain    History of Present Illness:  Pt a 52 yo female admitted with abd pain found to have pancreatitis    Hospital Course  Acute pancreatitis suspected secondary to medications: GI follow up as outpt  GERD:   MRCP today confirms mild  CMV IgG +, IgM negative  D/C all medications that can cause pancreatitis: effexor/HCTZ/protonix/bactrim  Change PPI to pepcid 40 mg daily, monitor for effect,   GI cleared for d/c tolerating diet at discharge     Hypertension: continue losartan, d/c hctz as above, monitor     Depression/anxiety: no acute issues, d/c effexor as above, continue wellbutrin     Chronic UTI: no acute issues, UA negative for infection     Impaired fasting glucose: A1C 5.8%, no acute issues     LBP: no acute issues on home baclofen    Lab Results (last 72 hours)     Procedure Component Value Units Date/Time    EBV, Chrnic / Active Infection [[id number]]  (Abnormal) Collected:  08/18/18 1624    Specimen:  Blood Updated:  08/20/18 1410     EBV Early Antigen Ab, IgG <9.0 U/mL      Comment:                                  Negative        < 9.0                                   Equivocal  9.0 - 10.9                                   Positive        >10.9        EBV VCA IgG >600.0 (H) U/mL      Comment:                                  Negative        <18.0                                   Equivocal 18.0 - 21.9                                   Positive        >21.9        EBV Nuclear  Antigen Ab, IgG 140.0 (H) U/mL      Comment:                                  Negative        <18.0                                   Equivocal 18.0 - 21.9                                   Positive        >21.9        Interpretation Comment     Comment:                EBV Interpretation Chart  Interpretation   EBV-IgM  EA(D)-IgG  VCA-IgG  EBNA-IgG  EBV Seronegative    -        -         -          -  Early Phase         +        -         -          -  Acute Primary       +       +or-       +          -  Infection  Convalescence/Past  -       +or-       +          +  Infection  Reactivated        +or-      +         +          +  Infection         + Antibody Present      - Antibody Absent       Narrative:       Performed at:  [address] [place] [address]  [id number]  : Kalin Suarez PhD, Phone:  [phone]    Tricia-Barr Virus VCA, IgM [[id number]] Collected:  08/18/18 1624    Specimen:  Blood Updated:  08/20/18 1410     EBV VCA IgM <36.0 U/mL      Comment:                                  Negative        <36.0                                   Equivocal 36.0 - 43.9                                   Positive        >43.9       Narrative:       Performed at:  [address] [place] [address]  [id number]  : Kalin Suarez PhD, Phone:  [phone]    Lipase [[id number]]  (Abnormal) Collected:  08/20/18 0741    Specimen:  Blood Updated:  08/20/18 1107     Lipase 81 (H) U/L     Amylase [[id number]]  (Abnormal) Collected:  08/20/18 0741    Specimen:  Blood Updated:  08/20/18 1048     Amylase 118 (H) U/L     CMV IgG IgM [[id number]]  (Abnormal) Collected:  08/18/18 1624    Specimen:  Blood Updated:  08/20/18 0908     CMV IgG >10.00 (H) U/mL      Comment:                                Negative          <0.60                                 Equivocal   0.60 - 0.69                                 Positive          >0.69        CMV IgM <30.0 AU/mL      Comment:                                  Negative         <30.0                                  Equivocal  30.0 - 34.9                                  Positive         >34.9  A positive result is generally indicative of acute  infection, reactivation or persistent IgM production.       Narrative:       Performed at:  [address] [place] [address]  [id number]  : Kalin Suarez PhD, Phone:  [phone]    Comprehensive Metabolic Panel [[id number]]  (Abnormal) Collected:  08/20/18 0741    Specimen:  Blood Updated:  08/20/18 0816     Glucose 110 (H) mg/dL      BUN 5 (L) mg/dL      Creatinine 0.65 mg/dL      Sodium 141 mmol/L      Potassium 3.8 mmol/L      Chloride 103 mmol/L      CO2 24.0 mmol/L      Calcium 8.8 mg/dL      Total Protein 6.5 g/dL      Albumin 3.90 g/dL      ALT (SGPT) 352 (H) U/L      AST (SGOT) 136 (H) U/L      Alkaline Phosphatase 114 U/L      Total Bilirubin 0.4 mg/dL      eGFR Non African Amer 95 mL/min/1.73      Globulin 2.6 gm/dL      A/G Ratio 1.5 g/dL      BUN/Creatinine Ratio 7.7     Anion Gap 14.0 mmol/L     CBC & Differential [[id number]] Collected:  08/20/18 0746    Specimen:  Blood Updated:  08/20/18 0749    Narrative:       The following orders were created for panel order CBC & Differential.  Procedure                               Abnormality         Status                     ---------                               -----------         ------                     CBC Auto Differential[[id number]]        Abnormal            Final result                 Please view results for these tests on the individual orders.    CBC Auto Differential [[id number]]  (Abnormal) Collected:  08/20/18 0746    Specimen:  Blood Updated:  08/20/18 0749     WBC 5.77 10*3/mm3      RBC 4.34 10*6/mm3      Hemoglobin 12.8 g/dL      Hematocrit 38.8 %      MCV 89.4 fL      MCH 29.5 pg      MCHC 33.0 g/dL      RDW 12.8 %      RDW-SD 42.4 fl      MPV 9.4 fL      Platelets 247 10*3/mm3      Neutrophil  % 59.3 %      Lymphocyte % 26.5 %      Monocyte % 9.0 (H) %      Eosinophil % 4.0 %      Basophil % 0.9 %      Immature Grans % 0.3 %      Neutrophils, Absolute 3.42 10*3/mm3      Lymphocytes, Absolute 1.53 10*3/mm3      Monocytes, Absolute 0.52 10*3/mm3      Eosinophils, Absolute 0.23 10*3/mm3      Basophils, Absolute 0.05 10*3/mm3      Immature Grans, Absolute 0.02 10*3/mm3      nRBC 0.0 /100 WBC     Hepatitis Panel, Acute [[id number]]  (Normal) Collected:  08/18/18 1105    Specimen:  Blood Updated:  08/19/18 0912     Hepatitis B Surface Ag Non-Reactive     Hep A IgM Non-Reactive     Hep B C IgM Non-Reactive     Hepatitis C Ab Non-Reactive    Comprehensive Metabolic Panel [[id number]]  (Abnormal) Collected:  08/19/18 0412    Specimen:  Blood Updated:  08/19/18 0519     Glucose 90 mg/dL      BUN 8 mg/dL      Creatinine 0.70 mg/dL      Sodium 143 mmol/L      Potassium 3.9 mmol/L      Chloride 107 mmol/L      CO2 24.1 mmol/L      Calcium 8.3 (L) mg/dL      Total Protein 5.5 (L) g/dL      Albumin 3.30 (L) g/dL      ALT (SGPT) 468 (H) U/L      AST (SGOT) 366 (H) U/L      Alkaline Phosphatase 105 U/L      Total Bilirubin 0.9 mg/dL      eGFR Non African Amer 87 mL/min/1.73      Globulin 2.2 gm/dL      A/G Ratio 1.5 g/dL      BUN/Creatinine Ratio 11.4     Anion Gap 11.9 mmol/L     CBC & Differential [[id number]] Collected:  08/19/18 0412    Specimen:  Blood Updated:  08/19/18 0436    Narrative:       The following orders were created for panel order CBC & Differential.  Procedure                               Abnormality         Status                     ---------                               -----------         ------                     CBC Auto Differential[[id number]]        Abnormal            Final result                 Please view results for these tests on the individual orders.    CBC Auto Differential [[id number]]  (Abnormal) Collected:  08/19/18 0412    Specimen:  Blood Updated:  08/19/18 0436     WBC 3.94 (L)  10*3/mm3      RBC 3.81 (L) 10*6/mm3      Hemoglobin 11.3 (L) g/dL      Hematocrit 34.6 (L) %      MCV 90.8 fL      MCH 29.7 pg      MCHC 32.7 g/dL      RDW 13.1 %      RDW-SD 42.9 fl      MPV 9.7 fL      Platelets 178 10*3/mm3      Neutrophil % 54.3 %      Lymphocyte % 32.2 %      Monocyte % 8.9 (H) %      Eosinophil % 3.3 %      Basophil % 1.0 %      Immature Grans % 0.3 %      Neutrophils, Absolute 2.14 10*3/mm3      Lymphocytes, Absolute 1.27 10*3/mm3      Monocytes, Absolute 0.35 10*3/mm3      Eosinophils, Absolute 0.13 10*3/mm3      Basophils, Absolute 0.04 10*3/mm3      Immature Grans, Absolute 0.01 10*3/mm3      nRBC 0.0 /100 WBC     Lipid Panel [[id number]] Collected:  08/18/18 1105    Specimen:  Blood Updated:  08/18/18 1126     Total Cholesterol 129 mg/dL      Triglycerides 70 mg/dL      HDL Cholesterol 55 mg/dL      LDL Cholesterol  60 mg/dL      VLDL Cholesterol 14 mg/dL      LDL/HDL Ratio 1.09    Hemoglobin A1c [[id number]]  (Abnormal) Collected:  08/18/18 1105    Specimen:  Blood Updated:  08/18/18 1125     Hemoglobin A1C 5.80 (H) %     Narrative:       Hemoglobin A1C Ranges:    Increased Risk for Diabetes  5.7% to 6.4%  Diabetes                     >= 6.5%  Diabetic Goal                < 7.0%    Lipase [[id number]]  (Abnormal) Collected:  08/18/18 0609    Specimen:  Blood Updated:  08/18/18 0702     Lipase 1,042 (H) U/L     Comprehensive Metabolic Panel [[id number]]  (Abnormal) Collected:  08/18/18 0609    Specimen:  Blood Updated:  08/18/18 0702     Glucose 115 (H) mg/dL      BUN 14 mg/dL      Creatinine 0.88 mg/dL      Sodium 140 mmol/L      Potassium 4.1 mmol/L      Chloride 107 mmol/L      CO2 24.2 mmol/L      Calcium 8.1 (L) mg/dL      Total Protein 5.4 (L) g/dL      Albumin 3.60 g/dL      ALT (SGPT) 335 (H) U/L      AST (SGOT) 662 (H) U/L      Alkaline Phosphatase 81 U/L      Total Bilirubin 0.5 mg/dL      eGFR Non African Amer 67 mL/min/1.73      Globulin 1.8 gm/dL      A/G Ratio 2.0 g/dL       BUN/Creatinine Ratio 15.9     Anion Gap 8.8 mmol/L     Amylase [[id number]]  (Abnormal) Collected:  08/18/18 0609    Specimen:  Blood Updated:  08/18/18 0658     Amylase 641 (H) U/L     Lactic Acid, Plasma [[id number]]  (Normal) Collected:  08/18/18 0609    Specimen:  Blood Updated:  08/18/18 0652     Lactate 1.3 mmol/L     Lipase [[id number]]  (Abnormal) Collected:  08/18/18 0134    Specimen:  Blood Updated:  08/18/18 0204     Lipase 1,110 (H) U/L     Urinalysis With Microscopic If Indicated (No Culture) - Urine, Clean Catch [[id number]]  (Normal) Collected:  08/18/18 0133    Specimen:  Urine from Urine, Clean Catch Updated:  08/18/18 0203     Color, UA Yellow     Appearance, UA Clear     pH, UA 7.0     Specific Gravity, UA 1.015     Glucose, UA Negative     Ketones, UA Negative     Bilirubin, UA Negative     Blood, UA Negative     Protein, UA Negative     Leuk Esterase, UA Negative     Nitrite, UA Negative     Urobilinogen, UA 0.2 E.U./dL    Narrative:       Urine microscopic not indicated.    Comprehensive Metabolic Panel [[id number]]  (Abnormal) Collected:  08/18/18 0134    Specimen:  Blood Updated:  08/18/18 0202     Glucose 105 (H) mg/dL      BUN 17 mg/dL      Creatinine 1.02 (H) mg/dL      Sodium 140 mmol/L      Potassium 4.0 mmol/L      Chloride 102 mmol/L      CO2 24.2 mmol/L      Calcium 9.4 mg/dL      Total Protein 6.7 g/dL      Albumin 4.40 g/dL      ALT (SGPT) 44 (H) U/L      AST (SGOT) 95 (H) U/L      Alkaline Phosphatase 76 U/L      Total Bilirubin 0.3 mg/dL      eGFR Non African Amer 56 (L) mL/min/1.73      Globulin 2.3 gm/dL      A/G Ratio 1.9 g/dL      BUN/Creatinine Ratio 16.7     Anion Gap 13.8 mmol/L     CBC & Differential [[id number]] Collected:  08/18/18 0134    Specimen:  Blood Updated:  08/18/18 0142    Narrative:       The following orders were created for panel order CBC & Differential.  Procedure                               Abnormality         Status                     ---------                                -----------         ------                     CBC Auto Differential[[id number]]        Abnormal            Final result                 Please view results for these tests on the individual orders.    CBC Auto Differential [[id number]]  (Abnormal) Collected:  08/18/18 0134    Specimen:  Blood Updated:  08/18/18 0142     WBC 8.75 10*3/mm3      RBC 4.35 10*6/mm3      Hemoglobin 12.9 g/dL      Hematocrit 39.1 %      MCV 89.9 fL      MCH 29.7 pg      MCHC 33.0 g/dL      RDW 13.1 %      RDW-SD 42.8 fl      MPV 9.5 fL      Platelets 256 10*3/mm3      Neutrophil % 56.2 %      Lymphocyte % 32.5 %      Monocyte % 8.7 (H) %      Eosinophil % 1.8 %      Basophil % 0.6 %      Immature Grans % 0.2 %      Neutrophils, Absolute 4.92 10*3/mm3      Lymphocytes, Absolute 2.84 10*3/mm3      Monocytes, Absolute 0.76 10*3/mm3      Eosinophils, Absolute 0.16 10*3/mm3      Basophils, Absolute 0.05 10*3/mm3      Immature Grans, Absolute 0.02 10*3/mm3      nRBC 0.0 /100 WBC         Imaging Results (most recent)     Procedure Component Value Units Date/Time    MRI abdomen w wo contrast mrcp [[id number]] Collected:  08/20/18 0914     Updated:  08/20/18 0926    Narrative:       MRI ABDOMEN W WO CONTRAST MRCP-: 8/20/2018 7:45 AM     INDICATION:    Chronic epigastric pain. Pancreatitis.     TECHNIQUE:   MRI of the abdomen with and without contrast. Thin and thick slab MRCP  sequences were obtained.     COMPARISON:    CT abdomen pelvis 8/18/2018.     FINDINGS:  Mild peripancreatic inflammation suggesting acute pancreatitis. This is  similar to the 8/18/2018 comparison, when taking into account  differences in technique. There is no acute loculated peripancreatic  fluid collections. Pancreatic duct is normal in caliber. No suspicious  pancreatic mass. There is a small cystic lesion in the pancreatic tail  measuring 0.6 cm. In retrospect, this is stable from at least 2014 and  considered benign. The peripancreatic vasculature is  patent.     Liver is within normal. No intrahepatic or intrahepatic biliary  dilatation. Gallbladder surgically absent. Spleen, adrenal glands, and  kidneys are within normal limits. Linear areas of hemosiderin deposition  along the posterior margin of the spleen suggests a prior subcapsular  hematoma.       Impression:          1. Mild peripancreatic inflammation most consistent with mild acute  pancreatitis. No complicating features.  2. No intrahepatic or extrahepatic biliary dilatation. No  choledocholithiasis.     This report was finalized on 8/20/2018 9:23 AM by Dr. José Miguel Ray MD.       CT Abdomen Pelvis With Contrast [[id number]] Collected:  08/19/18 0825     Updated:  08/19/18 0830    Narrative:       CT ABDOMEN PELVIS WITH ORAL AND IV CONTRAST 8/18/2018     HISTORY: Epigastric pain began this morning. HISTORY of pancreatitis.     TECHNIQUE: Axial CT abdomen pelvis with oral and IV contrast and  multiplanar reformats Radiation dose reduction techniques were utilized,  including automated exposure control and exposure modulation based on  body size. 1 CT and / or nuclear cardiology exams in the past 12 months  according to records at this institution.     COMPARISON: CT abdomen pelvis 4/22/2018     LUNG BASES:Unremarkable     ABDOMEN FINDINGS: Is been cholecystectomy. There is mild periportal  edema. There is hazy fat infiltration near the pancreatic head and  uncinate process, suggesting possible pancreatitis. There is no abnormal  fluid collection. The portal and splenic veins are normally patent     The distal pancreas and spleen are normal. The kidneys and adrenal  glands show no acute abnormality. The aorta is normal in caliber and  there is no bowel obstruction.     PELVIS FINDINGS: There is no pelvic mass or inflammatory change or  abnormal collection. There is no hernia or bowel obstruction. The bony  structures are unremarkable.       Impression:       Mild hazy fat infiltration around the proximal  pancreas  without evidence of abnormal fluid collection. Mild hepatic periportal  edema without convincing evidence of biliary obstruction. Question acute  pancreatitis.     This report was finalized on 8/19/2018 8:28 AM by Dr. Javad Grullon MD.       US Abdomen Limited [[id number]] Collected:  08/19/18 0654     Updated:  08/19/18 0658    Narrative:       INDICATION: Prior cholecystectomy, 1 day history of abdominal pain     EXAM: Abdominal ultrasound, limited to the right upper quadrant.     COMPARISON: CT abdomen pelvis dated 4/22/2018     FINDINGS:  Visualized portions of the pancreas are within normal limits.      The echogenicity and echotexture of the hepatic parenchyma is within  normal limits. No hepatic mass. There is minimal prominence of the  intrahepatic biliary tree, unchanged since the CT examination of April 20, 2018. The common duct is normal in size at the nick hepatis.      The gallbladder is surgically absent.     The right kidney is normal in size. Renal cortical thickness and  echogenicity is normal. No hydronephrosis. Small simple renal cyst     No ascites.       Impression:       . Mild prominence of the biliary tree, likely related to prior  cholecystectomy, and unchanged since CT examination of April 20, 2018.     Otherwise Negative right upper quadrant ultrasound.     This report was finalized on 8/19/2018 6:56 AM by Dr. Javad Grullon MD.               PROCEDURES      Condition on Discharge:  stable    Physical Exam at Discharge  Temp:  [97 °F (36.1 °C)-98.8 °F (37.1 °C)] 97.7 °F (36.5 °C)  Heart Rate:  [71-97] 79  Resp:  [18] 18  BP: (118-141)/(61-91) 141/91    Physical Exam:  Physical Exam   Constitutional: Patient appears well-developed and well-nourished and in no acute distress   HEENT:   Head: Normocephalic and atraumatic.   Eyes:  Pupils are equal, round, and reactive to light. EOM are intact. Sclera are anicteric and non-injected.  Mouth and Throat: Patient has moist mucous  membranes. Oropharynx is clear of any erythema or exudate.     Neck: Neck supple. No JVD present. No thyromegaly present. No lymphadenopathy present.  Cardiovascular: Regular rate, regular rhythm, S1 normal and S2 normal.  Exam reveals no gallop and no friction rub.  No murmur heard.  Pulmonary/Chest: Lungs are clear to auscultation bilaterally. No respiratory distress. No wheezes. No rhonchi. No rales.   Abdominal: Soft. Bowel sounds are normal. No distension and no mass. There is no hepatosplenomegaly. There is no tenderness.   Musculoskeletal: Normal Muscle tone  Extremities: No edema. Pulses are palpable in all 4 extremities.  Neurological: Patient is alert and oriented to person, place, and time. Cranial nerves II-XII are grossly intact with no focal deficits.  Skin: Skin is warm. No rash noted. Nails show no clubbing.  No cyanosis or erythema.    Discharge Disposition  Home with family            Discharge Diet:    Dietary Orders     Start     Ordered    08/20/18 1215  Diet Full Liquid; GI Soft / [facility]  Diet Effective Now     Question Answer Comment   Diet Texture / Consistency Full Liquid    Common Modifiers GI Soft / [facility]        08/20/18 1214          Activity at Discharge:  As tolerated          Follow-up Appointments  Future Appointments  Date Time Provider Department Center   10/15/2018 9:15 AM Mei Padilla MD [facility] None     Additional Instructions for the Follow-ups that You Need to Schedule     Discharge Follow-up with PCP    As directed      Currently Documented PCP:  Meryl Mesa MD  PCP Phone Number:  [phone]    Follow Up Details:  one week         Discharge Follow-up with Specified Provider: Gastroenterology Dr Chavez; 2 Weeks    As directed      To:  Gastroenterology Dr Chavez    Follow Up:  2 Weeks             No future appointments.      Test Results Pending at Discharge       Zheng Kowalski DO  08/20/18  5:47 PM    Time:30

## 2018-08-20 NOTE — NURSING NOTE
Continued Stay Note  SHADE Reyes     Patient Name: Neville Rodriges  MRN: [id number]  Today's Date: 8/20/2018    Admit Date: 8/18/2018          Discharge Plan     Row Name 08/20/18 1511       Plan    Plan plan home, no needs    Patient/Family in Agreement with Plan yes    Plan Comments Spoke with patient at bedside, she is feeling better and anxious to go home. She is anticipating dc today, denies needs. Will continue to follow.              Discharge Codes    No documentation.           Jose Menezes RN

## 2018-08-20 NOTE — PROGRESS NOTES
GI Daily Progress Note    Assessment/Plan:    Active Problems:    Acute pancreatitis       LOS: 2 days     Neville Rodriges is a 53 y.o. female who was admitted with pancreatitis. She reports his symptoms are improving with treatment. Clinically taolerating fulls plus a candy bar. Labs improved and MRI reviewed and is negative for either CBD stones or pancreatic mass.    Subjective:    Patient expresses abdominal pain  Patient denies vomiting, diarrhea and loss of appetite    Objective:    Vital signs in last 24 hours:  Temp:  [97 °F (36.1 °C)-98.8 °F (37.1 °C)] 97.7 °F (36.5 °C)  Heart Rate:  [71-97] 79  Resp:  [18] 18  BP: (118-141)/(61-91) 141/91    Intake/Output last 3 shifts:  I/O last 3 completed shifts:  In: 1165 [P.O.:300; I.V.:865]  Out: 3900 [Urine:3900]  Intake/Output this shift:  I/O this shift:  In: 360 [P.O.:360]  Out: 1250 [Urine:1250]    Physical Exam:Abdomen  Sounds Normal Active Bowel Sounds   Distension Soft   Tenderness Mildly Tender     Imaging Results (last 72 hours)     Procedure Component Value Units Date/Time    MRI abdomen w wo contrast mrcp [[id number]] Collected:  08/20/18 0914     Updated:  08/20/18 0926    Narrative:       MRI ABDOMEN W WO CONTRAST MRCP-: 8/20/2018 7:45 AM     INDICATION:    Chronic epigastric pain. Pancreatitis.     TECHNIQUE:   MRI of the abdomen with and without contrast. Thin and thick slab MRCP  sequences were obtained.     COMPARISON:    CT abdomen pelvis 8/18/2018.     FINDINGS:  Mild peripancreatic inflammation suggesting acute pancreatitis. This is  similar to the 8/18/2018 comparison, when taking into account  differences in technique. There is no acute loculated peripancreatic  fluid collections. Pancreatic duct is normal in caliber. No suspicious  pancreatic mass. There is a small cystic lesion in the pancreatic tail  measuring 0.6 cm. In retrospect, this is stable from at least 2014 and  considered benign. The peripancreatic vasculature is patent.     Liver  is within normal. No intrahepatic or intrahepatic biliary  dilatation. Gallbladder surgically absent. Spleen, adrenal glands, and  kidneys are within normal limits. Linear areas of hemosiderin deposition  along the posterior margin of the spleen suggests a prior subcapsular  hematoma.       Impression:          1. Mild peripancreatic inflammation most consistent with mild acute  pancreatitis. No complicating features.  2. No intrahepatic or extrahepatic biliary dilatation. No  choledocholithiasis.     This report was finalized on 8/20/2018 9:23 AM by Dr. José Miguel Ray MD.       CT Abdomen Pelvis With Contrast [[id number]] Collected:  08/19/18 0825     Updated:  08/19/18 0830    Narrative:       CT ABDOMEN PELVIS WITH ORAL AND IV CONTRAST 8/18/2018     HISTORY: Epigastric pain began this morning. HISTORY of pancreatitis.     TECHNIQUE: Axial CT abdomen pelvis with oral and IV contrast and  multiplanar reformats Radiation dose reduction techniques were utilized,  including automated exposure control and exposure modulation based on  body size. 1 CT and / or nuclear cardiology exams in the past 12 months  according to records at this institution.     COMPARISON: CT abdomen pelvis 4/22/2018     LUNG BASES:Unremarkable     ABDOMEN FINDINGS: Is been cholecystectomy. There is mild periportal  edema. There is hazy fat infiltration near the pancreatic head and  uncinate process, suggesting possible pancreatitis. There is no abnormal  fluid collection. The portal and splenic veins are normally patent     The distal pancreas and spleen are normal. The kidneys and adrenal  glands show no acute abnormality. The aorta is normal in caliber and  there is no bowel obstruction.     PELVIS FINDINGS: There is no pelvic mass or inflammatory change or  abnormal collection. There is no hernia or bowel obstruction. The bony  structures are unremarkable.       Impression:       Mild hazy fat infiltration around the proximal pancreas  without  evidence of abnormal fluid collection. Mild hepatic periportal  edema without convincing evidence of biliary obstruction. Question acute  pancreatitis.     This report was finalized on 8/19/2018 8:28 AM by Dr. Javad Grullon MD.       US Abdomen Limited [[id number]] Collected:  08/19/18 0654     Updated:  08/19/18 0658    Narrative:       INDICATION: Prior cholecystectomy, 1 day history of abdominal pain     EXAM: Abdominal ultrasound, limited to the right upper quadrant.     COMPARISON: CT abdomen pelvis dated 4/22/2018     FINDINGS:  Visualized portions of the pancreas are within normal limits.      The echogenicity and echotexture of the hepatic parenchyma is within  normal limits. No hepatic mass. There is minimal prominence of the  intrahepatic biliary tree, unchanged since the CT examination of April 20, 2018. The common duct is normal in size at the nick hepatis.      The gallbladder is surgically absent.     The right kidney is normal in size. Renal cortical thickness and  echogenicity is normal. No hydronephrosis. Small simple renal cyst     No ascites.       Impression:       . Mild prominence of the biliary tree, likely related to prior  cholecystectomy, and unchanged since CT examination of April 20, 2018.     Otherwise Negative right upper quadrant ultrasound.     This report was finalized on 8/19/2018 6:56 AM by Dr. Javad Grullon MD.             WBC   Date Value Ref Range Status   08/20/2018 5.77 4.80 - 10.80 10*3/mm3 Final     RBC   Date Value Ref Range Status   08/20/2018 4.34 4.20 - 5.40 10*6/mm3 Final     Hemoglobin   Date Value Ref Range Status   08/20/2018 12.8 12.0 - 16.0 g/dL Final     Hematocrit   Date Value Ref Range Status   08/20/2018 38.8 37.0 - 47.0 % Final     MCV   Date Value Ref Range Status   08/20/2018 89.4 81.0 - 99.0 fL Final     MCH   Date Value Ref Range Status   08/20/2018 29.5 27.0 - 31.0 pg Final     MCHC   Date Value Ref Range Status   08/20/2018 33.0 31.0 - 37.0 g/dL  Final     RDW   Date Value Ref Range Status   08/20/2018 12.8 11.5 - 14.5 % Final     RDW-SD   Date Value Ref Range Status   08/20/2018 42.4 37.0 - 54.0 fl Final     MPV   Date Value Ref Range Status   08/20/2018 9.4 7.4 - 10.4 fL Final     Platelets   Date Value Ref Range Status   08/20/2018 247 140 - 500 10*3/mm3 Final     Neutrophil %   Date Value Ref Range Status   08/20/2018 59.3 45.0 - 70.0 % Final     Lymphocyte %   Date Value Ref Range Status   08/20/2018 26.5 20.0 - 45.0 % Final     Monocyte %   Date Value Ref Range Status   08/20/2018 9.0 (H) 3.0 - 8.0 % Final     Eosinophil %   Date Value Ref Range Status   08/20/2018 4.0 0.0 - 4.0 % Final     Basophil %   Date Value Ref Range Status   08/20/2018 0.9 0.0 - 2.0 % Final     Immature Grans %   Date Value Ref Range Status   08/20/2018 0.3 0.0 - 0.5 % Final     Neutrophils, Absolute   Date Value Ref Range Status   08/20/2018 3.42 1.50 - 8.30 10*3/mm3 Final     Lymphocytes, Absolute   Date Value Ref Range Status   08/20/2018 1.53 0.60 - 4.80 10*3/mm3 Final     Monocytes, Absolute   Date Value Ref Range Status   08/20/2018 0.52 0.00 - 1.00 10*3/mm3 Final     Eosinophils, Absolute   Date Value Ref Range Status   08/20/2018 0.23 0.10 - 0.30 10*3/mm3 Final     Basophils, Absolute   Date Value Ref Range Status   08/20/2018 0.05 0.00 - 0.20 10*3/mm3 Final     Immature Grans, Absolute   Date Value Ref Range Status   08/20/2018 0.02 0.00 - 0.03 10*3/mm3 Final     nRBC   Date Value Ref Range Status   08/20/2018 0.0 0.0 - 0.0 /100 WBC Final       Glucose   Date Value Ref Range Status   08/20/2018 110 (H) 65 - 99 mg/dL Final     Sodium   Date Value Ref Range Status   08/20/2018 141 136 - 145 mmol/L Final     Potassium   Date Value Ref Range Status   08/20/2018 3.8 3.5 - 5.2 mmol/L Final     CO2   Date Value Ref Range Status   08/20/2018 24.0 22.0 - 29.0 mmol/L Final     Chloride   Date Value Ref Range Status   08/20/2018 103 98 - 107 mmol/L Final     Anion Gap   Date  Value Ref Range Status   08/20/2018 14.0 mmol/L Final     Creatinine   Date Value Ref Range Status   08/20/2018 0.65 0.57 - 1.00 mg/dL Final     BUN   Date Value Ref Range Status   08/20/2018 5 (L) 6 - 20 mg/dL Final     BUN/Creatinine Ratio   Date Value Ref Range Status   08/20/2018 7.7 7.0 - 25.0 Final     Calcium   Date Value Ref Range Status   08/20/2018 8.8 8.6 - 10.5 mg/dL Final     eGFR Non  Amer   Date Value Ref Range Status   08/20/2018 95 >60 mL/min/1.73 Final     Alkaline Phosphatase   Date Value Ref Range Status   08/20/2018 114 40 - 129 U/L Final     Total Protein   Date Value Ref Range Status   08/20/2018 6.5 6.0 - 8.5 g/dL Final     ALT (SGPT)   Date Value Ref Range Status   08/20/2018 352 (H) 5 - 33 U/L Final     AST (SGOT)   Date Value Ref Range Status   08/20/2018 136 (H) 5 - 32 U/L Final     Total Bilirubin   Date Value Ref Range Status   08/20/2018 0.4 0.2 - 1.2 mg/dL Final     Albumin   Date Value Ref Range Status   08/20/2018 3.90 3.50 - 5.20 g/dL Final     Globulin   Date Value Ref Range Status   08/20/2018 2.6 gm/dL Final     Pancreatitis- Negaive CMV/EBV so feel this is prob drug induced and Bactrim is the most probable candidate    OK to D/C and see Dr Brice for OP GI.

## 2018-08-21 ENCOUNTER — READMISSION MANAGEMENT (OUTPATIENT)
Dept: CALL CENTER | Facility: HOSPITAL | Age: 53
End: 2018-08-21

## 2018-08-21 NOTE — PAYOR COMM NOTE
"AntelmoElyssa  (53 y.o. Female)     ATTN: NURSE REVIEWER  AUTH#[id number]  FAXING DISCHARGE INFO ON APPROVED AUTH. THANK YOU.       Date of Birth Social Security Number Address Home Phone MRN    1965  [id number] [facility]  [place] [address] [phone] [id number]    [facility] Marital Status          None        Admission Date Admission Type Admitting Provider Attending Provider Department, Room/Bed    8/18/18 Emergency aBrak Rivera MD  [facility] MED SURG, 1407/1    Discharge Date Discharge Disposition Discharge Destination        8/20/2018 Home or Self Care              Attending Provider:  (none)   Allergies:  Ceftin [Cefuroxime Axetil], Paroxetine, Ciprofloxacin, Metronidazole    Isolation:  None   Infection:  None   Code Status:  Prior    Ht:  162 cm (63.78\")   Wt:  77.1 kg (170 lb 1 oz)    Admission Cmt:  None   Principal Problem:  None                Active Insurance as of 8/18/2018     Primary Coverage     Payor Plan Insurance Group Employer/Plan Group    [facility] [employer] [facility] [employer] [facility] PPO [id number]     Payor Plan Address Payor Plan Phone Number Effective From Effective To    [address] [phone] 8/24/2005     [facility] [id number]       Subscriber Name Subscriber Birth Date Member ID       MADHU BARBOSA [birth year]1962 [id number]                 Emergency Contacts      (Rel.) Home Phone Work Phone Mobile Phone    Madhu Barbosa (Spouse) [phone] -- [phone]               Discharge Summary      Zheng Kowalski DO at 8/20/2018  5:47 PM          ELYSSA BARBOSA  1965  [id number]    Hospitalists Admission/Discharge Summary    Date of Admission: 8/18/2018  Date of Discharge:  8/20/2018    Primary Discharge Diagnoses: Pancreatitis    Secondary Discharge Diagnoses:   HTN  Depression    PCP  Meryl Mesa MD    Consults:   Consults     Date and Time Order Name Status Description    8/19/2018 0030 Inpatient " Gastroenterology Consult            Family History   Problem Relation Age of Onset   • Diabetes Mother    • Anxiety disorder Mother    • Depression Mother    • Diabetes Father    • Heart disease Father         CABG x 3 in early 60s   • Hypertension Father    • Anxiety disorder Sister    • Depression Sister    • Cancer Daughter         Synovial Cell Sarcoma     Past Medical History:   Diagnosis Date   • Colon polyps     c-scope was fall 2014. due for repeat in 2-3 years.   • Depression    • Gastroesophageal reflux disease 2/28/2017   • Headache     More problems when big fluctuations in weather   • History of frequent urinary tract infections    • Humerus fracture    • Hypercholesterolemia     High Triglycerides   • Hypertension    • Pancreatitis    • Plantar fasciitis    • Sleep apnea      Past Surgical History:   Procedure Laterality Date   • APPENDECTOMY     • CHOLECYSTECTOMY     • COLONOSCOPY     • HYSTERECTOMY      Partial   • SINUS SURGERY     • STOMACH SURGERY  12/10/2013    Gastric sleve   • TONSILLECTOMY       Social History     Social History   • Marital status:      Spouse name: N/A   • Number of children: N/A   • Years of education: N/A     Occupational History   • Not on file.     Social History Main Topics   • Smoking status: Never Smoker   • Smokeless tobacco: Not on file   • Alcohol use 0.6 oz/week     1 Glasses of wine per week      Comment: 2 X PER MONTH    • Drug use: No   • Sexual activity: Yes     Partners: Male     Birth control/ protection: Post-menopausal     Other Topics Concern   • Not on file     Social History Narrative   • No narrative on file     Allergies   Allergen Reactions   • Ceftin [Cefuroxime Axetil]    • Paroxetine    • Ciprofloxacin Rash   • Metronidazole Rash          Discharge Medications      New Medications      Instructions Start Date   famotidine 40 MG tablet  Commonly known as:  PEPCID   40 mg, Oral, Daily      losartan 100 MG tablet  Commonly known as:  COZAAR    100 mg, Oral, Every 24 Hours Scheduled         Changes to Medications      Instructions Start Date   ALPRAZolam XR 0.5 MG 24 hr tablet  Commonly known as:  XANAX XR  What changed:  · when to take this  · reasons to take this   0.5 mg, Oral, Daily      azelastine 0.15 % solution nasal spray  Commonly known as:  ASTEPRO  What changed:  · when to take this  · reasons to take this   2 sprays, Nasal, 2 Times Daily         Continue These Medications      Instructions Start Date   albuterol 108 (90 Base) MCG/ACT inhaler  Commonly known as:  PROVENTIL HFA;VENTOLIN HFA   Ventolin  (90 Base) MCG/ACT Inhalation Aerosol Solution; Patient Sig: Ventolin  (90 Base) MCG/ACT Inhalation Aerosol Solution INHALE 2-4 PUFFS Q 4 HOURS PRN/COUGH/WHEEZE; 1; 0; 21-Aug-2015; Active      buPROPion  MG 24 hr tablet  Commonly known as:  WELLBUTRIN XL   150 mg, Oral, Daily      clobetasol 0.05 % ointment  Commonly known as:  TEMOVATE   Topical, 2 Times Daily      DHEA 50 PO   TAKE ONE Capsule BY MOUTH EVERY DAY before noon      estradiol 0.1 MG/GM vaginal cream  Commonly known as:  ESTRACE   1 g, Vaginal, 2 Times Weekly      levothyroxine 50 MCG tablet  Commonly known as:  SYNTHROID, LEVOTHROID   take two  Tablets by mouth every morning on an empty stomach total 100mcg      liothyronine 5 MCG tablet  Commonly known as:  CYTOMEL   take one Tablet by mouth every morning on an empty stomach      PROGESTERONE PO   TAKE ONE Capsule BY MOUTH EVERY NIGHT AT BEDTIME      valACYclovir 1000 MG tablet  Commonly known as:  VALTREX   1,000 mg, Oral, 3 Times Daily      venlafaxine XR 75 MG 24 hr capsule  Commonly known as:  EFFEXOR-XR   75 mg, Oral, Daily      Vitamin D3 58396 units tablet   chew one tablet by mouth every day WITH FOOD         Stop These Medications    losartan-hydrochlorothiazide 100-12.5 MG per tablet  Commonly known as:  HYZAAR     omeprazole 40 MG capsule  Commonly known as:  priLOSEC     predniSONE 10 MG  tablet  Commonly known as:  DELTASONE     sulfamethoxazole-trimethoprim 800-160 MG per tablet  Commonly known as:  BACTRIM DS,SEPTRA DS                  CC: abd pain    History of Present Illness:  Pt a 54 yo female admitted with abd pain found to have pancreatitis    Hospital Course  Acute pancreatitis suspected secondary to medications: GI follow up as outpt  GERD:   MRCP today confirms mild  CMV IgG +, IgM negative  D/C all medications that can cause pancreatitis: effexor/HCTZ/protonix/bactrim  Change PPI to pepcid 40 mg daily, monitor for effect,   GI cleared for d/c tolerating diet at discharge     Hypertension: continue losartan, d/c hctz as above, monitor     Depression/anxiety: no acute issues, d/c effexor as above, continue wellbutrin     Chronic UTI: no acute issues, UA negative for infection     Impaired fasting glucose: A1C 5.8%, no acute issues     LBP: no acute issues on home baclofen    Lab Results (last 72 hours)     Procedure Component Value Units Date/Time    EBV, Chrnic / Active Infection [[id number]]  (Abnormal) Collected:  08/18/18 1624    Specimen:  Blood Updated:  08/20/18 1410     EBV Early Antigen Ab, IgG <9.0 U/mL      Comment:                                  Negative        < 9.0                                   Equivocal  9.0 - 10.9                                   Positive        >10.9        EBV VCA IgG >600.0 (H) U/mL      Comment:                                  Negative        <18.0                                   Equivocal 18.0 - 21.9                                   Positive        >21.9        EBV Nuclear Antigen Ab, IgG 140.0 (H) U/mL      Comment:                                  Negative        <18.0                                   Equivocal 18.0 - 21.9                                   Positive        >21.9        Interpretation Comment     Comment:                EBV Interpretation Chart  Interpretation   EBV-IgM  EA(D)-IgG  VCA-IgG  EBNA-IgG  EBV Seronegative    -         -         -          -  Early Phase         +        -         -          -  Acute Primary       +       +or-       +          -  Infection  Convalescence/Past  -       +or-       +          +  Infection  Reactivated        +or-      +         +          +  Infection         + Antibody Present      - Antibody Absent       Narrative:       Performed at:  [address] [place] [address]  [id number]  : Kalin Suarez PhD, Phone:  [phone]    Tricia-Barr Virus VCA, IgM [[id number]] Collected:  08/18/18 1624    Specimen:  Blood Updated:  08/20/18 1410     EBV VCA IgM <36.0 U/mL      Comment:                                  Negative        <36.0                                   Equivocal 36.0 - 43.9                                   Positive        >43.9       Narrative:       Performed at:  [address] [place] [address]  [id number]  : Kalin Suarez PhD, Phone:  [phone]    Lipase [[id number]]  (Abnormal) Collected:  08/20/18 0741    Specimen:  Blood Updated:  08/20/18 1107     Lipase 81 (H) U/L     Amylase [[id number]]  (Abnormal) Collected:  08/20/18 0741    Specimen:  Blood Updated:  08/20/18 1048     Amylase 118 (H) U/L     CMV IgG IgM [[id number]]  (Abnormal) Collected:  08/18/18 1624    Specimen:  Blood Updated:  08/20/18 0908     CMV IgG >10.00 (H) U/mL      Comment:                                Negative          <0.60                                 Equivocal   0.60 - 0.69                                 Positive          >0.69        CMV IgM <30.0 AU/mL      Comment:                                 Negative         <30.0                                  Equivocal  30.0 - 34.9                                  Positive         >34.9  A positive result is generally indicative of acute  infection, reactivation or persistent IgM production.       Narrative:       Performed at:  [address] [place] [address]  [id number]  Lab  Director: Kalin Suarez PhD, Phone:  [phone]    Comprehensive Metabolic Panel [[id number]]  (Abnormal) Collected:  08/20/18 0741    Specimen:  Blood Updated:  08/20/18 0816     Glucose 110 (H) mg/dL      BUN 5 (L) mg/dL      Creatinine 0.65 mg/dL      Sodium 141 mmol/L      Potassium 3.8 mmol/L      Chloride 103 mmol/L      CO2 24.0 mmol/L      Calcium 8.8 mg/dL      Total Protein 6.5 g/dL      Albumin 3.90 g/dL      ALT (SGPT) 352 (H) U/L      AST (SGOT) 136 (H) U/L      Alkaline Phosphatase 114 U/L      Total Bilirubin 0.4 mg/dL      eGFR Non African Amer 95 mL/min/1.73      Globulin 2.6 gm/dL      A/G Ratio 1.5 g/dL      BUN/Creatinine Ratio 7.7     Anion Gap 14.0 mmol/L     CBC & Differential [[id number]] Collected:  08/20/18 0746    Specimen:  Blood Updated:  08/20/18 0749    Narrative:       The following orders were created for panel order CBC & Differential.  Procedure                               Abnormality         Status                     ---------                               -----------         ------                     CBC Auto Differential[[id number]]        Abnormal            Final result                 Please view results for these tests on the individual orders.    CBC Auto Differential [[id number]]  (Abnormal) Collected:  08/20/18 0746    Specimen:  Blood Updated:  08/20/18 0749     WBC 5.77 10*3/mm3      RBC 4.34 10*6/mm3      Hemoglobin 12.8 g/dL      Hematocrit 38.8 %      MCV 89.4 fL      MCH 29.5 pg      MCHC 33.0 g/dL      RDW 12.8 %      RDW-SD 42.4 fl      MPV 9.4 fL      Platelets 247 10*3/mm3      Neutrophil % 59.3 %      Lymphocyte % 26.5 %      Monocyte % 9.0 (H) %      Eosinophil % 4.0 %      Basophil % 0.9 %      Immature Grans % 0.3 %      Neutrophils, Absolute 3.42 10*3/mm3      Lymphocytes, Absolute 1.53 10*3/mm3      Monocytes, Absolute 0.52 10*3/mm3      Eosinophils, Absolute 0.23 10*3/mm3      Basophils, Absolute 0.05 10*3/mm3      Immature Grans, Absolute 0.02  10*3/mm3      nRBC 0.0 /100 WBC     Hepatitis Panel, Acute [[id number]]  (Normal) Collected:  08/18/18 1105    Specimen:  Blood Updated:  08/19/18 0912     Hepatitis B Surface Ag Non-Reactive     Hep A IgM Non-Reactive     Hep B C IgM Non-Reactive     Hepatitis C Ab Non-Reactive    Comprehensive Metabolic Panel [[id number]]  (Abnormal) Collected:  08/19/18 0412    Specimen:  Blood Updated:  08/19/18 0519     Glucose 90 mg/dL      BUN 8 mg/dL      Creatinine 0.70 mg/dL      Sodium 143 mmol/L      Potassium 3.9 mmol/L      Chloride 107 mmol/L      CO2 24.1 mmol/L      Calcium 8.3 (L) mg/dL      Total Protein 5.5 (L) g/dL      Albumin 3.30 (L) g/dL      ALT (SGPT) 468 (H) U/L      AST (SGOT) 366 (H) U/L      Alkaline Phosphatase 105 U/L      Total Bilirubin 0.9 mg/dL      eGFR Non African Amer 87 mL/min/1.73      Globulin 2.2 gm/dL      A/G Ratio 1.5 g/dL      BUN/Creatinine Ratio 11.4     Anion Gap 11.9 mmol/L     CBC & Differential [[id number]] Collected:  08/19/18 0412    Specimen:  Blood Updated:  08/19/18 0436    Narrative:       The following orders were created for panel order CBC & Differential.  Procedure                               Abnormality         Status                     ---------                               -----------         ------                     CBC Auto Differential[[id number]]        Abnormal            Final result                 Please view results for these tests on the individual orders.    CBC Auto Differential [[id number]]  (Abnormal) Collected:  08/19/18 0412    Specimen:  Blood Updated:  08/19/18 0436     WBC 3.94 (L) 10*3/mm3      RBC 3.81 (L) 10*6/mm3      Hemoglobin 11.3 (L) g/dL      Hematocrit 34.6 (L) %      MCV 90.8 fL      MCH 29.7 pg      MCHC 32.7 g/dL      RDW 13.1 %      RDW-SD 42.9 fl      MPV 9.7 fL      Platelets 178 10*3/mm3      Neutrophil % 54.3 %      Lymphocyte % 32.2 %      Monocyte % 8.9 (H) %      Eosinophil % 3.3 %      Basophil % 1.0 %      Immature Grans %  0.3 %      Neutrophils, Absolute 2.14 10*3/mm3      Lymphocytes, Absolute 1.27 10*3/mm3      Monocytes, Absolute 0.35 10*3/mm3      Eosinophils, Absolute 0.13 10*3/mm3      Basophils, Absolute 0.04 10*3/mm3      Immature Grans, Absolute 0.01 10*3/mm3      nRBC 0.0 /100 WBC     Lipid Panel [[id number]] Collected:  08/18/18 1105    Specimen:  Blood Updated:  08/18/18 1126     Total Cholesterol 129 mg/dL      Triglycerides 70 mg/dL      HDL Cholesterol 55 mg/dL      LDL Cholesterol  60 mg/dL      VLDL Cholesterol 14 mg/dL      LDL/HDL Ratio 1.09    Hemoglobin A1c [[id number]]  (Abnormal) Collected:  08/18/18 1105    Specimen:  Blood Updated:  08/18/18 1125     Hemoglobin A1C 5.80 (H) %     Narrative:       Hemoglobin A1C Ranges:    Increased Risk for Diabetes  5.7% to 6.4%  Diabetes                     >= 6.5%  Diabetic Goal                < 7.0%    Lipase [[id number]]  (Abnormal) Collected:  08/18/18 0609    Specimen:  Blood Updated:  08/18/18 0702     Lipase 1,042 (H) U/L     Comprehensive Metabolic Panel [[id number]]  (Abnormal) Collected:  08/18/18 0609    Specimen:  Blood Updated:  08/18/18 0702     Glucose 115 (H) mg/dL      BUN 14 mg/dL      Creatinine 0.88 mg/dL      Sodium 140 mmol/L      Potassium 4.1 mmol/L      Chloride 107 mmol/L      CO2 24.2 mmol/L      Calcium 8.1 (L) mg/dL      Total Protein 5.4 (L) g/dL      Albumin 3.60 g/dL      ALT (SGPT) 335 (H) U/L      AST (SGOT) 662 (H) U/L      Alkaline Phosphatase 81 U/L      Total Bilirubin 0.5 mg/dL      eGFR Non African Amer 67 mL/min/1.73      Globulin 1.8 gm/dL      A/G Ratio 2.0 g/dL      BUN/Creatinine Ratio 15.9     Anion Gap 8.8 mmol/L     Amylase [[id number]]  (Abnormal) Collected:  08/18/18 0609    Specimen:  Blood Updated:  08/18/18 0658     Amylase 641 (H) U/L     Lactic Acid, Plasma [[id number]]  (Normal) Collected:  08/18/18 0609    Specimen:  Blood Updated:  08/18/18 0652     Lactate 1.3 mmol/L     Lipase [[id number]]  (Abnormal) Collected:  08/18/18  0134    Specimen:  Blood Updated:  08/18/18 0204     Lipase 1,110 (H) U/L     Urinalysis With Microscopic If Indicated (No Culture) - Urine, Clean Catch [[id number]]  (Normal) Collected:  08/18/18 0133    Specimen:  Urine from Urine, Clean Catch Updated:  08/18/18 0203     Color, UA Yellow     Appearance, UA Clear     pH, UA 7.0     Specific Gravity, UA 1.015     Glucose, UA Negative     Ketones, UA Negative     Bilirubin, UA Negative     Blood, UA Negative     Protein, UA Negative     Leuk Esterase, UA Negative     Nitrite, UA Negative     Urobilinogen, UA 0.2 E.U./dL    Narrative:       Urine microscopic not indicated.    Comprehensive Metabolic Panel [[id number]]  (Abnormal) Collected:  08/18/18 0134    Specimen:  Blood Updated:  08/18/18 0202     Glucose 105 (H) mg/dL      BUN 17 mg/dL      Creatinine 1.02 (H) mg/dL      Sodium 140 mmol/L      Potassium 4.0 mmol/L      Chloride 102 mmol/L      CO2 24.2 mmol/L      Calcium 9.4 mg/dL      Total Protein 6.7 g/dL      Albumin 4.40 g/dL      ALT (SGPT) 44 (H) U/L      AST (SGOT) 95 (H) U/L      Alkaline Phosphatase 76 U/L      Total Bilirubin 0.3 mg/dL      eGFR Non African Amer 56 (L) mL/min/1.73      Globulin 2.3 gm/dL      A/G Ratio 1.9 g/dL      BUN/Creatinine Ratio 16.7     Anion Gap 13.8 mmol/L     CBC & Differential [[id number]] Collected:  08/18/18 0134    Specimen:  Blood Updated:  08/18/18 0142    Narrative:       The following orders were created for panel order CBC & Differential.  Procedure                               Abnormality         Status                     ---------                               -----------         ------                     CBC Auto Differential[[id number]]        Abnormal            Final result                 Please view results for these tests on the individual orders.    CBC Auto Differential [[id number]]  (Abnormal) Collected:  08/18/18 0134    Specimen:  Blood Updated:  08/18/18 0142     WBC 8.75 10*3/mm3      RBC 4.35  10*6/mm3      Hemoglobin 12.9 g/dL      Hematocrit 39.1 %      MCV 89.9 fL      MCH 29.7 pg      MCHC 33.0 g/dL      RDW 13.1 %      RDW-SD 42.8 fl      MPV 9.5 fL      Platelets 256 10*3/mm3      Neutrophil % 56.2 %      Lymphocyte % 32.5 %      Monocyte % 8.7 (H) %      Eosinophil % 1.8 %      Basophil % 0.6 %      Immature Grans % 0.2 %      Neutrophils, Absolute 4.92 10*3/mm3      Lymphocytes, Absolute 2.84 10*3/mm3      Monocytes, Absolute 0.76 10*3/mm3      Eosinophils, Absolute 0.16 10*3/mm3      Basophils, Absolute 0.05 10*3/mm3      Immature Grans, Absolute 0.02 10*3/mm3      nRBC 0.0 /100 WBC         Imaging Results (most recent)     Procedure Component Value Units Date/Time    MRI abdomen w wo contrast mrcp [[id number]] Collected:  08/20/18 0914     Updated:  08/20/18 0926    Narrative:       MRI ABDOMEN W WO CONTRAST MRCP-: 8/20/2018 7:45 AM     INDICATION:    Chronic epigastric pain. Pancreatitis.     TECHNIQUE:   MRI of the abdomen with and without contrast. Thin and thick slab MRCP  sequences were obtained.     COMPARISON:    CT abdomen pelvis 8/18/2018.     FINDINGS:  Mild peripancreatic inflammation suggesting acute pancreatitis. This is  similar to the 8/18/2018 comparison, when taking into account  differences in technique. There is no acute loculated peripancreatic  fluid collections. Pancreatic duct is normal in caliber. No suspicious  pancreatic mass. There is a small cystic lesion in the pancreatic tail  measuring 0.6 cm. In retrospect, this is stable from at least 2014 and  considered benign. The peripancreatic vasculature is patent.     Liver is within normal. No intrahepatic or intrahepatic biliary  dilatation. Gallbladder surgically absent. Spleen, adrenal glands, and  kidneys are within normal limits. Linear areas of hemosiderin deposition  along the posterior margin of the spleen suggests a prior subcapsular  hematoma.       Impression:          1. Mild peripancreatic inflammation most  consistent with mild acute  pancreatitis. No complicating features.  2. No intrahepatic or extrahepatic biliary dilatation. No  choledocholithiasis.     This report was finalized on 8/20/2018 9:23 AM by Dr. José Miguel Ray MD.       CT Abdomen Pelvis With Contrast [[id number]] Collected:  08/19/18 0825     Updated:  08/19/18 0830    Narrative:       CT ABDOMEN PELVIS WITH ORAL AND IV CONTRAST 8/18/2018     HISTORY: Epigastric pain began this morning. HISTORY of pancreatitis.     TECHNIQUE: Axial CT abdomen pelvis with oral and IV contrast and  multiplanar reformats Radiation dose reduction techniques were utilized,  including automated exposure control and exposure modulation based on  body size. 1 CT and / or nuclear cardiology exams in the past 12 months  according to records at this institution.     COMPARISON: CT abdomen pelvis 4/22/2018     LUNG BASES:Unremarkable     ABDOMEN FINDINGS: Is been cholecystectomy. There is mild periportal  edema. There is hazy fat infiltration near the pancreatic head and  uncinate process, suggesting possible pancreatitis. There is no abnormal  fluid collection. The portal and splenic veins are normally patent     The distal pancreas and spleen are normal. The kidneys and adrenal  glands show no acute abnormality. The aorta is normal in caliber and  there is no bowel obstruction.     PELVIS FINDINGS: There is no pelvic mass or inflammatory change or  abnormal collection. There is no hernia or bowel obstruction. The bony  structures are unremarkable.       Impression:       Mild hazy fat infiltration around the proximal pancreas  without evidence of abnormal fluid collection. Mild hepatic periportal  edema without convincing evidence of biliary obstruction. Question acute  pancreatitis.     This report was finalized on 8/19/2018 8:28 AM by Dr. Javad Grullon MD.       US Abdomen Limited [[id number]] Collected:  08/19/18 0654     Updated:  08/19/18 0658    Narrative:       INDICATION:  Prior cholecystectomy, 1 day history of abdominal pain     EXAM: Abdominal ultrasound, limited to the right upper quadrant.     COMPARISON: CT abdomen pelvis dated 4/22/2018     FINDINGS:  Visualized portions of the pancreas are within normal limits.      The echogenicity and echotexture of the hepatic parenchyma is within  normal limits. No hepatic mass. There is minimal prominence of the  intrahepatic biliary tree, unchanged since the CT examination of April 20, 2018. The common duct is normal in size at the nick hepatis.      The gallbladder is surgically absent.     The right kidney is normal in size. Renal cortical thickness and  echogenicity is normal. No hydronephrosis. Small simple renal cyst     No ascites.       Impression:       . Mild prominence of the biliary tree, likely related to prior  cholecystectomy, and unchanged since CT examination of April 20, 2018.     Otherwise Negative right upper quadrant ultrasound.     This report was finalized on 8/19/2018 6:56 AM by Dr. Javad Grullon MD.               PROCEDURES      Condition on Discharge:  stable    Physical Exam at Discharge  Temp:  [97 °F (36.1 °C)-98.8 °F (37.1 °C)] 97.7 °F (36.5 °C)  Heart Rate:  [71-97] 79  Resp:  [18] 18  BP: (118-141)/(61-91) 141/91    Physical Exam:  Physical Exam   Constitutional: Patient appears well-developed and well-nourished and in no acute distress   HEENT:   Head: Normocephalic and atraumatic.   Eyes:  Pupils are equal, round, and reactive to light. EOM are intact. Sclera are anicteric and non-injected.  Mouth and Throat: Patient has moist mucous membranes. Oropharynx is clear of any erythema or exudate.     Neck: Neck supple. No JVD present. No thyromegaly present. No lymphadenopathy present.  Cardiovascular: Regular rate, regular rhythm, S1 normal and S2 normal.  Exam reveals no gallop and no friction rub.  No murmur heard.  Pulmonary/Chest: Lungs are clear to auscultation bilaterally. No respiratory distress.  No wheezes. No rhonchi. No rales.   Abdominal: Soft. Bowel sounds are normal. No distension and no mass. There is no hepatosplenomegaly. There is no tenderness.   Musculoskeletal: Normal Muscle tone  Extremities: No edema. Pulses are palpable in all 4 extremities.  Neurological: Patient is alert and oriented to person, place, and time. Cranial nerves II-XII are grossly intact with no focal deficits.  Skin: Skin is warm. No rash noted. Nails show no clubbing.  No cyanosis or erythema.    Discharge Disposition  Home with family            Discharge Diet:    Dietary Orders     Start     Ordered    08/20/18 1215  Diet Full Liquid; GI Soft / [place]  Diet Effective Now     Question Answer Comment   Diet Texture / Consistency Full Liquid    Common Modifiers GI Soft / [place]        08/20/18 1214          Activity at Discharge:  As tolerated          Follow-up Appointments  Future Appointments  Date Time Provider Department Center   10/15/2018 9:15 AM Mei Padilla MD [facility] None     Additional Instructions for the Follow-ups that You Need to Schedule     Discharge Follow-up with PCP    As directed      Currently Documented PCP:  Meryl Mesa MD  PCP Phone Number:  [phone]    Follow Up Details:  one week         Discharge Follow-up with Specified Provider: Gastroenterology Dr Chavez; 2 Weeks    As directed      To:  Gastroenterology Dr Chavez    Follow Up:  2 Weeks             No future appointments.      Test Results Pending at Discharge       Zheng Kowalski DO  08/20/18  5:47 PM    Time:30                Electronically signed by Zheng Kowalski DO at 8/20/2018  5:50 PM       Discharge Order     Start     Ordered    08/20/18 1746  Discharge patient  Once     Expected Discharge Date:  08/20/18    Expected Discharge Time:  Afternoon    Discharge Disposition:  Home or Self Care    Physician of Record for Attribution - Please select from Treatment Team:  LYDIA LEMON [[id number]]     Review needed by CMO to determine Physician of Record:  No       Question Answer Comment   Physician of Record for Attribution - Please select from Treatment Team LYDIA LEMON    Review needed by CMO to determine Physician of Record No        08/20/18 3114

## 2018-08-21 NOTE — OUTREACH NOTE
Prep Survey      Responses   Facility patient discharged from?  [facility]   Is LACE score < 7 ?  Yes   Is patient eligible?  Yes   Discharge diagnosis  Acute Pancreatitis r/t meds   Does the patient have one of the following disease processes/diagnoses(primary or secondary)?  Other   Does the patient have Home health ordered?  No   Is there a DME ordered?  No   Prep survey completed?  Yes          Marcella Sevilla RN

## 2018-08-23 ENCOUNTER — READMISSION MANAGEMENT (OUTPATIENT)
Dept: CALL CENTER | Facility: HOSPITAL | Age: 53
End: 2018-08-23

## 2018-08-23 NOTE — OUTREACH NOTE
LAG < 7 Survey      Responses   Facility patient discharged from?  [facility]   Does the patient have one of the following disease processes/diagnoses(primary or secondary)?  Other   Is there a successful TCM telephone encounter documented?  No   [facility]AG <7 Attempt successful?  Yes   Call start time  1042   Call end time  1047   Discharge diagnosis  Acute Pancreatitis r/t meds   Is patient permission given to speak with other caregiver?  Yes   Person spoke with today (if not patient) and relationship  Heamnth her    Meds reviewed with patient/caregiver?  Yes   Is the patient having any side effects they believe may be caused by any medication additions or changes?  No   Does the patient have all medications ordered at discharge?  Yes   Is the patient taking all medications as directed (includes completed medication regime)?  Yes   Comments regarding appointments  Reviewed appt with Dr Padilla on 10/15   Does the patient have a primary care provider?   Yes   Does the patient have an appointment with their PCP within 7 days of discharge?  No   Comments regarding PCP  PCP- Dr Mesa- reminded  to make followup appt within a week of discharge.    What is preventing the patient from scheduling follow up appointments within 7 days of discharge?  Haven't had time   Nursing Interventions  Verified appointment date/time/provider, Educated patient on importance of making appointment, Advised patient to make appointment   Has the patient kept scheduled appointments due by today?  Yes   Has home health visited the patient within 72 hours of discharge?  N/A   Psychosocial issues?  No   Comments   reports paint is doing well. Free of Abdominal pain, nausea and vomiting. No fever.   Did the patient receive a copy of their discharge instructions?  Yes   Nursing interventions  Reviewed instructions with patient   What is the patient's perception of their health status since discharge?  Improving   Is the  patient/caregiver able to teach back signs and symptoms related to disease process for when to call PCP?  Yes   Is the patient/caregiver able to teach back signs and symptoms related to disease process for when to call 911?  Yes   Is the patient/caregiver able to teach back the hierarchy of who to call/visit for symptoms/problems? PCP, Specialist, Home health nurse, Urgent Care, ED, 911  Yes   Graduated  Yes          Fadi Acosta RN

## 2018-09-20 RX ORDER — LOSARTAN POTASSIUM 100 MG/1
TABLET ORAL
Qty: 90 TABLET | Refills: 0 | Status: SHIPPED | OUTPATIENT
Start: 2018-09-20 | End: 2018-09-28

## 2018-09-28 ENCOUNTER — OFFICE VISIT (OUTPATIENT)
Dept: INTERNAL MEDICINE | Facility: CLINIC | Age: 53
End: 2018-09-28

## 2018-09-28 VITALS
BODY MASS INDEX: 29.3 KG/M2 | HEART RATE: 90 BPM | SYSTOLIC BLOOD PRESSURE: 134 MMHG | DIASTOLIC BLOOD PRESSURE: 74 MMHG | WEIGHT: 165.4 LBS | HEIGHT: 63 IN | OXYGEN SATURATION: 98 %

## 2018-09-28 DIAGNOSIS — I10 ESSENTIAL HYPERTENSION: ICD-10-CM

## 2018-09-28 DIAGNOSIS — Z23 NEEDS FLU SHOT: Primary | ICD-10-CM

## 2018-09-28 DIAGNOSIS — K85.90 ACUTE PANCREATITIS, UNSPECIFIED COMPLICATION STATUS, UNSPECIFIED PANCREATITIS TYPE: ICD-10-CM

## 2018-09-28 PROCEDURE — 90471 IMMUNIZATION ADMIN: CPT | Performed by: INTERNAL MEDICINE

## 2018-09-28 PROCEDURE — 99214 OFFICE O/P EST MOD 30 MIN: CPT | Performed by: INTERNAL MEDICINE

## 2018-09-28 PROCEDURE — 90686 IIV4 VACC NO PRSV 0.5 ML IM: CPT | Performed by: INTERNAL MEDICINE

## 2018-09-28 RX ORDER — LEVOTHYROXINE SODIUM 0.07 MG/1
75 TABLET ORAL DAILY
COMMUNITY
End: 2018-10-15 | Stop reason: DRUGHIGH

## 2018-09-28 RX ORDER — VENLAFAXINE 75 MG/1
75 TABLET ORAL DAILY
COMMUNITY
End: 2019-10-30

## 2018-09-28 RX ORDER — LOSARTAN POTASSIUM AND HYDROCHLOROTHIAZIDE 12.5; 1 MG/1; MG/1
1 TABLET ORAL DAILY
Qty: 90 TABLET | Refills: 3 | Status: SHIPPED | OUTPATIENT
Start: 2018-09-28 | End: 2019-10-30 | Stop reason: SDUPTHER

## 2018-09-28 NOTE — PROGRESS NOTES
Neville Rodriges is a 53 y.o. female, who presents with a chief complaint of   Chief Complaint   Patient presents with   • Follow-up   • Pancreatitis     hospitalized for three days. Has been taken off bp meds. Has been put on Losartan with potassium.        HPI   Pt here for follow up.  She was in the hospital with pancreatitis.  She had multiple meds stopped in the hospital.  There was concern that her losartan-hctz caused her pancreatitis.  Her bp went up at home and she ended up restarting the losartan-hctz.  She has not had any issues since she restarted the meds.  She has not had changes in diet and no real excessively fatty meals.  She rarely drinks etoh bc it doesn't agree with her since she had a gastric sleeve in 2013.  She has had her gallbladder removed previously.  No stones.  MRCP in hospital neg.  She would like  Refill of her losartan-hctz.  No ha/dizziness.  No n/v/d.     She has been [facility] bc of her depression.  She has been on synthroid and cytomel from them.  She is also on progesterone to help with her sleep.  She wants to know what I think about these meds.      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  Baclofen helps and she would like a prescription for this. She has been having massages to help some too.        The following portions of the patient's history were reviewed and updated as appropriate: allergies, current medications, past family history, past medical history, past social history, past surgical history and problem list.    Allergies: Ceftin [cefuroxime axetil]; Paroxetine; Ciprofloxacin; and Metronidazole    Review of Systems   Constitutional: Negative.    HENT: Negative.    Eyes: Negative.    Respiratory: Negative.    Cardiovascular: Negative.    Gastrointestinal: Negative.  Negative for abdominal pain, diarrhea, nausea and vomiting.   Endocrine: Negative.    Genitourinary: Negative.    Musculoskeletal:        Muscle spasm   Skin: Negative.     Allergic/Immunologic: Negative.    Neurological: Negative.    Hematological: Negative.    Psychiatric/Behavioral: Negative.    All other systems reviewed and are negative.            Wt Readings from Last 3 Encounters:   09/28/18 75 kg (165 lb 6.4 oz)   08/18/18 77.1 kg (170 lb 1 oz)   08/20/18 77.1 kg (170 lb)     Temp Readings from Last 3 Encounters:   08/20/18 97.7 °F (36.5 °C) (Oral)   06/22/18 98.2 °F (36.8 °C) (Oral)   04/21/18 97.9 °F (36.6 °C) (Oral)     BP Readings from Last 3 Encounters:   09/28/18 134/74   08/20/18 141/91   07/12/18 112/70     Pulse Readings from Last 3 Encounters:   09/28/18 90   08/20/18 79   06/22/18 93     Body mass index is 29.3 kg/m².  @LASTSAO2(3)@    Physical Exam   Constitutional: She is oriented to person, place, and time. She appears well-developed and well-nourished. No distress.   HENT:   Head: Normocephalic and atraumatic.   Right Ear: External ear normal.   Left Ear: External ear normal.   Nose: Nose normal.   Mouth/Throat: Oropharynx is clear and moist.   Eyes: Pupils are equal, round, and reactive to light. Conjunctivae and EOM are normal.   Neck: Normal range of motion. Neck supple.   Cardiovascular: Normal rate, regular rhythm, normal heart sounds and intact distal pulses.    Pulmonary/Chest: Effort normal and breath sounds normal. No respiratory distress. She has no wheezes.   Musculoskeletal: Normal range of motion.   Normal gait   Neurological: She is alert and oriented to person, place, and time.   Skin: Skin is warm and dry.   Psychiatric: She has a normal mood and affect. Her behavior is normal. Judgment and thought content normal.   Nursing note and vitals reviewed.      Results for orders placed or performed during the hospital encounter of 08/18/18   Comprehensive Metabolic Panel   Result Value Ref Range    Glucose 105 (H) 65 - 99 mg/dL    BUN 17 6 - 20 mg/dL    Creatinine 1.02 (H) 0.57 - 1.00 mg/dL    Sodium 140 136 - 145 mmol/L    Potassium 4.0 3.5 - 5.2  mmol/L    Chloride 102 98 - 107 mmol/L    CO2 24.2 22.0 - 29.0 mmol/L    Calcium 9.4 8.6 - 10.5 mg/dL    Total Protein 6.7 6.0 - 8.5 g/dL    Albumin 4.40 3.50 - 5.20 g/dL    ALT (SGPT) 44 (H) 5 - 33 U/L    AST (SGOT) 95 (H) 5 - 32 U/L    Alkaline Phosphatase 76 40 - 129 U/L    Total Bilirubin 0.3 0.2 - 1.2 mg/dL    eGFR Non African Amer 56 (L) >60 mL/min/1.73    Globulin 2.3 gm/dL    A/G Ratio 1.9 g/dL    BUN/Creatinine Ratio 16.7 7.0 - 25.0    Anion Gap 13.8 mmol/L   Lipase   Result Value Ref Range    Lipase 1,110 (H) 13 - 60 U/L   Urinalysis With Microscopic If Indicated (No Culture) - Urine, Clean Catch   Result Value Ref Range    Color, UA Yellow Yellow, Straw    Appearance, UA Clear Clear    pH, UA 7.0 4.5 - 8.0    Specific Gravity, UA 1.015 1.003 - 1.030    Glucose, UA Negative Negative    Ketones, UA Negative Negative, 80 mg/dL (3+), >=160 mg/dL (4+)    Bilirubin, UA Negative Negative    Blood, UA Negative Negative    Protein, UA Negative Negative    Leuk Esterase, UA Negative Negative    Nitrite, UA Negative Negative    Urobilinogen, UA 0.2 E.U./dL 0.2 - 1.0 E.U./dL   CBC Auto Differential   Result Value Ref Range    WBC 8.75 4.80 - 10.80 10*3/mm3    RBC 4.35 4.20 - 5.40 10*6/mm3    Hemoglobin 12.9 12.0 - 16.0 g/dL    Hematocrit 39.1 37.0 - 47.0 %    MCV 89.9 81.0 - 99.0 fL    MCH 29.7 27.0 - 31.0 pg    MCHC 33.0 31.0 - 37.0 g/dL    RDW 13.1 11.5 - 14.5 %    RDW-SD 42.8 37.0 - 54.0 fl    MPV 9.5 7.4 - 10.4 fL    Platelets 256 140 - 500 10*3/mm3    Neutrophil % 56.2 45.0 - 70.0 %    Lymphocyte % 32.5 20.0 - 45.0 %    Monocyte % 8.7 (H) 3.0 - 8.0 %    Eosinophil % 1.8 0.0 - 4.0 %    Basophil % 0.6 0.0 - 2.0 %    Immature Grans % 0.2 0.0 - 0.5 %    Neutrophils, Absolute 4.92 1.50 - 8.30 10*3/mm3    Lymphocytes, Absolute 2.84 0.60 - 4.80 10*3/mm3    Monocytes, Absolute 0.76 0.00 - 1.00 10*3/mm3    Eosinophils, Absolute 0.16 0.10 - 0.30 10*3/mm3    Basophils, Absolute 0.05 0.00 - 0.20 10*3/mm3    Immature Grans,  Absolute 0.02 0.00 - 0.03 10*3/mm3    nRBC 0.0 0.0 - 0.0 /100 WBC   Amylase   Result Value Ref Range    Amylase 641 (H) 28 - 100 U/L   Lipase   Result Value Ref Range    Lipase 1,042 (H) 13 - 60 U/L   Comprehensive Metabolic Panel   Result Value Ref Range    Glucose 115 (H) 65 - 99 mg/dL    BUN 14 6 - 20 mg/dL    Creatinine 0.88 0.57 - 1.00 mg/dL    Sodium 140 136 - 145 mmol/L    Potassium 4.1 3.5 - 5.2 mmol/L    Chloride 107 98 - 107 mmol/L    CO2 24.2 22.0 - 29.0 mmol/L    Calcium 8.1 (L) 8.6 - 10.5 mg/dL    Total Protein 5.4 (L) 6.0 - 8.5 g/dL    Albumin 3.60 3.50 - 5.20 g/dL    ALT (SGPT) 335 (H) 5 - 33 U/L    AST (SGOT) 662 (H) 5 - 32 U/L    Alkaline Phosphatase 81 40 - 129 U/L    Total Bilirubin 0.5 0.2 - 1.2 mg/dL    eGFR Non African Amer 67 >60 mL/min/1.73    Globulin 1.8 gm/dL    A/G Ratio 2.0 g/dL    BUN/Creatinine Ratio 15.9 7.0 - 25.0    Anion Gap 8.8 mmol/L   Lactic Acid, Plasma   Result Value Ref Range    Lactate 1.3 0.5 - 2.0 mmol/L   Lipid Panel   Result Value Ref Range    Total Cholesterol 129 0 - 200 mg/dL    Triglycerides 70 0 - 150 mg/dL    HDL Cholesterol 55 40 - 60 mg/dL    LDL Cholesterol  60 0 - 100 mg/dL    VLDL Cholesterol 14 7 - 27 mg/dL    LDL/HDL Ratio 1.09    Hepatitis Panel, Acute   Result Value Ref Range    Hepatitis B Surface Ag Non-Reactive Non-Reactive    Hep A IgM Non-Reactive Non-Reactive    Hep B C IgM Non-Reactive Non-Reactive    Hepatitis C Ab Non-Reactive Non-Reactive   Hemoglobin A1c   Result Value Ref Range    Hemoglobin A1C 5.80 (H) 4.80 - 5.60 %   CMV IgG IgM   Result Value Ref Range    CMV IgG >10.00 (H) 0.00 - 0.59 U/mL    CMV IgM <30.0 0.0 - 29.9 AU/mL   EBV, Chrnic / Active Infection   Result Value Ref Range    EBV Early Antigen Ab, IgG <9.0 0.0 - 8.9 U/mL    EBV VCA IgG >600.0 (H) 0.0 - 17.9 U/mL    EBV Nuclear Antigen Ab, IgG 140.0 (H) 0.0 - 17.9 U/mL    Interpretation Comment    Tricia-Barr Virus VCA, IgM   Result Value Ref Range    EBV VCA IgM <36.0 0.0 - 35.9  U/mL   Comprehensive Metabolic Panel   Result Value Ref Range    Glucose 90 65 - 99 mg/dL    BUN 8 6 - 20 mg/dL    Creatinine 0.70 0.57 - 1.00 mg/dL    Sodium 143 136 - 145 mmol/L    Potassium 3.9 3.5 - 5.2 mmol/L    Chloride 107 98 - 107 mmol/L    CO2 24.1 22.0 - 29.0 mmol/L    Calcium 8.3 (L) 8.6 - 10.5 mg/dL    Total Protein 5.5 (L) 6.0 - 8.5 g/dL    Albumin 3.30 (L) 3.50 - 5.20 g/dL    ALT (SGPT) 468 (H) 5 - 33 U/L    AST (SGOT) 366 (H) 5 - 32 U/L    Alkaline Phosphatase 105 40 - 129 U/L    Total Bilirubin 0.9 0.2 - 1.2 mg/dL    eGFR Non African Amer 87 >60 mL/min/1.73    Globulin 2.2 gm/dL    A/G Ratio 1.5 g/dL    BUN/Creatinine Ratio 11.4 7.0 - 25.0    Anion Gap 11.9 mmol/L   CBC Auto Differential   Result Value Ref Range    WBC 3.94 (L) 4.80 - 10.80 10*3/mm3    RBC 3.81 (L) 4.20 - 5.40 10*6/mm3    Hemoglobin 11.3 (L) 12.0 - 16.0 g/dL    Hematocrit 34.6 (L) 37.0 - 47.0 %    MCV 90.8 81.0 - 99.0 fL    MCH 29.7 27.0 - 31.0 pg    MCHC 32.7 31.0 - 37.0 g/dL    RDW 13.1 11.5 - 14.5 %    RDW-SD 42.9 37.0 - 54.0 fl    MPV 9.7 7.4 - 10.4 fL    Platelets 178 140 - 500 10*3/mm3    Neutrophil % 54.3 45.0 - 70.0 %    Lymphocyte % 32.2 20.0 - 45.0 %    Monocyte % 8.9 (H) 3.0 - 8.0 %    Eosinophil % 3.3 0.0 - 4.0 %    Basophil % 1.0 0.0 - 2.0 %    Immature Grans % 0.3 0.0 - 0.5 %    Neutrophils, Absolute 2.14 1.50 - 8.30 10*3/mm3    Lymphocytes, Absolute 1.27 0.60 - 4.80 10*3/mm3    Monocytes, Absolute 0.35 0.00 - 1.00 10*3/mm3    Eosinophils, Absolute 0.13 0.10 - 0.30 10*3/mm3    Basophils, Absolute 0.04 0.00 - 0.20 10*3/mm3    Immature Grans, Absolute 0.01 0.00 - 0.03 10*3/mm3    nRBC 0.0 0.0 - 0.0 /100 WBC   Comprehensive Metabolic Panel   Result Value Ref Range    Glucose 110 (H) 65 - 99 mg/dL    BUN 5 (L) 6 - 20 mg/dL    Creatinine 0.65 0.57 - 1.00 mg/dL    Sodium 141 136 - 145 mmol/L    Potassium 3.8 3.5 - 5.2 mmol/L    Chloride 103 98 - 107 mmol/L    CO2 24.0 22.0 - 29.0 mmol/L    Calcium 8.8 8.6 - 10.5 mg/dL     Total Protein 6.5 6.0 - 8.5 g/dL    Albumin 3.90 3.50 - 5.20 g/dL    ALT (SGPT) 352 (H) 5 - 33 U/L    AST (SGOT) 136 (H) 5 - 32 U/L    Alkaline Phosphatase 114 40 - 129 U/L    Total Bilirubin 0.4 0.2 - 1.2 mg/dL    eGFR Non African Amer 95 >60 mL/min/1.73    Globulin 2.6 gm/dL    A/G Ratio 1.5 g/dL    BUN/Creatinine Ratio 7.7 7.0 - 25.0    Anion Gap 14.0 mmol/L   CBC Auto Differential   Result Value Ref Range    WBC 5.77 4.80 - 10.80 10*3/mm3    RBC 4.34 4.20 - 5.40 10*6/mm3    Hemoglobin 12.8 12.0 - 16.0 g/dL    Hematocrit 38.8 37.0 - 47.0 %    MCV 89.4 81.0 - 99.0 fL    MCH 29.5 27.0 - 31.0 pg    MCHC 33.0 31.0 - 37.0 g/dL    RDW 12.8 11.5 - 14.5 %    RDW-SD 42.4 37.0 - 54.0 fl    MPV 9.4 7.4 - 10.4 fL    Platelets 247 140 - 500 10*3/mm3    Neutrophil % 59.3 45.0 - 70.0 %    Lymphocyte % 26.5 20.0 - 45.0 %    Monocyte % 9.0 (H) 3.0 - 8.0 %    Eosinophil % 4.0 0.0 - 4.0 %    Basophil % 0.9 0.0 - 2.0 %    Immature Grans % 0.3 0.0 - 0.5 %    Neutrophils, Absolute 3.42 1.50 - 8.30 10*3/mm3    Lymphocytes, Absolute 1.53 0.60 - 4.80 10*3/mm3    Monocytes, Absolute 0.52 0.00 - 1.00 10*3/mm3    Eosinophils, Absolute 0.23 0.10 - 0.30 10*3/mm3    Basophils, Absolute 0.05 0.00 - 0.20 10*3/mm3    Immature Grans, Absolute 0.02 0.00 - 0.03 10*3/mm3    nRBC 0.0 0.0 - 0.0 /100 WBC   Amylase   Result Value Ref Range    Amylase 118 (H) 28 - 100 U/L   Lipase   Result Value Ref Range    Lipase 81 (H) 13 - 60 U/L           Neville was seen today for follow-up and pancreatitis.    Diagnoses and all orders for this visit:    Needs flu shot  -     Fluarix/Fluzone/Afluria Quad ([id number])    Essential hypertension  -     losartan-hydrochlorothiazide (HYZAAR) 100-12.5 MG per tablet; Take 1 tablet by mouth Daily.    Acute pancreatitis, unspecified complication status, unspecified pancreatitis type    no further issues with pancreatitis.    I had a long discussion with patient about [facility] again and their off label uses of medications without  evidence to back up their practices.  I do not have the labs the clinic [facility] but the pt previously did not have any abnormal thyroid labs.  I advised against use of this clinic.  The pt says she currently feels great but will do some more research into the clinic.        Outpatient Medications Prior to Visit   Medication Sig Dispense Refill   • albuterol (PROVENTIL HFA;VENTOLIN HFA) 108 (90 BASE) MCG/ACT inhaler Ventolin  (90 Base) MCG/ACT Inhalation Aerosol Solution; Patient Sig: Ventolin  (90 Base) MCG/ACT Inhalation Aerosol Solution INHALE 2-4 PUFFS Q 4 HOURS PRN/COUGH/WHEEZE; 1; 0; 21-Aug-2015; Active     • ALPRAZolam XR (XANAX XR) 0.5 MG 24 hr tablet Take 1 tablet by mouth Daily. (Patient taking differently: Take 0.5 mg by mouth Daily As Needed.) 30 tablet 0   • azelastine (ASTEPRO) 0.15 % solution nasal spray 2 sprays into each nostril 2 (Two) Times a Day. (Patient taking differently: 2 sprays into the nostril(s) as directed by provider 2 (Two) Times a Day As Needed.) 30 mL 1   • buPROPion XL (WELLBUTRIN XL) 150 MG 24 hr tablet Take 150 mg by mouth Daily.     • Cholecalciferol (VITAMIN D3) 55025 units tablet chew one tablet by mouth every day WITH FOOD  1   • clobetasol (TEMOVATE) 0.05 % ointment Apply  topically 2 (Two) Times a Day. 60 g 1   • estradiol (ESTRACE) 0.1 MG/GM vaginal cream Insert 1 g into the vagina 2 (Two) Times a Week. 45 g 6   • liothyronine (CYTOMEL) 5 MCG tablet take one Tablet by mouth every morning on an empty stomach  1   • Prasterone, DHEA, (DHEA 50 PO) TAKE ONE Capsule BY MOUTH EVERY DAY before noon  1   • Progesterone Micronized (PROGESTERONE PO) TAKE ONE Capsule BY MOUTH EVERY NIGHT AT BEDTIME  1   • valACYclovir (VALTREX) 1000 MG tablet Take 1 tablet by mouth 3 (Three) Times a Day. 21 tablet 0   • losartan (COZAAR) 100 MG tablet TAKE ONE PO DAILY 90 tablet 0   • levothyroxine (SYNTHROID, LEVOTHROID) 50 MCG tablet take two  Tablets by mouth every morning on an empty  stomach total 100mcg  1     No facility-administered medications prior to visit.      New Medications Ordered This Visit   Medications   • losartan-hydrochlorothiazide (HYZAAR) 100-12.5 MG per tablet     Sig: Take 1 tablet by mouth Daily.     Dispense:  90 tablet     Refill:  3     [unfilled]  Medications Discontinued During This Encounter   Medication Reason   • levothyroxine (SYNTHROID, LEVOTHROID) 50 MCG tablet Dose adjustment   • losartan (COZAAR) 100 MG tablet          Return in about 6 months (around 3/28/2019) for Recheck.

## 2018-10-15 ENCOUNTER — OFFICE VISIT (OUTPATIENT)
Dept: OBSTETRICS AND GYNECOLOGY | Facility: CLINIC | Age: 53
End: 2018-10-15

## 2018-10-15 VITALS
WEIGHT: 166.8 LBS | DIASTOLIC BLOOD PRESSURE: 72 MMHG | HEIGHT: 63 IN | BODY MASS INDEX: 29.55 KG/M2 | SYSTOLIC BLOOD PRESSURE: 111 MMHG

## 2018-10-15 DIAGNOSIS — Z86.718 HX OF BLOOD CLOTS: ICD-10-CM

## 2018-10-15 DIAGNOSIS — Z01.419 WELL WOMAN EXAM WITH ROUTINE GYNECOLOGICAL EXAM: Primary | ICD-10-CM

## 2018-10-15 DIAGNOSIS — N95.2 VAGINAL ATROPHY: ICD-10-CM

## 2018-10-15 DIAGNOSIS — N63.10 BREAST MASS, RIGHT: ICD-10-CM

## 2018-10-15 LAB
BILIRUB BLD-MCNC: NEGATIVE MG/DL
CLARITY, POC: CLEAR
COLOR UR: YELLOW
GLUCOSE UR STRIP-MCNC: NEGATIVE MG/DL
KETONES UR QL: NEGATIVE
LEUKOCYTE EST, POC: NEGATIVE
NITRITE UR-MCNC: NEGATIVE MG/ML
PH UR: 5 [PH] (ref 5–8)
PROT UR STRIP-MCNC: NEGATIVE MG/DL
RBC # UR STRIP: NEGATIVE /UL
SP GR UR: 1 (ref 1–1.03)
UROBILINOGEN UR QL: NORMAL

## 2018-10-15 PROCEDURE — 99213 OFFICE O/P EST LOW 20 MIN: CPT | Performed by: OBSTETRICS & GYNECOLOGY

## 2018-10-15 PROCEDURE — 81002 URINALYSIS NONAUTO W/O SCOPE: CPT | Performed by: OBSTETRICS & GYNECOLOGY

## 2018-10-15 PROCEDURE — 99396 PREV VISIT EST AGE 40-64: CPT | Performed by: OBSTETRICS & GYNECOLOGY

## 2018-10-15 RX ORDER — LEVOTHYROXINE SODIUM 0.1 MG/1
100 TABLET ORAL DAILY
Refills: 2 | COMMUNITY
Start: 2018-09-28 | End: 2019-10-30

## 2018-10-15 NOTE — PROGRESS NOTES
"GYN Annual Exam     CC- Here for annual exam.     Neville Rodriges is a 53 y.o. female established patient who presents for annual well woman exam. She has had a hysterectomy for pain in the distant past.  She had pancreatitis from Naprosyn.  She was seeing \"25 again\" and has stopped everything but her T3 supplementation. She has a h/o DVT and we discussed that she is not a candidate to take HRT due to her risk of MAU and she voices understanding and said that is why she quit taking the hormones. She has been using topical estrogen cream and says it has helped her vaginal dryness.       OB History      Para Term  AB Living    2 2 2     2    SAB TAB Ectopic Molar Multiple Live Births                       Obstetric Comments    2           Menarche:9  Menopause: s/p hysterectomy  HRT:none currently  Current contraception: status post hysterectomy  History of abnormal Pap smear: no  History of abnormal mammogram: no  Family history of uterine, colon or ovarian cancer: no  Family history of breast cancer: no  STD's: none  Last pap smear:10/17/17- normal pap/HPV  H/O DVT      Health Maintenance   Topic Date Due   • ZOSTER VACCINE (1 of 2) 2013   • COLONOSCOPY  2018   • ANNUAL PHYSICAL  2018   • MAMMOGRAM  2019   • LIPID PANEL  2019   • PAP SMEAR  10/17/2020   • TDAP/TD VACCINES (2 - Td) 2027   • HEPATITIS C SCREENING  Completed   • INFLUENZA VACCINE  Completed       Past Medical History:   Diagnosis Date   • Colon polyps     c-scope was fall . due for repeat in 2-3 years.   • Deep vein thrombosis (CMS/HCC)    • Depression    • Gastroesophageal reflux disease 2017   • Headache     More problems when big fluctuations in weather   • History of frequent urinary tract infections    • Humerus fracture    • Hypercholesterolemia     High Triglycerides   • Hypertension    • Migraine    • Pancreatitis    • Plantar fasciitis    • Sleep apnea    • Urinary tract " infection        Past Surgical History:   Procedure Laterality Date   • APPENDECTOMY     • CHOLECYSTECTOMY     • COLONOSCOPY     • HYSTERECTOMY      Partial   • SINUS SURGERY     • STOMACH SURGERY  12/10/2013    Gastric sleve   • TONSILLECTOMY           Current Outpatient Prescriptions:   •  albuterol (PROVENTIL HFA;VENTOLIN HFA) 108 (90 BASE) MCG/ACT inhaler, Ventolin  (90 Base) MCG/ACT Inhalation Aerosol Solution; Patient Sig: Ventolin  (90 Base) MCG/ACT Inhalation Aerosol Solution INHALE 2-4 PUFFS Q 4 HOURS PRN/COUGH/WHEEZE; 1; 0; 21-Aug-2015; Active, Disp: , Rfl:   •  ALPRAZolam XR (XANAX XR) 0.5 MG 24 hr tablet, Take 1 tablet by mouth Daily. (Patient taking differently: Take 0.5 mg by mouth Daily As Needed.), Disp: 30 tablet, Rfl: 0  •  azelastine (ASTEPRO) 0.15 % solution nasal spray, 2 sprays into each nostril 2 (Two) Times a Day. (Patient taking differently: 2 sprays into the nostril(s) as directed by provider 2 (Two) Times a Day As Needed.), Disp: 30 mL, Rfl: 1  •  buPROPion XL (WELLBUTRIN XL) 150 MG 24 hr tablet, Take 150 mg by mouth Daily., Disp: , Rfl:   •  Cholecalciferol (VITAMIN D3) 12002 units tablet, chew one tablet by mouth every day WITH FOOD, Disp: , Rfl: 1  •  clobetasol (TEMOVATE) 0.05 % ointment, Apply  topically 2 (Two) Times a Day., Disp: 60 g, Rfl: 1  •  estradiol (ESTRACE) 0.1 MG/GM vaginal cream, Insert 1 g into the vagina 2 (Two) Times a Week., Disp: 45 g, Rfl: 6  •  levothyroxine (SYNTHROID, LEVOTHROID) 100 MCG tablet, Take 100 mcg by mouth Daily., Disp: , Rfl: 2  •  liothyronine (CYTOMEL) 5 MCG tablet, take one Tablet by mouth every morning on an empty stomach, Disp: , Rfl: 1  •  losartan-hydrochlorothiazide (HYZAAR) 100-12.5 MG per tablet, Take 1 tablet by mouth Daily., Disp: 90 tablet, Rfl: 3  •  Prasterone, DHEA, (DHEA 50 PO), TAKE ONE Capsule BY MOUTH EVERY DAY before noon, Disp: , Rfl: 1  •  Progesterone Micronized (PROGESTERONE PO), TAKE ONE Capsule BY MOUTH EVERY  "NIGHT AT BEDTIME, Disp: , Rfl: 1  •  valACYclovir (VALTREX) 1000 MG tablet, Take 1 tablet by mouth 3 (Three) Times a Day., Disp: 21 tablet, Rfl: 0  •  venlafaxine (EFFEXOR) 75 MG tablet, Take 75 mg by mouth Daily., Disp: , Rfl:     Allergies   Allergen Reactions   • Ceftin [Cefuroxime Axetil]    • Paroxetine    • Ciprofloxacin Rash   • Metronidazole Rash       Social History   Substance Use Topics   • Smoking status: Never Smoker   • Smokeless tobacco: Not on file   • Alcohol use 0.6 oz/week     1 Glasses of wine per week      Comment: 2 X PER MONTH        Family History   Problem Relation Age of Onset   • Diabetes Mother    • Anxiety disorder Mother    • Depression Mother    • Diabetes Father    • Heart disease Father         CABG x 3 in early 60s   • Hypertension Father    • Anxiety disorder Sister    • Depression Sister    • Cancer Daughter         Synovial Cell Sarcoma   • Breast cancer Neg Hx    • Ovarian cancer Neg Hx    • Colon cancer Neg Hx        Review of Systems   Constitutional: Negative for appetite change, fatigue, fever and unexpected weight change.   Respiratory: Negative for cough and shortness of breath.    Cardiovascular: Negative for chest pain and palpitations.   Gastrointestinal: Negative for abdominal distention, abdominal pain, constipation, diarrhea and nausea.   Endocrine: Negative for cold intolerance and heat intolerance.   Genitourinary: Negative for dyspareunia, dysuria, menstrual problem, pelvic pain and vaginal discharge.   Skin: Negative for color change and rash.   Neurological: Negative for headaches.   Psychiatric/Behavioral: Negative for dysphoric mood. The patient is not nervous/anxious.        /72   Ht 160 cm (62.99\")   Wt 75.7 kg (166 lb 12.8 oz)   LMP  (LMP Unknown)   Breastfeeding? No   BMI 29.55 kg/m²     Physical Exam   Constitutional: She is oriented to person, place, and time. She appears well-developed and well-nourished.   HENT:   Head: Normocephalic and " atraumatic.   Neck: No thyromegaly present.   Cardiovascular: Normal rate and regular rhythm.    Pulmonary/Chest: Effort normal and breath sounds normal. Right breast exhibits mass. Right breast exhibits no inverted nipple, no nipple discharge, no skin change and no tenderness. Left breast exhibits no inverted nipple, no mass, no nipple discharge, no skin change and no tenderness.       Abdominal: Soft. Bowel sounds are normal. She exhibits no distension and no mass. There is no tenderness. No hernia.   Genitourinary: Pelvic exam was performed with patient supine. There is no rash, tenderness or lesion on the right labia. There is no rash, tenderness or lesion on the left labia. Right adnexum displays no mass, no tenderness and no fullness. Left adnexum displays no mass, no tenderness and no fullness. No erythema, tenderness or bleeding in the vagina. No foreign body in the vagina. No signs of injury around the vagina. No vaginal discharge found.   Neurological: She is oriented to person, place, and time.   Skin: Skin is warm and dry.   Psychiatric: She has a normal mood and affect. Her behavior is normal. Judgment and thought content normal.   Vitals reviewed.         Assessment/Plan    1) GYN HM: normal pap/HPV 10/2017    SBE demonstrated and encouraged.  2) STD screening: declines Condoms encouraged.  3) Bone health - Weight bearing exercise, dietary calcium recommendations and vitamin D reviewed.   4) Diet and Exercise discussed  5) Smoking Status: No   6) Use of HRT- Patient was counseled that evidence is lacking to support superiority claims of compounded bioidentical hormones over conventional menopausal hormone therapy.  Compounded or “customized” hormones pose additional risks above those known for FDA approved therapies, as compounded hormones have variable purity and potency.  Thee variability and lack of regulation in these therapies make both under dosage and over dosage possible.  Despite claims to the  contrary, evidence is inadequate to support increased efficacy or safety for individualized hormone therapy regimen based on salivary, serum or urinary testing.  In addition, she is NOT a candidate to take any systemic HRT as she has a H/o DVT and is at risk for recurrent MAU.    7) R breast mass schedule B dx MMG and US R breast.   8) DEXA-schedule  9)C scope- UTD 2018- repeat 3 years  10) Vaginal atrophy- symptoms improved on E2 cream.    Follow up prn and 1 year       Neville was seen today for gynecologic exam.    Diagnoses and all orders for this visit:    Well woman exam with routine gynecological exam  -     POC Urinalysis Dipstick    Breast mass, right  -     Mammo Diagnostic Bilateral With CAD; Future  -     US Breast Right Complete; Future    Vaginal atrophy    Hx of blood clots        Mei Padilla MD  10/15/18  8:09 PM

## 2018-10-18 ENCOUNTER — HOSPITAL ENCOUNTER (OUTPATIENT)
Dept: ULTRASOUND IMAGING | Facility: HOSPITAL | Age: 53
Discharge: HOME | End: 2018-10-18
Attending: OBSTETRICS & GYNECOLOGY

## 2018-10-18 ENCOUNTER — HOSPITAL ENCOUNTER (OUTPATIENT)
Dept: MAMMOGRAPHY | Facility: HOSPITAL | Age: 53
Discharge: HOME | End: 2018-10-18
Attending: OBSTETRICS & GYNECOLOGY | Admitting: OBSTETRICS & GYNECOLOGY

## 2018-10-18 DIAGNOSIS — N63.10 BREAST MASS, RIGHT: ICD-10-CM

## 2018-10-18 PROCEDURE — 76642 ULTRASOUND BREAST LIMITED: CPT

## 2018-10-18 PROCEDURE — G0279 TOMOSYNTHESIS, MAMMO: HCPCS

## 2018-10-18 PROCEDURE — 77066 DX MAMMO INCL CAD BI: CPT

## 2018-11-30 ENCOUNTER — TELEPHONE (OUTPATIENT)
Dept: INTERNAL MEDICINE | Facility: CLINIC | Age: 53
End: 2018-11-30

## 2018-11-30 NOTE — TELEPHONE ENCOUNTER
----- Message from Theresa San MA sent at 11/30/2018  4:16 PM EST -----  Regarding: RE: IMMUNIZATION PROOF  Contact: [phone]  This is also listed on the MR report we talked about earlier.  You can just print it and fax.  ----- Message -----  From: Shea Saldana  Sent: 11/30/2018   4:03 PM  To: [facility]  Subject: IMMUNIZATION PROOF                               BENJAMIN PT    Patient calling to say that she needs to have a copy of proof of her flu shot  Faxed to her at [phone] to her attention please.    Thanks!  shea

## 2018-12-27 RX ORDER — OMEPRAZOLE 20 MG/1
CAPSULE, DELAYED RELEASE ORAL
Qty: 30 CAPSULE | Refills: 2 | OUTPATIENT
Start: 2018-12-27

## 2019-02-14 ENCOUNTER — OFFICE VISIT (OUTPATIENT)
Dept: ORTHOPEDIC SURGERY | Facility: CLINIC | Age: 54
End: 2019-02-14

## 2019-02-14 VITALS
DIASTOLIC BLOOD PRESSURE: 66 MMHG | WEIGHT: 168 LBS | SYSTOLIC BLOOD PRESSURE: 114 MMHG | HEIGHT: 64 IN | BODY MASS INDEX: 28.68 KG/M2

## 2019-02-14 DIAGNOSIS — M94.261 CHONDROMALACIA OF KNEE, RIGHT: ICD-10-CM

## 2019-02-14 DIAGNOSIS — R52 PAIN: Primary | ICD-10-CM

## 2019-02-14 PROCEDURE — 99202 OFFICE O/P NEW SF 15 MIN: CPT | Performed by: ORTHOPAEDIC SURGERY

## 2019-02-14 PROCEDURE — 73562 X-RAY EXAM OF KNEE 3: CPT | Performed by: ORTHOPAEDIC SURGERY

## 2019-03-04 PROBLEM — M94.261 CHONDROMALACIA OF KNEE, RIGHT: Status: ACTIVE | Noted: 2019-03-04

## 2019-04-08 ENCOUNTER — TELEPHONE (OUTPATIENT)
Dept: INTERNAL MEDICINE | Facility: CLINIC | Age: 54
End: 2019-04-08

## 2019-04-08 NOTE — TELEPHONE ENCOUNTER
I left a voice mail to let the patient know that she should call back tomorrow am to check for cancellations, or go to the ER or urgent care if necessary          ----- Message from Jadyn Pierce MA sent at 4/8/2019 10:29 AM EDT -----  Regarding: RE: PAIN AROUND KIDNEY AREA  Contact: [phone]  If no availability with any other providers this week can either continue to call for same day availability or go to the  or ER.   ----- Message -----  From: Tiffany Saldana  Sent: 4/8/2019   9:12 AM  To: [facility]  Subject: PAIN AROUND KIDNEY AREA                          BENJAMIN PT    Patient called to say that she is having pains around her kidney area, her whole insides are hurting. No urine problems, pressure, pain, or frequency, and NO fever.  She is requesting an appointment.  Please advise    Thanks!  Tiffany

## 2019-05-22 DIAGNOSIS — Z01.84 IMMUNITY TO MEASLES, MUMPS, AND RUBELLA DETERMINED BY SEROLOGIC TEST: Primary | ICD-10-CM

## 2019-05-27 ENCOUNTER — RESULTS ENCOUNTER (OUTPATIENT)
Dept: INTERNAL MEDICINE | Facility: CLINIC | Age: 54
End: 2019-05-27

## 2019-05-27 DIAGNOSIS — Z01.84 IMMUNITY TO MEASLES, MUMPS, AND RUBELLA DETERMINED BY SEROLOGIC TEST: ICD-10-CM

## 2019-07-03 ENCOUNTER — OFFICE (OUTPATIENT)
Dept: URBAN - METROPOLITAN AREA CLINIC 66 | Facility: CLINIC | Age: 54
End: 2019-07-03
Payer: OTHER GOVERNMENT

## 2019-07-03 VITALS
DIASTOLIC BLOOD PRESSURE: 70 MMHG | WEIGHT: 174 LBS | HEIGHT: 64 IN | SYSTOLIC BLOOD PRESSURE: 118 MMHG | HEART RATE: 80 BPM

## 2019-07-03 DIAGNOSIS — K29.70 GASTRITIS, UNSPECIFIED, WITHOUT BLEEDING: ICD-10-CM

## 2019-07-03 DIAGNOSIS — K62.89 OTHER SPECIFIED DISEASES OF ANUS AND RECTUM: ICD-10-CM

## 2019-07-03 DIAGNOSIS — R10.13 EPIGASTRIC PAIN: ICD-10-CM

## 2019-07-03 DIAGNOSIS — R10.11 RIGHT UPPER QUADRANT PAIN: ICD-10-CM

## 2019-07-03 DIAGNOSIS — K20.9 ESOPHAGITIS, UNSPECIFIED: ICD-10-CM

## 2019-07-03 PROCEDURE — 99214 OFFICE O/P EST MOD 30 MIN: CPT | Mod: 25 | Performed by: NURSE PRACTITIONER

## 2019-07-03 PROCEDURE — 20552 NJX 1/MLT TRIGGER POINT 1/2: CPT | Performed by: NURSE PRACTITIONER

## 2019-07-03 RX ORDER — OMEPRAZOLE 20 MG/1
20 CAPSULE, DELAYED RELEASE ORAL
Qty: 30 | Refills: 5 | Status: ACTIVE

## 2019-10-18 DIAGNOSIS — R63.5 ABNORMAL WEIGHT GAIN: ICD-10-CM

## 2019-10-18 DIAGNOSIS — K85.00 IDIOPATHIC ACUTE PANCREATITIS WITHOUT INFECTION OR NECROSIS: ICD-10-CM

## 2019-10-18 DIAGNOSIS — I10 HTN (HYPERTENSION), BENIGN: ICD-10-CM

## 2019-10-18 DIAGNOSIS — Z23 NEEDS FLU SHOT: Primary | ICD-10-CM

## 2019-10-18 DIAGNOSIS — E78.2 MIXED HYPERLIPIDEMIA: ICD-10-CM

## 2019-10-23 ENCOUNTER — RESULTS ENCOUNTER (OUTPATIENT)
Dept: INTERNAL MEDICINE | Facility: CLINIC | Age: 54
End: 2019-10-23

## 2019-10-23 DIAGNOSIS — I10 HTN (HYPERTENSION), BENIGN: ICD-10-CM

## 2019-10-23 DIAGNOSIS — K85.00 IDIOPATHIC ACUTE PANCREATITIS WITHOUT INFECTION OR NECROSIS: ICD-10-CM

## 2019-10-23 DIAGNOSIS — E78.2 MIXED HYPERLIPIDEMIA: ICD-10-CM

## 2019-10-23 DIAGNOSIS — R63.5 ABNORMAL WEIGHT GAIN: ICD-10-CM

## 2019-10-26 LAB
ALBUMIN SERPL-MCNC: 4.3 G/DL (ref 3.5–5.5)
ALBUMIN/GLOB SERPL: 2 {RATIO} (ref 1.2–2.2)
ALP SERPL-CCNC: 69 IU/L (ref 39–117)
ALT SERPL-CCNC: 12 IU/L (ref 0–32)
AST SERPL-CCNC: 15 IU/L (ref 0–40)
BASOPHILS # BLD AUTO: 0.1 X10E3/UL (ref 0–0.2)
BASOPHILS NFR BLD AUTO: 1 %
BILIRUB SERPL-MCNC: 0.4 MG/DL (ref 0–1.2)
BUN SERPL-MCNC: 12 MG/DL (ref 6–24)
BUN/CREAT SERPL: 14 (ref 9–23)
CALCIUM SERPL-MCNC: 9.1 MG/DL (ref 8.7–10.2)
CHLORIDE SERPL-SCNC: 102 MMOL/L (ref 96–106)
CHOLEST SERPL-MCNC: 189 MG/DL (ref 100–199)
CO2 SERPL-SCNC: 26 MMOL/L (ref 20–29)
CREAT SERPL-MCNC: 0.83 MG/DL (ref 0.57–1)
EOSINOPHIL # BLD AUTO: 0.2 X10E3/UL (ref 0–0.4)
EOSINOPHIL NFR BLD AUTO: 3 %
ERYTHROCYTE [DISTWIDTH] IN BLOOD BY AUTOMATED COUNT: 13.9 % (ref 12.3–15.4)
GLOBULIN SER CALC-MCNC: 2.1 G/DL (ref 1.5–4.5)
GLUCOSE SERPL-MCNC: 91 MG/DL (ref 65–99)
HBA1C MFR BLD: 5.5 % (ref 4.8–5.6)
HCT VFR BLD AUTO: 39.5 % (ref 34–46.6)
HDLC SERPL-MCNC: 47 MG/DL
HGB BLD-MCNC: 13.1 G/DL (ref 11.1–15.9)
IMM GRANULOCYTES # BLD AUTO: 0 X10E3/UL (ref 0–0.1)
IMM GRANULOCYTES NFR BLD AUTO: 0 %
LDLC SERPL CALC-MCNC: 102 MG/DL (ref 0–99)
LDLC/HDLC SERPL: 2.2 RATIO (ref 0–3.2)
LYMPHOCYTES # BLD AUTO: 1.9 X10E3/UL (ref 0.7–3.1)
LYMPHOCYTES NFR BLD AUTO: 37 %
MCH RBC QN AUTO: 29.2 PG (ref 26.6–33)
MCHC RBC AUTO-ENTMCNC: 33.2 G/DL (ref 31.5–35.7)
MCV RBC AUTO: 88 FL (ref 79–97)
MONOCYTES # BLD AUTO: 0.4 X10E3/UL (ref 0.1–0.9)
MONOCYTES NFR BLD AUTO: 8 %
NEUTROPHILS # BLD AUTO: 2.6 X10E3/UL (ref 1.4–7)
NEUTROPHILS NFR BLD AUTO: 51 %
PLATELET # BLD AUTO: 283 X10E3/UL (ref 150–450)
POTASSIUM SERPL-SCNC: 4.3 MMOL/L (ref 3.5–5.2)
PROT SERPL-MCNC: 6.4 G/DL (ref 6–8.5)
RBC # BLD AUTO: 4.49 X10E6/UL (ref 3.77–5.28)
SODIUM SERPL-SCNC: 143 MMOL/L (ref 134–144)
T4 FREE SERPL-MCNC: 1.04 NG/DL (ref 0.82–1.77)
TRIGL SERPL-MCNC: 200 MG/DL (ref 0–149)
TSH SERPL DL<=0.005 MIU/L-ACNC: 2.04 UIU/ML (ref 0.45–4.5)
VLDLC SERPL CALC-MCNC: 40 MG/DL (ref 5–40)
WBC # BLD AUTO: 5.2 X10E3/UL (ref 3.4–10.8)

## 2019-10-30 ENCOUNTER — OFFICE VISIT (OUTPATIENT)
Dept: INTERNAL MEDICINE | Facility: CLINIC | Age: 54
End: 2019-10-30

## 2019-10-30 ENCOUNTER — TELEPHONE (OUTPATIENT)
Dept: INTERNAL MEDICINE | Facility: CLINIC | Age: 54
End: 2019-10-30

## 2019-10-30 VITALS
TEMPERATURE: 97.8 F | BODY MASS INDEX: 30.56 KG/M2 | RESPIRATION RATE: 16 BRPM | HEIGHT: 64 IN | HEART RATE: 71 BPM | SYSTOLIC BLOOD PRESSURE: 124 MMHG | DIASTOLIC BLOOD PRESSURE: 74 MMHG | OXYGEN SATURATION: 98 % | WEIGHT: 179 LBS

## 2019-10-30 DIAGNOSIS — R07.89 OTHER CHEST PAIN: ICD-10-CM

## 2019-10-30 DIAGNOSIS — I10 ESSENTIAL HYPERTENSION: ICD-10-CM

## 2019-10-30 DIAGNOSIS — Z00.00 HEALTHCARE MAINTENANCE: Primary | ICD-10-CM

## 2019-10-30 DIAGNOSIS — F41.9 ANXIETY: ICD-10-CM

## 2019-10-30 DIAGNOSIS — K21.9 GASTROESOPHAGEAL REFLUX DISEASE, ESOPHAGITIS PRESENCE NOT SPECIFIED: ICD-10-CM

## 2019-10-30 PROCEDURE — 99213 OFFICE O/P EST LOW 20 MIN: CPT | Performed by: INTERNAL MEDICINE

## 2019-10-30 PROCEDURE — 99396 PREV VISIT EST AGE 40-64: CPT | Performed by: INTERNAL MEDICINE

## 2019-10-30 RX ORDER — OMEPRAZOLE 20 MG/1
20 CAPSULE, DELAYED RELEASE ORAL
COMMUNITY
End: 2019-10-30 | Stop reason: SDUPTHER

## 2019-10-30 RX ORDER — ALPRAZOLAM 0.5 MG/1
0.5 TABLET, EXTENDED RELEASE ORAL DAILY PRN
Qty: 30 TABLET | Refills: 0 | Status: SHIPPED | OUTPATIENT
Start: 2019-10-30 | End: 2020-08-11 | Stop reason: SDUPTHER

## 2019-10-30 RX ORDER — ESTRADIOL 0.1 MG/G
1 CREAM VAGINAL 2 TIMES WEEKLY
Qty: 45 G | Refills: 6 | Status: SHIPPED | OUTPATIENT
Start: 2019-10-31 | End: 2019-11-22 | Stop reason: SDUPTHER

## 2019-10-30 RX ORDER — LOSARTAN POTASSIUM AND HYDROCHLOROTHIAZIDE 12.5; 1 MG/1; MG/1
1 TABLET ORAL DAILY
Qty: 90 TABLET | Refills: 3 | Status: SHIPPED | OUTPATIENT
Start: 2019-10-30 | End: 2019-11-22 | Stop reason: SDUPTHER

## 2019-10-30 RX ORDER — OMEPRAZOLE 20 MG/1
20 CAPSULE, DELAYED RELEASE ORAL DAILY
Qty: 90 CAPSULE | Refills: 3 | Status: SHIPPED | OUTPATIENT
Start: 2019-10-30 | End: 2019-11-22 | Stop reason: SDUPTHER

## 2019-10-30 NOTE — PROGRESS NOTES
Neville Rodriges is a 54 y.o. female, who presents with a chief complaint of   Chief Complaint   Patient presents with   • Annual Exam     CPE, lab results   follow up    HPI   Pt here for physical and f/u    Pt has not been eating as well over the past year.  She walks daily for exercise.  Weight up about 10 pounds.      Pap utd.  Due for mammogram    c-scope UTD    Due for dental appt    UTD at eye doctor.     Recently on vacation pt had chest pain and ended up in the ED with chest pain.  She says labs in the ED were normal.  ekg normal.  She is on meds for Gerd.  The pain was mid epigastric and spread up in her chest.  The pain woke her up from sleep.  She was diaphoretic.  Her mom has mitral valve issues s/p replacement and her dad  of heart disease.     HTN - doing well on current meds.  No ha/dizziness/cough.    Anxiety - takes xanax prn.     gerd - improved with omeprazole       The following portions of the patient's history were reviewed and updated as appropriate: allergies, current medications, past family history, past medical history, past social history, past surgical history and problem list.    Allergies: Paroxetine; Cefuroxime axetil; Cephalosporins; Ciprofloxacin; Metronidazole; and Paroxetine hcl    Review of Systems   Constitutional: Negative.    HENT: Negative.    Eyes: Negative.    Respiratory: Negative.    Cardiovascular: Negative.    Gastrointestinal: Negative.    Endocrine: Negative.    Genitourinary: Negative.    Musculoskeletal: Negative.    Skin: Negative.    Allergic/Immunologic: Negative.    Neurological: Negative.    Hematological: Negative.    Psychiatric/Behavioral: Negative.    All other systems reviewed and are negative.            Wt Readings from Last 3 Encounters:   10/30/19 81.2 kg (179 lb)   19 76.2 kg (168 lb)   10/15/18 75.7 kg (166 lb 12.8 oz)     Temp Readings from Last 3 Encounters:   10/30/19 97.8 °F (36.6 °C) (Oral)   18 97.7 °F (36.5 °C) (Oral)    06/22/18 98.2 °F (36.8 °C) (Oral)     BP Readings from Last 3 Encounters:   10/30/19 124/74   02/14/19 114/66   10/15/18 111/72     Pulse Readings from Last 3 Encounters:   10/30/19 71   09/28/18 90   08/20/18 79     Body mass index is 30.73 kg/m².  @LASTSAO2(3)@    Physical Exam   Constitutional: She is oriented to person, place, and time. She appears well-developed and well-nourished. No distress.   HENT:   Head: Normocephalic and atraumatic.   Right Ear: External ear normal.   Left Ear: External ear normal.   Nose: Nose normal.   Mouth/Throat: Oropharynx is clear and moist.   Eyes: Conjunctivae and EOM are normal. Pupils are equal, round, and reactive to light. Right eye exhibits no discharge. Left eye exhibits no discharge. No scleral icterus.   Neck: Normal range of motion. Neck supple. No JVD present. No tracheal deviation present. No thyromegaly present.   Cardiovascular: Normal rate, regular rhythm, normal heart sounds and intact distal pulses.   No murmur heard.  Pulmonary/Chest: Effort normal and breath sounds normal. No respiratory distress. She has no wheezes.   Abdominal: Soft. She exhibits no distension and no mass. There is no tenderness. There is no rebound and no guarding.   Musculoskeletal: Normal range of motion. She exhibits no edema or tenderness.   Normal gait   Lymphadenopathy:     She has no cervical adenopathy.   Neurological: She is alert and oriented to person, place, and time. She has normal reflexes. No cranial nerve deficit. She exhibits normal muscle tone.   Skin: Skin is warm and dry. No rash noted. No pallor.   Psychiatric: She has a normal mood and affect. Her behavior is normal. Judgment and thought content normal.   Nursing note and vitals reviewed.      Results for orders placed or performed in visit on 10/23/19   Lipid Panel With LDL / HDL Ratio   Result Value Ref Range    Total Cholesterol 189 100 - 199 mg/dL    Triglycerides 200 (H) 0 - 149 mg/dL    HDL Cholesterol 47 >39  mg/dL    VLDL Cholesterol 40 5 - 40 mg/dL    LDL Cholesterol  102 (H) 0 - 99 mg/dL    LDL/HDL Ratio 2.2 0.0 - 3.2 ratio   Hemoglobin A1c   Result Value Ref Range    Hemoglobin A1C 5.5 4.8 - 5.6 %   Comprehensive Metabolic Panel   Result Value Ref Range    Glucose 91 65 - 99 mg/dL    BUN 12 6 - 24 mg/dL    Creatinine 0.83 0.57 - 1.00 mg/dL    eGFR Non African Am 79 >59 mL/min/1.73    eGFR African Am 91 >59 mL/min/1.73    BUN/Creatinine Ratio 14 9 - 23    Sodium 143 134 - 144 mmol/L    Potassium 4.3 3.5 - 5.2 mmol/L    Chloride 102 96 - 106 mmol/L    Total CO2 26 20 - 29 mmol/L    Calcium 9.1 8.7 - 10.2 mg/dL    Total Protein 6.4 6.0 - 8.5 g/dL    Albumin 4.3 3.5 - 5.5 g/dL    Globulin 2.1 1.5 - 4.5 g/dL    A/G Ratio 2.0 1.2 - 2.2    Total Bilirubin 0.4 0.0 - 1.2 mg/dL    Alkaline Phosphatase 69 39 - 117 IU/L    AST (SGOT) 15 0 - 40 IU/L    ALT (SGPT) 12 0 - 32 IU/L   TSH   Result Value Ref Range    TSH 2.040 0.450 - 4.500 uIU/mL   T4, Free   Result Value Ref Range    Free T4 1.04 0.82 - 1.77 ng/dL   CBC & Differential   Result Value Ref Range    WBC 5.2 3.4 - 10.8 x10E3/uL    RBC 4.49 3.77 - 5.28 x10E6/uL    Hemoglobin 13.1 11.1 - 15.9 g/dL    Hematocrit 39.5 34.0 - 46.6 %    MCV 88 79 - 97 fL    MCH 29.2 26.6 - 33.0 pg    MCHC 33.2 31.5 - 35.7 g/dL    RDW 13.9 12.3 - 15.4 %    Platelets 283 150 - 450 x10E3/uL    Neutrophil Rel % 51 Not Estab. %    Lymphocyte Rel % 37 Not Estab. %    Monocyte Rel % 8 Not Estab. %    Eosinophil Rel % 3 Not Estab. %    Basophil Rel % 1 Not Estab. %    Neutrophils Absolute 2.6 1.4 - 7.0 x10E3/uL    Lymphocytes Absolute 1.9 0.7 - 3.1 x10E3/uL    Monocytes Absolute 0.4 0.1 - 0.9 x10E3/uL    Eosinophils Absolute 0.2 0.0 - 0.4 x10E3/uL    Basophils Absolute 0.1 0.0 - 0.2 x10E3/uL    Immature Granulocyte Rel % 0 Not Estab. %    Immature Grans Absolute 0.0 0.0 - 0.1 x10E3/uL           Neville was seen today for annual exam.    Diagnoses and all orders for this visit:    Healthcare maintenance  - health maintenance discussed and handout given  -     Mammo Screening Bilateral With CAD; Future    Anxiety  -     ALPRAZolam XR (XANAX XR) 0.5 MG 24 hr tablet; Take 1 tablet by mouth Daily As Needed for Anxiety.    Essential hypertension  -     losartan-hydrochlorothiazide (HYZAAR) 100-12.5 MG per tablet; Take 1 tablet by mouth Daily.    Other chest pain  -     Treadmill Stress Test; Future  -     Adult Transthoracic Echo Complete W/ Cont if Necessary Per Protocol; Future    Gastroesophageal reflux disease, esophagitis presence not specified  -     omeprazole (priLOSEC) 20 MG capsule; Take 1 capsule by mouth Daily.    Other orders  -     estradiol (ESTRACE) 0.1 MG/GM vaginal cream; Insert 1 g into the vagina 2 (Two) Times a Week.          Outpatient Medications Prior to Visit   Medication Sig Dispense Refill   • albuterol (PROVENTIL HFA;VENTOLIN HFA) 108 (90 BASE) MCG/ACT inhaler Ventolin  (90 Base) MCG/ACT Inhalation Aerosol Solution; Patient Sig: Ventolin  (90 Base) MCG/ACT Inhalation Aerosol Solution INHALE 2-4 PUFFS Q 4 HOURS PRN/COUGH/WHEEZE; 1; 0; 21-Aug-2015; Active     • azelastine (ASTEPRO) 0.15 % solution nasal spray 2 sprays into each nostril 2 (Two) Times a Day. (Patient taking differently: 2 sprays into the nostril(s) as directed by provider 2 (Two) Times a Day As Needed.) 30 mL 1   • Cholecalciferol (VITAMIN D3) 52227 units tablet chew one tablet by mouth every day WITH FOOD  1   • clobetasol (TEMOVATE) 0.05 % ointment Apply  topically 2 (Two) Times a Day. 60 g 1   • Vortioxetine HBr 20 MG tablet Take 15 mg by mouth Daily.     • ALPRAZolam XR (XANAX XR) 0.5 MG 24 hr tablet Take 1 tablet by mouth Daily. (Patient taking differently: Take 0.5 mg by mouth Daily As Needed.) 30 tablet 0   • estradiol (ESTRACE) 0.1 MG/GM vaginal cream Insert 1 g into the vagina 2 (Two) Times a Week. 45 g 6   • losartan-hydrochlorothiazide (HYZAAR) 100-12.5 MG per tablet Take 1 tablet by mouth Daily. 90 tablet  3   • omeprazole (priLOSEC) 20 MG capsule Take 20 mg by mouth.     • buPROPion XL (WELLBUTRIN XL) 150 MG 24 hr tablet Take 150 mg by mouth Daily.     • levothyroxine (SYNTHROID, LEVOTHROID) 100 MCG tablet Take 100 mcg by mouth Daily.  2   • liothyronine (CYTOMEL) 5 MCG tablet take one Tablet by mouth every morning on an empty stomach  1   • NP THYROID PO Take  by mouth.     • Prasterone, DHEA, (DHEA 50 PO) TAKE ONE Capsule BY MOUTH EVERY DAY before noon  1   • Progesterone Micronized (PROGESTERONE PO) TAKE ONE Capsule BY MOUTH EVERY NIGHT AT BEDTIME  1   • valACYclovir (VALTREX) 1000 MG tablet Take 1 tablet by mouth 3 (Three) Times a Day. 21 tablet 0   • venlafaxine (EFFEXOR) 75 MG tablet Take 75 mg by mouth Daily.       No facility-administered medications prior to visit.      New Medications Ordered This Visit   Medications   • ALPRAZolam XR (XANAX XR) 0.5 MG 24 hr tablet     Sig: Take 1 tablet by mouth Daily As Needed for Anxiety.     Dispense:  30 tablet     Refill:  0   • losartan-hydrochlorothiazide (HYZAAR) 100-12.5 MG per tablet     Sig: Take 1 tablet by mouth Daily.     Dispense:  90 tablet     Refill:  3   • omeprazole (priLOSEC) 20 MG capsule     Sig: Take 1 capsule by mouth Daily.     Dispense:  90 capsule     Refill:  3   • estradiol (ESTRACE) 0.1 MG/GM vaginal cream     Sig: Insert 1 g into the vagina 2 (Two) Times a Week.     Dispense:  45 g     Refill:  6     [unfilled]  Medications Discontinued During This Encounter   Medication Reason   • buPROPion XL (WELLBUTRIN XL) 150 MG 24 hr tablet *Therapy completed   • levothyroxine (SYNTHROID, LEVOTHROID) 100 MCG tablet *Therapy completed   • liothyronine (CYTOMEL) 5 MCG tablet *Therapy completed   • Prasterone, DHEA, (DHEA 50 PO) *Therapy completed   • Progesterone Micronized (PROGESTERONE PO) *Therapy completed   • NP THYROID PO *Therapy completed   • valACYclovir (VALTREX) 1000 MG tablet *Therapy completed   • venlafaxine (EFFEXOR) 75 MG tablet  *Therapy completed   • ALPRAZolam XR (XANAX XR) 0.5 MG 24 hr tablet Reorder   • losartan-hydrochlorothiazide (HYZAAR) 100-12.5 MG per tablet Reorder   • omeprazole (priLOSEC) 20 MG capsule Reorder   • estradiol (ESTRACE) 0.1 MG/GM vaginal cream Reorder         Return in about 6 months (around 4/30/2020) for Recheck, labs.

## 2019-10-30 NOTE — PATIENT INSTRUCTIONS
Health Maintenance, Female  Adopting a healthy lifestyle and getting preventive care can go a long way to promote health and wellness. Talk with your health care provider about what schedule of regular examinations is right for you. This is a good chance for you to check in with your provider about disease prevention and staying healthy.  In between checkups, there are plenty of things you can do on your own. Experts have done a lot of research about which lifestyle changes and preventive measures are most likely to keep you healthy. Ask your health care provider for more information.  Weight and diet  Eat a healthy diet  · Be sure to include plenty of vegetables, fruits, low-fat dairy products, and lean protein.  · Do not eat a lot of foods high in solid fats, added sugars, or salt.  · Get regular exercise. This is one of the most important things you can do for your health.  ? Most adults should exercise for at least 150 minutes each week. The exercise should increase your heart rate and make you sweat (moderate-intensity exercise).  ? Most adults should also do strengthening exercises at least twice a week. This is in addition to the moderate-intensity exercise.  Maintain a healthy weight  · Body mass index (BMI) is a measurement that can be used to identify possible weight problems. It estimates body fat based on height and weight. Your health care provider can help determine your BMI and help you achieve or maintain a healthy weight.  · For females 20 years of age and older:  ? A BMI below 18.5 is considered underweight.  ? A BMI of 18.5 to 24.9 is normal.  ? A BMI of 25 to 29.9 is considered overweight.  ? A BMI of 30 and above is considered obese.  Watch levels of cholesterol and blood lipids  · You should start having your blood tested for lipids and cholesterol at 20 years of age, then have this test every 5 years.  · You may need to have your cholesterol levels checked more often if:  ? Your lipid or  cholesterol levels are high.  ? You are older than 50 years of age.  ? You are at high risk for heart disease.  Cancer screening  Lung Cancer  · Lung cancer screening is recommended for adults 55-80 years old who are at high risk for lung cancer because of a history of smoking.  · A yearly low-dose CT scan of the lungs is recommended for people who:  ? Currently smoke.  ? Have quit within the past 15 years.  ? Have at least a 30-pack-year history of smoking. A pack year is smoking an average of one pack of cigarettes a day for 1 year.  · Yearly screening should continue until it has been 15 years since you quit.  · Yearly screening should stop if you develop a health problem that would prevent you from having lung cancer treatment.  Breast Cancer  · Practice breast self-awareness. This means understanding how your breasts normally appear and feel.  · It also means doing regular breast self-exams. Let your health care provider know about any changes, no matter how small.  · If you are in your 20s or 30s, you should have a clinical breast exam (CBE) by a health care provider every 1-3 years as part of a regular health exam.  · If you are 40 or older, have a CBE every year. Also consider having a breast X-ray (mammogram) every year.  · If you have a family history of breast cancer, talk to your health care provider about genetic screening.  · If you are at high risk for breast cancer, talk to your health care provider about having an MRI and a mammogram every year.  · Breast cancer gene (BRCA) assessment is recommended for women who have family members with BRCA-related cancers. BRCA-related cancers include:  ? Breast.  ? Ovarian.  ? Tubal.  ? Peritoneal cancers.  · Results of the assessment will determine the need for genetic counseling and BRCA1 and BRCA2 testing.  Cervical Cancer  Your health care provider may recommend that you be screened regularly for cancer of the pelvic organs (ovaries, uterus, and vagina).  This screening involves a pelvic examination, including checking for microscopic changes to the surface of your cervix (Pap test). You may be encouraged to have this screening done every 3 years, beginning at age 21.  · For women ages 30-65, health care providers may recommend pelvic exams and Pap testing every 3 years, or they may recommend the Pap and pelvic exam, combined with testing for human papilloma virus (HPV), every 5 years. Some types of HPV increase your risk of cervical cancer. Testing for HPV may also be done on women of any age with unclear Pap test results.  · Other health care providers may not recommend any screening for nonpregnant women who are considered low risk for pelvic cancer and who do not have symptoms. Ask your health care provider if a screening pelvic exam is right for you.  · If you have had past treatment for cervical cancer or a condition that could lead to cancer, you need Pap tests and screening for cancer for at least 20 years after your treatment. If Pap tests have been discontinued, your risk factors (such as having a new sexual partner) need to be reassessed to determine if screening should resume. Some women have medical problems that increase the chance of getting cervical cancer. In these cases, your health care provider may recommend more frequent screening and Pap tests.  Colorectal Cancer  · This type of cancer can be detected and often prevented.  · Routine colorectal cancer screening usually begins at 50 years of age and continues through 75 years of age.  · Your health care provider may recommend screening at an earlier age if you have risk factors for colon cancer.  · Your health care provider may also recommend using home test kits to check for hidden blood in the stool.  · A small camera at the end of a tube can be used to examine your colon directly (sigmoidoscopy or colonoscopy). This is done to check for the earliest forms of colorectal cancer.  · Routine  screening usually begins at age 50.  · Direct examination of the colon should be repeated every 5-10 years through 75 years of age. However, you may need to be screened more often if early forms of precancerous polyps or small growths are found.  Skin Cancer  · Check your skin from head to toe regularly.  · Tell your health care provider about any new moles or changes in moles, especially if there is a change in a mole's shape or color.  · Also tell your health care provider if you have a mole that is larger than the size of a pencil eraser.  · Always use sunscreen. Apply sunscreen liberally and repeatedly throughout the day.  · Protect yourself by wearing long sleeves, pants, a wide-brimmed hat, and sunglasses whenever you are outside.  Heart disease, diabetes, and high blood pressure  · High blood pressure causes heart disease and increases the risk of stroke. High blood pressure is more likely to develop in:  ? People who have blood pressure in the high end of the normal range (130-139/85-89 mm Hg).  ? People who are overweight or obese.  ? People who are .  · If you are 18-39 years of age, have your blood pressure checked every 3-5 years. If you are 40 years of age or older, have your blood pressure checked every year. You should have your blood pressure measured twice--once when you are at a hospital or clinic, and once when you are not at a hospital or clinic. Record the average of the two measurements. To check your blood pressure when you are not at a hospital or clinic, you can use:  ? An automated blood pressure machine at a pharmacy.  ? A home blood pressure monitor.  · If you are between 55 years and 79 years old, ask your health care provider if you should take aspirin to prevent strokes.  · Have regular diabetes screenings. This involves taking a blood sample to check your fasting blood sugar level.  ? If you are at a normal weight and have a low risk for diabetes, have this test once  every three years after 45 years of age.  ? If you are overweight and have a high risk for diabetes, consider being tested at a younger age or more often.  Preventing infection  Hepatitis B  · If you have a higher risk for hepatitis B, you should be screened for this virus. You are considered at high risk for hepatitis B if:  ? You were born in a country where hepatitis B is common. Ask your health care provider which countries are considered high risk.  ? Your parents were born in a high-risk country, and you have not been immunized against hepatitis B (hepatitis B vaccine).  ? You have HIV or AIDS.  ? You use needles to inject street drugs.  ? You live with someone who has hepatitis B.  ? You have had sex with someone who has hepatitis B.  ? You get hemodialysis treatment.  ? You take certain medicines for conditions, including cancer, organ transplantation, and autoimmune conditions.  Hepatitis C  · Blood testing is recommended for:  ? Everyone born from 1945 through 1965.  ? Anyone with known risk factors for hepatitis C.  Sexually transmitted infections (STIs)  · You should be screened for sexually transmitted infections (STIs) including gonorrhea and chlamydia if:  ? You are sexually active and are younger than 24 years of age.  ? You are older than 24 years of age and your health care provider tells you that you are at risk for this type of infection.  ? Your sexual activity has changed since you were last screened and you are at an increased risk for chlamydia or gonorrhea. Ask your health care provider if you are at risk.  · If you do not have HIV, but are at risk, it may be recommended that you take a prescription medicine daily to prevent HIV infection. This is called pre-exposure prophylaxis (PrEP). You are considered at risk if:  ? You are sexually active and do not regularly use condoms or know the HIV status of your partner(s).  ? You take drugs by injection.  ? You are sexually active with a partner  who has HIV.  Talk with your health care provider about whether you are at high risk of being infected with HIV. If you choose to begin PrEP, you should first be tested for HIV. You should then be tested every 3 months for as long as you are taking PrEP.  Pregnancy  · If you are premenopausal and you may become pregnant, ask your health care provider about preconception counseling.  · If you may become pregnant, take 400 to 800 micrograms (mcg) of folic acid every day.  · If you want to prevent pregnancy, talk to your health care provider about birth control (contraception).  Osteoporosis and menopause  · Osteoporosis is a disease in which the bones lose minerals and strength with aging. This can result in serious bone fractures. Your risk for osteoporosis can be identified using a bone density scan.  · If you are 65 years of age or older, or if you are at risk for osteoporosis and fractures, ask your health care provider if you should be screened.  · Ask your health care provider whether you should take a calcium or vitamin D supplement to lower your risk for osteoporosis.  · Menopause may have certain physical symptoms and risks.  · Hormone replacement therapy may reduce some of these symptoms and risks.  Talk to your health care provider about whether hormone replacement therapy is right for you.  Follow these instructions at home:  · Schedule regular health, dental, and eye exams.  · Stay current with your immunizations.  · Do not use any tobacco products including cigarettes, chewing tobacco, or electronic cigarettes.  · If you are pregnant, do not drink alcohol.  · If you are breastfeeding, limit how much and how often you drink alcohol.  · Limit alcohol intake to no more than 1 drink per day for nonpregnant women. One drink equals 12 ounces of beer, 5 ounces of wine, or 1½ ounces of hard liquor.  · Do not use street drugs.  · Do not share needles.  · Ask your health care provider for help if you need support  or information about quitting drugs.  · Tell your health care provider if you often feel depressed.  · Tell your health care provider if you have ever been abused or do not feel safe at home.  This information is not intended to replace advice given to you by your health care provider. Make sure you discuss any questions you have with your health care provider.  Document Released: 07/02/2012 Document Revised: 05/25/2017 Document Reviewed: 09/20/2016  [employer] Patient Education © 2019 [employer]

## 2019-10-30 NOTE — TELEPHONE ENCOUNTER
----- Message from Meryl Mesa MD sent at 10/29/2019  5:32 PM EDT -----  Labs ok. Will discuss at upcoming appt.

## 2019-11-12 ENCOUNTER — HOSPITAL ENCOUNTER (OUTPATIENT)
Dept: MAMMOGRAPHY | Facility: HOSPITAL | Age: 54
Discharge: HOME | End: 2019-11-12
Admitting: INTERNAL MEDICINE

## 2019-11-12 DIAGNOSIS — Z00.00 HEALTHCARE MAINTENANCE: ICD-10-CM

## 2019-11-12 PROCEDURE — 77063 BREAST TOMOSYNTHESIS BI: CPT

## 2019-11-12 PROCEDURE — 77067 SCR MAMMO BI INCL CAD: CPT

## 2019-11-22 DIAGNOSIS — K21.9 GASTROESOPHAGEAL REFLUX DISEASE, ESOPHAGITIS PRESENCE NOT SPECIFIED: ICD-10-CM

## 2019-11-22 DIAGNOSIS — I10 ESSENTIAL HYPERTENSION: ICD-10-CM

## 2019-11-22 RX ORDER — LOSARTAN POTASSIUM AND HYDROCHLOROTHIAZIDE 12.5; 1 MG/1; MG/1
1 TABLET ORAL DAILY
Qty: 90 TABLET | Refills: 3 | Status: SHIPPED | OUTPATIENT
Start: 2019-11-22 | End: 2020-08-11 | Stop reason: SDUPTHER

## 2019-11-22 RX ORDER — ESTRADIOL 0.1 MG/G
1 CREAM VAGINAL 2 TIMES WEEKLY
Qty: 45 G | Refills: 6 | Status: SHIPPED | OUTPATIENT
Start: 2019-11-25 | End: 2020-02-13 | Stop reason: SDUPTHER

## 2019-11-22 RX ORDER — OMEPRAZOLE 20 MG/1
20 CAPSULE, DELAYED RELEASE ORAL DAILY
Qty: 90 CAPSULE | Refills: 3 | Status: SHIPPED | OUTPATIENT
Start: 2019-11-22 | End: 2020-08-11 | Stop reason: SDUPTHER

## 2019-11-26 ENCOUNTER — OFFICE VISIT (OUTPATIENT)
Dept: INTERNAL MEDICINE | Facility: CLINIC | Age: 54
End: 2019-11-26

## 2019-11-26 VITALS
TEMPERATURE: 98.7 F | OXYGEN SATURATION: 98 % | HEART RATE: 70 BPM | DIASTOLIC BLOOD PRESSURE: 78 MMHG | BODY MASS INDEX: 30.32 KG/M2 | HEIGHT: 64 IN | WEIGHT: 177.6 LBS | SYSTOLIC BLOOD PRESSURE: 124 MMHG | RESPIRATION RATE: 18 BRPM

## 2019-11-26 DIAGNOSIS — M54.42 CHRONIC BILATERAL LOW BACK PAIN WITH LEFT-SIDED SCIATICA: Primary | ICD-10-CM

## 2019-11-26 DIAGNOSIS — G89.29 CHRONIC BILATERAL LOW BACK PAIN WITH LEFT-SIDED SCIATICA: Primary | ICD-10-CM

## 2019-11-26 PROCEDURE — 99213 OFFICE O/P EST LOW 20 MIN: CPT | Performed by: INTERNAL MEDICINE

## 2019-11-26 NOTE — PROGRESS NOTES
Neville Rodriges is a 54 y.o. female, who presents with a chief complaint of   Chief Complaint   Patient presents with   • Back Pain     low back pain goes down into left leg has been going on for 4 to 6 weeks       HPI   Pt here bc of 4-6 weeks of low back pain.  occ left sided sciatica.  She tried getting a new bed and no changes.  She has been doing back exercises at home with no help.  Staying in any position for a long time makes sx worse.  No help with heat.  Her legs recently started feeling a little weak.  No bowel/bladder issues.  No fall or trauma.  No specific issues that pt aware of that triggered pain.  No real help with OTC pain meds.     The following portions of the patient's history were reviewed and updated as appropriate: allergies, current medications, past family history, past medical history, past social history, past surgical history and problem list.    Allergies: Paroxetine; Cefuroxime axetil; Cephalosporins; Ciprofloxacin; Metronidazole; and Paroxetine hcl    Review of Systems   Constitutional: Negative.    HENT: Negative.    Eyes: Negative.    Respiratory: Negative.    Cardiovascular: Negative.    Gastrointestinal: Negative.    Endocrine: Negative.    Genitourinary: Negative.    Musculoskeletal: Positive for back pain.   Skin: Negative.    Allergic/Immunologic: Negative.    Neurological: Negative.    Hematological: Negative.    Psychiatric/Behavioral: Negative.    All other systems reviewed and are negative.            Wt Readings from Last 3 Encounters:   11/26/19 80.6 kg (177 lb 9.6 oz)   10/30/19 81.2 kg (179 lb)   02/14/19 76.2 kg (168 lb)     Temp Readings from Last 3 Encounters:   11/26/19 98.7 °F (37.1 °C) (Oral)   10/30/19 97.8 °F (36.6 °C) (Oral)   08/20/18 97.7 °F (36.5 °C) (Oral)     BP Readings from Last 3 Encounters:   11/26/19 124/78   10/30/19 124/74   02/14/19 114/66     Pulse Readings from Last 3 Encounters:   11/26/19 70   10/30/19 71   09/28/18 90     Body mass  index is 30.47 kg/m².  @LASTSAO2(3)@    Physical Exam   Constitutional: She is oriented to person, place, and time. She appears well-developed and well-nourished. No distress.   HENT:   Head: Normocephalic and atraumatic.   Right Ear: External ear normal.   Left Ear: External ear normal.   Nose: Nose normal.   Mouth/Throat: Oropharynx is clear and moist.   Eyes: Conjunctivae and EOM are normal. Pupils are equal, round, and reactive to light.   Neck: Normal range of motion. Neck supple.   Cardiovascular: Normal rate, regular rhythm, normal heart sounds and intact distal pulses.   Pulmonary/Chest: Effort normal and breath sounds normal. No respiratory distress. She has no wheezes.   Musculoskeletal: Normal range of motion.   Normal gait  Positive SLR on left, right normal.     Neurological: She is alert and oriented to person, place, and time. She displays normal reflexes.   Skin: Skin is warm and dry.   Psychiatric: She has a normal mood and affect. Her behavior is normal. Judgment and thought content normal.   Nursing note and vitals reviewed.      Results for orders placed or performed in visit on 10/23/19   Lipid Panel With LDL / HDL Ratio   Result Value Ref Range    Total Cholesterol 189 100 - 199 mg/dL    Triglycerides 200 (H) 0 - 149 mg/dL    HDL Cholesterol 47 >39 mg/dL    VLDL Cholesterol 40 5 - 40 mg/dL    LDL Cholesterol  102 (H) 0 - 99 mg/dL    LDL/HDL Ratio 2.2 0.0 - 3.2 ratio   Hemoglobin A1c   Result Value Ref Range    Hemoglobin A1C 5.5 4.8 - 5.6 %   Comprehensive Metabolic Panel   Result Value Ref Range    Glucose 91 65 - 99 mg/dL    BUN 12 6 - 24 mg/dL    Creatinine 0.83 0.57 - 1.00 mg/dL    eGFR Non African Am 79 >59 mL/min/1.73    eGFR African Am 91 >59 mL/min/1.73    BUN/Creatinine Ratio 14 9 - 23    Sodium 143 134 - 144 mmol/L    Potassium 4.3 3.5 - 5.2 mmol/L    Chloride 102 96 - 106 mmol/L    Total CO2 26 20 - 29 mmol/L    Calcium 9.1 8.7 - 10.2 mg/dL    Total Protein 6.4 6.0 - 8.5 g/dL     Albumin 4.3 3.5 - 5.5 g/dL    Globulin 2.1 1.5 - 4.5 g/dL    A/G Ratio 2.0 1.2 - 2.2    Total Bilirubin 0.4 0.0 - 1.2 mg/dL    Alkaline Phosphatase 69 39 - 117 IU/L    AST (SGOT) 15 0 - 40 IU/L    ALT (SGPT) 12 0 - 32 IU/L   TSH   Result Value Ref Range    TSH 2.040 0.450 - 4.500 uIU/mL   T4, Free   Result Value Ref Range    Free T4 1.04 0.82 - 1.77 ng/dL   CBC & Differential   Result Value Ref Range    WBC 5.2 3.4 - 10.8 x10E3/uL    RBC 4.49 3.77 - 5.28 x10E6/uL    Hemoglobin 13.1 11.1 - 15.9 g/dL    Hematocrit 39.5 34.0 - 46.6 %    MCV 88 79 - 97 fL    MCH 29.2 26.6 - 33.0 pg    MCHC 33.2 31.5 - 35.7 g/dL    RDW 13.9 12.3 - 15.4 %    Platelets 283 150 - 450 x10E3/uL    Neutrophil Rel % 51 Not Estab. %    Lymphocyte Rel % 37 Not Estab. %    Monocyte Rel % 8 Not Estab. %    Eosinophil Rel % 3 Not Estab. %    Basophil Rel % 1 Not Estab. %    Neutrophils Absolute 2.6 1.4 - 7.0 x10E3/uL    Lymphocytes Absolute 1.9 0.7 - 3.1 x10E3/uL    Monocytes Absolute 0.4 0.1 - 0.9 x10E3/uL    Eosinophils Absolute 0.2 0.0 - 0.4 x10E3/uL    Basophils Absolute 0.1 0.0 - 0.2 x10E3/uL    Immature Granulocyte Rel % 0 Not Estab. %    Immature Grans Absolute 0.0 0.0 - 0.1 x10E3/uL           Neville was seen today for back pain.    Diagnoses and all orders for this visit:    Chronic bilateral low back pain with left-sided sciatica  -     XR Spine Lumbar 2 or 3 View; Future  -     MRI Lumbar Spine Without Contrast; Future    cont low back exercises and otc meds for pain.  Will check imaging and then proceed based on results.       Outpatient Medications Prior to Visit   Medication Sig Dispense Refill   • albuterol (PROVENTIL HFA;VENTOLIN HFA) 108 (90 BASE) MCG/ACT inhaler Ventolin  (90 Base) MCG/ACT Inhalation Aerosol Solution; Patient Sig: Ventolin  (90 Base) MCG/ACT Inhalation Aerosol Solution INHALE 2-4 PUFFS Q 4 HOURS PRN/COUGH/WHEEZE; 1; 0; 21-Aug-2015; Active     • ALPRAZolam XR (XANAX XR) 0.5 MG 24 hr tablet Take 1  tablet by mouth Daily As Needed for Anxiety. 30 tablet 0   • azelastine (ASTEPRO) 0.15 % solution nasal spray 2 sprays into each nostril 2 (Two) Times a Day. (Patient taking differently: 2 sprays into the nostril(s) as directed by provider 2 (Two) Times a Day As Needed.) 30 mL 1   • Cholecalciferol (VITAMIN D3) 42695 units tablet chew one tablet by mouth every day WITH FOOD  1   • clobetasol (TEMOVATE) 0.05 % ointment Apply  topically 2 (Two) Times a Day. 60 g 1   • estradiol (ESTRACE) 0.1 MG/GM vaginal cream Insert 1 g into the vagina 2 (Two) Times a Week. 45 g 6   • losartan-hydrochlorothiazide (HYZAAR) 100-12.5 MG per tablet Take 1 tablet by mouth Daily. 90 tablet 3   • omeprazole (priLOSEC) 20 MG capsule Take 1 capsule by mouth Daily. 90 capsule 3   • Vortioxetine HBr 20 MG tablet Take 15 mg by mouth Daily.       No facility-administered medications prior to visit.      No orders of the defined types were placed in this encounter.    [unfilled]  There are no discontinued medications.      Return if symptoms worsen or fail to improve.

## 2019-12-04 ENCOUNTER — HOSPITAL ENCOUNTER (OUTPATIENT)
Dept: CARDIOLOGY | Facility: HOSPITAL | Age: 54
Discharge: HOME | End: 2019-12-04

## 2019-12-04 ENCOUNTER — HOSPITAL ENCOUNTER (OUTPATIENT)
Dept: CARDIOLOGY | Facility: HOSPITAL | Age: 54
Discharge: HOME | End: 2019-12-04
Admitting: INTERNAL MEDICINE

## 2019-12-04 VITALS
BODY MASS INDEX: 30.22 KG/M2 | WEIGHT: 177 LBS | DIASTOLIC BLOOD PRESSURE: 84 MMHG | HEIGHT: 64 IN | SYSTOLIC BLOOD PRESSURE: 125 MMHG

## 2019-12-04 DIAGNOSIS — R07.89 OTHER CHEST PAIN: ICD-10-CM

## 2019-12-04 LAB
BH CV ECHO MEAS - ACS: 2 CM
BH CV ECHO MEAS - AO MAX PG (FULL): 4.7 MMHG
BH CV ECHO MEAS - AO MAX PG: 9.4 MMHG
BH CV ECHO MEAS - AO MEAN PG (FULL): 3 MMHG
BH CV ECHO MEAS - AO MEAN PG: 5 MMHG
BH CV ECHO MEAS - AO ROOT AREA (BSA CORRECTED): 1.4
BH CV ECHO MEAS - AO ROOT AREA: 5.3 CM^2
BH CV ECHO MEAS - AO ROOT DIAM: 2.6 CM
BH CV ECHO MEAS - AO V2 MAX: 153 CM/SEC
BH CV ECHO MEAS - AO V2 MEAN: 101 CM/SEC
BH CV ECHO MEAS - AO V2 VTI: 32.4 CM
BH CV ECHO MEAS - ASC AORTA: 2.9 CM
BH CV ECHO MEAS - AVA(I,A): 1.7 CM^2
BH CV ECHO MEAS - AVA(I,D): 1.7 CM^2
BH CV ECHO MEAS - AVA(V,A): 1.8 CM^2
BH CV ECHO MEAS - AVA(V,D): 1.8 CM^2
BH CV ECHO MEAS - BSA(HAYCOCK): 1.9 M^2
BH CV ECHO MEAS - BSA: 1.9 M^2
BH CV ECHO MEAS - BZI_BMI: 30.4 KILOGRAMS/M^2
BH CV ECHO MEAS - BZI_METRIC_HEIGHT: 162.6 CM
BH CV ECHO MEAS - BZI_METRIC_WEIGHT: 80.3 KG
BH CV ECHO MEAS - EDV(CUBED): 80.1 ML
BH CV ECHO MEAS - EDV(MOD-SP2): 78.7 ML
BH CV ECHO MEAS - EDV(MOD-SP4): 85.9 ML
BH CV ECHO MEAS - EDV(TEICH): 83.5 ML
BH CV ECHO MEAS - EF(CUBED): 77.8 %
BH CV ECHO MEAS - EF(MOD-BP): 61 %
BH CV ECHO MEAS - EF(MOD-SP2): 61.4 %
BH CV ECHO MEAS - EF(MOD-SP4): 64.6 %
BH CV ECHO MEAS - EF(TEICH): 70.3 %
BH CV ECHO MEAS - ESV(CUBED): 17.8 ML
BH CV ECHO MEAS - ESV(MOD-SP2): 30.4 ML
BH CV ECHO MEAS - ESV(MOD-SP4): 30.4 ML
BH CV ECHO MEAS - ESV(TEICH): 24.8 ML
BH CV ECHO MEAS - FS: 39.4 %
BH CV ECHO MEAS - IVS/LVPW: 1.1
BH CV ECHO MEAS - IVSD: 1 CM
BH CV ECHO MEAS - LAT PEAK E' VEL: 14 CM/SEC
BH CV ECHO MEAS - LV DIASTOLIC VOL/BSA (35-75): 46.3 ML/M^2
BH CV ECHO MEAS - LV MASS(C)D: 135.4 GRAMS
BH CV ECHO MEAS - LV MASS(C)DI: 72.9 GRAMS/M^2
BH CV ECHO MEAS - LV MAX PG: 4.7 MMHG
BH CV ECHO MEAS - LV MEAN PG: 2 MMHG
BH CV ECHO MEAS - LV SYSTOLIC VOL/BSA (12-30): 16.4 ML/M^2
BH CV ECHO MEAS - LV V1 MAX: 108 CM/SEC
BH CV ECHO MEAS - LV V1 MEAN: 70.6 CM/SEC
BH CV ECHO MEAS - LV V1 VTI: 22.2 CM
BH CV ECHO MEAS - LVIDD: 4.3 CM
BH CV ECHO MEAS - LVIDS: 2.6 CM
BH CV ECHO MEAS - LVLD AP2: 7.1 CM
BH CV ECHO MEAS - LVLD AP4: 7.2 CM
BH CV ECHO MEAS - LVLS AP2: 6.3 CM
BH CV ECHO MEAS - LVLS AP4: 5.7 CM
BH CV ECHO MEAS - LVOT AREA (M): 2.5 CM^2
BH CV ECHO MEAS - LVOT AREA: 2.5 CM^2
BH CV ECHO MEAS - LVOT DIAM: 1.8 CM
BH CV ECHO MEAS - LVPWD: 0.9 CM
BH CV ECHO MEAS - MED PEAK E' VEL: 9 CM/SEC
BH CV ECHO MEAS - MV A DUR: 0.15 SEC
BH CV ECHO MEAS - MV A MAX VEL: 90.2 CM/SEC
BH CV ECHO MEAS - MV DEC SLOPE: 557 CM/SEC^2
BH CV ECHO MEAS - MV DEC TIME: 211 SEC
BH CV ECHO MEAS - MV E MAX VEL: 95.6 CM/SEC
BH CV ECHO MEAS - MV E/A: 1.1
BH CV ECHO MEAS - MV MAX PG: 4.6 MMHG
BH CV ECHO MEAS - MV MEAN PG: 2 MMHG
BH CV ECHO MEAS - MV P1/2T MAX VEL: 115 CM/SEC
BH CV ECHO MEAS - MV P1/2T: 60.5 MSEC
BH CV ECHO MEAS - MV V2 MAX: 107 CM/SEC
BH CV ECHO MEAS - MV V2 MEAN: 74 CM/SEC
BH CV ECHO MEAS - MV V2 VTI: 29.7 CM
BH CV ECHO MEAS - MVA P1/2T LCG: 1.9 CM^2
BH CV ECHO MEAS - MVA(P1/2T): 3.6 CM^2
BH CV ECHO MEAS - MVA(VTI): 1.9 CM^2
BH CV ECHO MEAS - PA ACC TIME: 0.07 SEC
BH CV ECHO MEAS - PA MAX PG (FULL): 1.9 MMHG
BH CV ECHO MEAS - PA MAX PG: 3.8 MMHG
BH CV ECHO MEAS - PA PR(ACCEL): 47.1 MMHG
BH CV ECHO MEAS - PA V2 MAX: 97.9 CM/SEC
BH CV ECHO MEAS - PULM A REVS DUR: 0.11 SEC
BH CV ECHO MEAS - PULM A REVS VEL: 31.5 CM/SEC
BH CV ECHO MEAS - PULM DIAS VEL: 35.4 CM/SEC
BH CV ECHO MEAS - PULM S/D: 2.1
BH CV ECHO MEAS - PULM SYS VEL: 73.7 CM/SEC
BH CV ECHO MEAS - PVA(V,A): 1.4 CM^2
BH CV ECHO MEAS - PVA(V,D): 1.4 CM^2
BH CV ECHO MEAS - QP/QS: 0.56
BH CV ECHO MEAS - RAP SYSTOLE: 8 MMHG
BH CV ECHO MEAS - RV MAX PG: 1.9 MMHG
BH CV ECHO MEAS - RV MEAN PG: 1 MMHG
BH CV ECHO MEAS - RV V1 MAX: 69.5 CM/SEC
BH CV ECHO MEAS - RV V1 MEAN: 51.4 CM/SEC
BH CV ECHO MEAS - RV V1 VTI: 15.8 CM
BH CV ECHO MEAS - RVOT AREA: 2 CM^2
BH CV ECHO MEAS - RVOT DIAM: 1.6 CM
BH CV ECHO MEAS - RVSP: 27.1 MMHG
BH CV ECHO MEAS - SI(AO): 92.6 ML/M^2
BH CV ECHO MEAS - SI(CUBED): 33.5 ML/M^2
BH CV ECHO MEAS - SI(LVOT): 30.4 ML/M^2
BH CV ECHO MEAS - SI(MOD-SP2): 26 ML/M^2
BH CV ECHO MEAS - SI(MOD-SP4): 29.9 ML/M^2
BH CV ECHO MEAS - SI(TEICH): 31.6 ML/M^2
BH CV ECHO MEAS - SV(AO): 172 ML
BH CV ECHO MEAS - SV(CUBED): 62.3 ML
BH CV ECHO MEAS - SV(LVOT): 56.5 ML
BH CV ECHO MEAS - SV(MOD-SP2): 48.3 ML
BH CV ECHO MEAS - SV(MOD-SP4): 55.5 ML
BH CV ECHO MEAS - SV(RVOT): 31.8 ML
BH CV ECHO MEAS - SV(TEICH): 58.7 ML
BH CV ECHO MEAS - TAPSE (>1.6): 2.3 CM
BH CV ECHO MEAS - TR MAX VEL: 218.5 CM/SEC
BH CV ECHO MEASUREMENTS AVERAGE E/E' RATIO: 8.31
BH CV STRESS BP STAGE 1: NORMAL
BH CV STRESS BP STAGE 2: NORMAL
BH CV STRESS BP STAGE 3: NORMAL
BH CV STRESS DURATION MIN STAGE 1: 3
BH CV STRESS DURATION MIN STAGE 2: 3
BH CV STRESS DURATION MIN STAGE 3: 1
BH CV STRESS DURATION SEC STAGE 1: 0
BH CV STRESS DURATION SEC STAGE 2: 0
BH CV STRESS DURATION SEC STAGE 3: 0
BH CV STRESS GRADE STAGE 1: 10
BH CV STRESS GRADE STAGE 2: 12
BH CV STRESS GRADE STAGE 3: 14
BH CV STRESS HR STAGE 1: 108
BH CV STRESS HR STAGE 2: 132
BH CV STRESS HR STAGE 3: 148
BH CV STRESS METS STAGE 1: 5
BH CV STRESS METS STAGE 2: 7.5
BH CV STRESS METS STAGE 3: 10
BH CV STRESS PROTOCOL 1: NORMAL
BH CV STRESS RECOVERY BP: NORMAL MMHG
BH CV STRESS RECOVERY HR: 77 BPM
BH CV STRESS SPEED STAGE 1: 1.7
BH CV STRESS SPEED STAGE 2: 2.5
BH CV STRESS SPEED STAGE 3: 3.4
BH CV STRESS STAGE 1: 1
BH CV STRESS STAGE 2: 2
BH CV STRESS STAGE 3: 3
BH CV VAS BP LEFT ARM: NORMAL MMHG
BH CV XLRA - RV BASE: 3 CM
BH CV XLRA - TDI S': 14 CM/SEC
LEFT ATRIUM VOLUME INDEX: 21 ML/M2
MAXIMAL PREDICTED HEART RATE: 164 BPM
MAXIMAL PREDICTED HEART RATE: 164 BPM
PERCENT MAX PREDICTED HR: 91.46 %
STRESS BASELINE BP: NORMAL MMHG
STRESS BASELINE HR: 70 BPM
STRESS O2 SAT REST: 99 %
STRESS PERCENT HR: 108 %
STRESS POST ESTIMATED WORKLOAD: 8.6 METS
STRESS POST EXERCISE DUR MIN: 7 MIN
STRESS POST EXERCISE DUR SEC: 0 SEC
STRESS POST O2 SAT PEAK: 99 %
STRESS POST PEAK BP: NORMAL MMHG
STRESS POST PEAK HR: 150 BPM
STRESS TARGET HR: 139 BPM
STRESS TARGET HR: 139 BPM

## 2019-12-04 PROCEDURE — 93306 TTE W/DOPPLER COMPLETE: CPT

## 2019-12-04 PROCEDURE — 93017 CV STRESS TEST TRACING ONLY: CPT

## 2019-12-04 PROCEDURE — 93018 CV STRESS TEST I&R ONLY: CPT | Performed by: INTERNAL MEDICINE

## 2019-12-04 PROCEDURE — 93016 CV STRESS TEST SUPVJ ONLY: CPT | Performed by: INTERNAL MEDICINE

## 2019-12-04 PROCEDURE — 93306 TTE W/DOPPLER COMPLETE: CPT | Performed by: INTERNAL MEDICINE

## 2019-12-18 ENCOUNTER — HOSPITAL ENCOUNTER (OUTPATIENT)
Dept: MRI IMAGING | Facility: HOSPITAL | Age: 54
Discharge: HOME | End: 2019-12-18
Admitting: INTERNAL MEDICINE

## 2019-12-18 DIAGNOSIS — M54.42 CHRONIC BILATERAL LOW BACK PAIN WITH LEFT-SIDED SCIATICA: ICD-10-CM

## 2019-12-18 DIAGNOSIS — G89.29 CHRONIC BILATERAL LOW BACK PAIN WITH LEFT-SIDED SCIATICA: ICD-10-CM

## 2019-12-18 PROCEDURE — 72148 MRI LUMBAR SPINE W/O DYE: CPT

## 2020-01-31 ENCOUNTER — TELEPHONE (OUTPATIENT)
Dept: OBSTETRICS AND GYNECOLOGY | Facility: CLINIC | Age: 55
End: 2020-01-31

## 2020-02-13 ENCOUNTER — OFFICE VISIT (OUTPATIENT)
Dept: OBSTETRICS AND GYNECOLOGY | Facility: CLINIC | Age: 55
End: 2020-02-13

## 2020-02-13 VITALS
WEIGHT: 182.5 LBS | HEIGHT: 64 IN | SYSTOLIC BLOOD PRESSURE: 110 MMHG | BODY MASS INDEX: 31.16 KG/M2 | DIASTOLIC BLOOD PRESSURE: 60 MMHG

## 2020-02-13 DIAGNOSIS — Z13.9 SCREENING FOR CONDITION: ICD-10-CM

## 2020-02-13 DIAGNOSIS — N89.8 VAGINAL DISCHARGE: Primary | ICD-10-CM

## 2020-02-13 DIAGNOSIS — N95.2 VAGINAL ATROPHY: ICD-10-CM

## 2020-02-13 PROBLEM — Z12.39 SCREENING FOR BREAST CANCER: Status: RESOLVED | Noted: 2017-10-17 | Resolved: 2020-02-13

## 2020-02-13 LAB
BILIRUB BLD-MCNC: NEGATIVE MG/DL
CLARITY, POC: CLEAR
COLOR UR: YELLOW
GLUCOSE UR STRIP-MCNC: NEGATIVE MG/DL
KETONES UR QL: NEGATIVE
LEUKOCYTE EST, POC: NEGATIVE
NITRITE UR-MCNC: NEGATIVE MG/ML
PH UR: 5 [PH] (ref 5–8)
PROT UR STRIP-MCNC: NEGATIVE MG/DL
RBC # UR STRIP: NEGATIVE /UL
SP GR UR: 1.02 (ref 1–1.03)
UROBILINOGEN UR QL: NORMAL

## 2020-02-13 PROCEDURE — 81002 URINALYSIS NONAUTO W/O SCOPE: CPT | Performed by: OBSTETRICS & GYNECOLOGY

## 2020-02-13 PROCEDURE — 99214 OFFICE O/P EST MOD 30 MIN: CPT | Performed by: OBSTETRICS & GYNECOLOGY

## 2020-02-13 RX ORDER — VORTIOXETINE 10 MG/1
TABLET, FILM COATED ORAL
COMMUNITY
Start: 2020-01-12 | End: 2022-10-21

## 2020-02-13 RX ORDER — ESTRADIOL 0.1 MG/G
1 CREAM VAGINAL 2 TIMES WEEKLY
Qty: 45 G | Refills: 6 | Status: SHIPPED | OUTPATIENT
Start: 2020-02-13 | End: 2020-08-14 | Stop reason: SDUPTHER

## 2020-02-13 NOTE — PROGRESS NOTES
"      Neville Rodriges is a 54 y.o. patient who presents for follow up of   Chief Complaint   Patient presents with   • Follow-up     Vaginal pain, dryness, and itching     53 yo est pt here for emergency appt for vaginal irritation, discharge and itching. She was on vacation in [place] a few weeks ago and had severe vaginal itching. She went to urgent care there and was given Diflucan and it helped some but she still has symptoms. She has some external itching and some slight discharge. She denies any VB. She has a dx of LS and vaginal atrophy but has not been using her E2 cream very often. She has one area adjacent to the L clitoral glynn that burns after sex. Her itching is generalized and not focal.         The following portions of the patient's history were reviewed and updated as appropriate: allergies, current medications and problem list.    Review of Systems   Constitutional: Negative for appetite change, fatigue, fever and unexpected weight change.   Eyes: Negative for photophobia and visual disturbance.   Respiratory: Negative for cough and shortness of breath.    Cardiovascular: Negative for chest pain and palpitations.   Gastrointestinal: Negative for abdominal distention, abdominal pain, constipation, diarrhea and nausea.   Endocrine: Negative for cold intolerance and heat intolerance.   Genitourinary: Positive for genital sores, vaginal discharge and vaginal pain. Negative for dyspareunia, dysuria, menstrual problem and pelvic pain.   Musculoskeletal: Negative for back pain.   Skin: Negative for color change and rash.   Neurological: Negative for headaches.   Hematological: Negative for adenopathy. Does not bruise/bleed easily.   Psychiatric/Behavioral: Negative for dysphoric mood. The patient is not nervous/anxious.        /60   Ht 162.6 cm (64.02\")   Wt 82.8 kg (182 lb 8 oz)   LMP  (LMP Unknown) Comment: partial hysterectomy   Breastfeeding No   BMI 31.31 kg/m²     Physical Exam "   Constitutional: She is oriented to person, place, and time. She appears well-developed and well-nourished.   HENT:   Head: Normocephalic and atraumatic.   Eyes: Conjunctivae are normal. No scleral icterus.   Abdominal: Soft. She exhibits no distension and no mass. There is no tenderness. There is no rebound and no guarding. No hernia.   Genitourinary:       Pelvic exam was performed with patient supine. There is no rash, tenderness, lesion or injury on the right labia. There is tenderness and lesion on the left labia. There is no rash or injury on the left labia. Right adnexum displays no mass, no tenderness and no fullness. Left adnexum displays no mass, no tenderness and no fullness. No erythema, tenderness or bleeding in the vagina. No foreign body in the vagina. No signs of injury around the vagina. Vaginal discharge found.   Genitourinary Comments: Non specific d/c in vault   Neurological: She is alert and oriented to person, place, and time.   Skin: Skin is warm and dry.   Psychiatric: She has a normal mood and affect. Her behavior is normal. Judgment and thought content normal.   Nursing note and vitals reviewed.      A/P:  1.  Vaginal discharge- check NuSwab Y/L/M/B  2. Vaginal atrophy- enc pt to use E2 cream x 2/week for 2 months to include the area on L clitoral glynn. Patient counseled that vaginal estrogen rings, creams and tablets are available and highly effective at treating local vaginal symptoms such as atrophy and vaginal dryness.  Vaginal estrogen does not cause uterine overgrowth and does not require a progestogen to protect the uterus.  Very small amounts of estrogen are absorbed systemically.  For patients with a history of an estrogen dependent cancer such as breast cancer, the decision to use local estrogen for local vaginal symptoms should be made after consultation with her oncologist.  Possible side effects include local irritation or burning and/or vaginal bleeding and should always be  reported.    3.Clitoral erythema- if area does not improve in 2 months, consider bx. Use aquaphor as a barrier if needed.  4. UTD MMG 11/2019- B2  5. RTO 2 months annual and recheck vulva.     Assessment/Plan   Neville was seen today for follow-up.    Diagnoses and all orders for this visit:    Vaginal discharge  -     NuSwab VG+ - Swab, Vagina  -     Genital Mycoplasmas HEIDI, Swab - Swab, Vagina    Screening for condition  -     POC Urinalysis Dipstick    Vaginal atrophy    Other orders  -     estradiol (ESTRACE) 0.1 MG/GM vaginal cream; Insert 1 g into the vagina 2 (Two) Times a Week.                 No follow-ups on file.      Mei Padilla MD    2/13/2020  12:26 PM

## 2020-02-18 LAB
A VAGINAE DNA VAG QL NAA+PROBE: NORMAL SCORE
BVAB2 DNA VAG QL NAA+PROBE: NORMAL SCORE
C ALBICANS DNA VAG QL NAA+PROBE: NEGATIVE
C GLABRATA DNA VAG QL NAA+PROBE: NEGATIVE
C TRACH RRNA SPEC QL NAA+PROBE: NEGATIVE
M GENITALIUM DNA SPEC QL NAA+PROBE: NEGATIVE
M HOMINIS DNA SPEC QL NAA+PROBE: NEGATIVE
MEGA1 DNA VAG QL NAA+PROBE: NORMAL SCORE
N GONORRHOEA RRNA SPEC QL NAA+PROBE: NEGATIVE
T VAGINALIS RRNA SPEC QL NAA+PROBE: NEGATIVE
UREAPLASMA DNA SPEC QL NAA+PROBE: NEGATIVE

## 2020-05-07 ENCOUNTER — HOSPITAL ENCOUNTER (EMERGENCY)
Facility: HOSPITAL | Age: 55
Discharge: HOME | End: 2020-05-07
Attending: EMERGENCY MEDICINE | Admitting: EMERGENCY MEDICINE

## 2020-05-07 ENCOUNTER — APPOINTMENT (OUTPATIENT)
Dept: CT IMAGING | Facility: HOSPITAL | Age: 55
End: 2020-05-07

## 2020-05-07 VITALS
HEART RATE: 59 BPM | BODY MASS INDEX: 29.53 KG/M2 | RESPIRATION RATE: 15 BRPM | WEIGHT: 173 LBS | HEIGHT: 64 IN | OXYGEN SATURATION: 98 % | SYSTOLIC BLOOD PRESSURE: 125 MMHG | DIASTOLIC BLOOD PRESSURE: 92 MMHG | TEMPERATURE: 98.4 F

## 2020-05-07 DIAGNOSIS — R10.13 EPIGASTRIC PAIN: Primary | ICD-10-CM

## 2020-05-07 LAB
ALBUMIN SERPL-MCNC: 4.4 G/DL (ref 3.5–5.2)
ALBUMIN/GLOB SERPL: 1.7 G/DL
ALP SERPL-CCNC: 83 U/L (ref 39–117)
ALT SERPL W P-5'-P-CCNC: 22 U/L (ref 1–33)
ANION GAP SERPL CALCULATED.3IONS-SCNC: 14.8 MMOL/L (ref 5–15)
AST SERPL-CCNC: 42 U/L (ref 1–32)
BASOPHILS # BLD AUTO: 0.04 10*3/MM3 (ref 0–0.2)
BASOPHILS NFR BLD AUTO: 0.7 % (ref 0–1.5)
BILIRUB SERPL-MCNC: 0.5 MG/DL (ref 0.2–1.2)
BILIRUB UR QL STRIP: NEGATIVE
BUN BLD-MCNC: 18 MG/DL (ref 6–20)
BUN/CREAT SERPL: 20.5 (ref 7–25)
CALCIUM SPEC-SCNC: 9 MG/DL (ref 8.6–10.5)
CHLORIDE SERPL-SCNC: 96 MMOL/L (ref 98–107)
CLARITY UR: CLEAR
CO2 SERPL-SCNC: 26.2 MMOL/L (ref 22–29)
COLOR UR: YELLOW
CREAT BLD-MCNC: 0.88 MG/DL (ref 0.57–1)
DEPRECATED RDW RBC AUTO: 41.9 FL (ref 37–54)
EOSINOPHIL # BLD AUTO: 0.01 10*3/MM3 (ref 0–0.4)
EOSINOPHIL NFR BLD AUTO: 0.2 % (ref 0.3–6.2)
ERYTHROCYTE [DISTWIDTH] IN BLOOD BY AUTOMATED COUNT: 12.8 % (ref 12.3–15.4)
GFR SERPL CREATININE-BSD FRML MDRD: 66 ML/MIN/1.73
GLOBULIN UR ELPH-MCNC: 2.6 GM/DL
GLUCOSE BLD-MCNC: 116 MG/DL (ref 65–99)
GLUCOSE UR STRIP-MCNC: NEGATIVE MG/DL
HCT VFR BLD AUTO: 41.8 % (ref 34–46.6)
HGB BLD-MCNC: 13.7 G/DL (ref 12–15.9)
HGB UR QL STRIP.AUTO: NEGATIVE
IMM GRANULOCYTES # BLD AUTO: 0.02 10*3/MM3 (ref 0–0.05)
IMM GRANULOCYTES NFR BLD AUTO: 0.3 % (ref 0–0.5)
KETONES UR QL STRIP: NEGATIVE
LEUKOCYTE ESTERASE UR QL STRIP.AUTO: NEGATIVE
LIPASE SERPL-CCNC: 39 U/L (ref 13–60)
LYMPHOCYTES # BLD AUTO: 1.78 10*3/MM3 (ref 0.7–3.1)
LYMPHOCYTES NFR BLD AUTO: 29.9 % (ref 19.6–45.3)
MCH RBC QN AUTO: 29.1 PG (ref 26.6–33)
MCHC RBC AUTO-ENTMCNC: 32.8 G/DL (ref 31.5–35.7)
MCV RBC AUTO: 88.7 FL (ref 79–97)
MONOCYTES # BLD AUTO: 0.43 10*3/MM3 (ref 0.1–0.9)
MONOCYTES NFR BLD AUTO: 7.2 % (ref 5–12)
NEUTROPHILS # BLD AUTO: 3.68 10*3/MM3 (ref 1.7–7)
NEUTROPHILS NFR BLD AUTO: 61.7 % (ref 42.7–76)
NITRITE UR QL STRIP: NEGATIVE
NRBC BLD AUTO-RTO: 0 /100 WBC (ref 0–0.2)
PH UR STRIP.AUTO: <=5 [PH] (ref 4.5–8)
PLATELET # BLD AUTO: 243 10*3/MM3 (ref 140–450)
PMV BLD AUTO: 9.8 FL (ref 6–12)
POTASSIUM BLD-SCNC: 3.8 MMOL/L (ref 3.5–5.2)
PROT SERPL-MCNC: 7 G/DL (ref 6–8.5)
PROT UR QL STRIP: NEGATIVE
RBC # BLD AUTO: 4.71 10*6/MM3 (ref 3.77–5.28)
SODIUM BLD-SCNC: 137 MMOL/L (ref 136–145)
SP GR UR STRIP: 1.02 (ref 1–1.03)
TROPONIN T SERPL-MCNC: <0.01 NG/ML (ref 0–0.03)
UROBILINOGEN UR QL STRIP: NORMAL
WBC NRBC COR # BLD: 5.96 10*3/MM3 (ref 3.4–10.8)

## 2020-05-07 PROCEDURE — 99284 EMERGENCY DEPT VISIT MOD MDM: CPT | Performed by: PHYSICIAN ASSISTANT

## 2020-05-07 PROCEDURE — 99284 EMERGENCY DEPT VISIT MOD MDM: CPT

## 2020-05-07 PROCEDURE — 80053 COMPREHEN METABOLIC PANEL: CPT | Performed by: PHYSICIAN ASSISTANT

## 2020-05-07 PROCEDURE — 96375 TX/PRO/DX INJ NEW DRUG ADDON: CPT

## 2020-05-07 PROCEDURE — 25010000002 KETOROLAC TROMETHAMINE PER 15 MG: Performed by: PHYSICIAN ASSISTANT

## 2020-05-07 PROCEDURE — 83690 ASSAY OF LIPASE: CPT | Performed by: PHYSICIAN ASSISTANT

## 2020-05-07 PROCEDURE — 74176 CT ABD & PELVIS W/O CONTRAST: CPT

## 2020-05-07 PROCEDURE — 25010000002 FENTANYL CITRATE (PF) 100 MCG/2ML SOLUTION: Performed by: EMERGENCY MEDICINE

## 2020-05-07 PROCEDURE — 96374 THER/PROPH/DIAG INJ IV PUSH: CPT

## 2020-05-07 PROCEDURE — 25010000002 ONDANSETRON PER 1 MG: Performed by: PHYSICIAN ASSISTANT

## 2020-05-07 PROCEDURE — 84484 ASSAY OF TROPONIN QUANT: CPT | Performed by: PHYSICIAN ASSISTANT

## 2020-05-07 PROCEDURE — 81003 URINALYSIS AUTO W/O SCOPE: CPT | Performed by: PHYSICIAN ASSISTANT

## 2020-05-07 PROCEDURE — 85025 COMPLETE CBC W/AUTO DIFF WBC: CPT | Performed by: PHYSICIAN ASSISTANT

## 2020-05-07 RX ORDER — KETOROLAC TROMETHAMINE 30 MG/ML
30 INJECTION, SOLUTION INTRAMUSCULAR; INTRAVENOUS ONCE
Status: COMPLETED | OUTPATIENT
Start: 2020-05-07 | End: 2020-05-07

## 2020-05-07 RX ORDER — FAMOTIDINE 20 MG/1
20 TABLET, FILM COATED ORAL ONCE
Status: COMPLETED | OUTPATIENT
Start: 2020-05-07 | End: 2020-05-07

## 2020-05-07 RX ORDER — FENTANYL CITRATE 50 UG/ML
25 INJECTION, SOLUTION INTRAMUSCULAR; INTRAVENOUS ONCE
Status: COMPLETED | OUTPATIENT
Start: 2020-05-07 | End: 2020-05-07

## 2020-05-07 RX ORDER — LIDOCAINE HYDROCHLORIDE 20 MG/ML
15 SOLUTION OROPHARYNGEAL ONCE
Status: COMPLETED | OUTPATIENT
Start: 2020-05-07 | End: 2020-05-07

## 2020-05-07 RX ORDER — ALUMINA, MAGNESIA, AND SIMETHICONE 2400; 2400; 240 MG/30ML; MG/30ML; MG/30ML
15 SUSPENSION ORAL ONCE
Status: COMPLETED | OUTPATIENT
Start: 2020-05-07 | End: 2020-05-07

## 2020-05-07 RX ORDER — ONDANSETRON 2 MG/ML
4 INJECTION INTRAMUSCULAR; INTRAVENOUS ONCE
Status: COMPLETED | OUTPATIENT
Start: 2020-05-07 | End: 2020-05-07

## 2020-05-07 RX ORDER — ONDANSETRON 4 MG/1
4 TABLET, ORALLY DISINTEGRATING ORAL 4 TIMES DAILY PRN
Qty: 20 TABLET | Refills: 0 | Status: SHIPPED | OUTPATIENT
Start: 2020-05-07 | End: 2021-06-21

## 2020-05-07 RX ORDER — SUCRALFATE 1 G/1
1 TABLET ORAL 4 TIMES DAILY
Qty: 40 TABLET | Refills: 0 | Status: SHIPPED | OUTPATIENT
Start: 2020-05-07 | End: 2020-05-17

## 2020-05-07 RX ORDER — SODIUM CHLORIDE 0.9 % (FLUSH) 0.9 %
10 SYRINGE (ML) INJECTION AS NEEDED
Status: DISCONTINUED | OUTPATIENT
Start: 2020-05-07 | End: 2020-05-07 | Stop reason: HOSPADM

## 2020-05-07 RX ADMIN — ONDANSETRON 4 MG: 2 INJECTION, SOLUTION INTRAMUSCULAR; INTRAVENOUS at 19:58

## 2020-05-07 RX ADMIN — KETOROLAC TROMETHAMINE 30 MG: 30 INJECTION, SOLUTION INTRAMUSCULAR at 21:07

## 2020-05-07 RX ADMIN — ALUMINUM HYDROXIDE, MAGNESIUM HYDROXIDE, AND DIMETHICONE 15 ML: 400; 400; 40 SUSPENSION ORAL at 21:06

## 2020-05-07 RX ADMIN — LIDOCAINE HYDROCHLORIDE 15 ML: 20 SOLUTION ORAL; TOPICAL at 21:06

## 2020-05-07 RX ADMIN — FENTANYL CITRATE 25 MCG: 50 INJECTION, SOLUTION INTRAMUSCULAR; INTRAVENOUS at 19:57

## 2020-05-07 RX ADMIN — FAMOTIDINE 20 MG: 20 TABLET ORAL at 21:06

## 2020-05-07 NOTE — ED PROVIDER NOTES
"Subjective   History of Present Illness  History of Present Illness    Chief complaint: abd pain    Location: mid epigastric with radiation to back    Quality/Severity:  \"like my previous pancreatitis\". 10/10    Timing/Duration: 1630. constant    Modifying Factors: began after taking SL morphine (left over from her father who was in hospice). Nothing makes better.     Associated Symptoms: +n/-v/-d/-c.  Denies fevers or chills.  Denies chest pain or shortness of breath.  Denies cough.  Denies dysuria, hematuria, frequency urgency.    Narrative: 54-year-old female, with a history of prior pancreatitis 2/2 medications, presents with similar symptoms as above.  She had dental extractions earlier today, and when the numbing medicine wore off she was having a significant amount of pain.  She took her father's old morphine prescription.  She stated that the pain began immediately after that.  She thinks that induced pancreatitis.  She denies any trauma or sick contacts.    Review of Systems   Constitutional: Negative.  Negative for chills and fever.   HENT: Negative.    Respiratory: Negative.  Negative for cough and shortness of breath.    Cardiovascular: Negative.  Negative for chest pain.   Gastrointestinal: Positive for abdominal pain and nausea. Negative for constipation, diarrhea and vomiting.   Genitourinary: Negative.  Negative for dysuria, frequency, hematuria and urgency.   Musculoskeletal: Negative.    Skin: Negative.    Neurological: Negative.  Negative for dizziness, syncope and headaches.   Hematological: Negative.    Psychiatric/Behavioral: Negative.    All other systems reviewed and are negative.      Past Medical History:   Diagnosis Date   • Colon polyps     c-scope was fall 2014. due for repeat in 2-3 years.   • Deep vein thrombosis (CMS/HCC)    • Depression    • Gastroesophageal reflux disease 2/28/2017   • Headache     More problems when big fluctuations in weather   • History of frequent urinary tract " infections    • Humerus fracture    • Hypercholesterolemia     High Triglycerides   • Hypertension    • Migraine    • Pancreatitis    • Plantar fasciitis    • Sleep apnea    • Urinary tract infection        Allergies   Allergen Reactions   • Paroxetine Swelling     throat   • Cefuroxime Axetil Rash   • Cephalosporins Rash   • Ciprofloxacin Rash   • Metronidazole Rash   • Paroxetine Hcl Rash       Past Surgical History:   Procedure Laterality Date   • APPENDECTOMY     • CHOLECYSTECTOMY     • COLONOSCOPY     • HYSTERECTOMY      Partial   • SINUS SURGERY     • STOMACH SURGERY  12/10/2013    Gastric sleve   • TONSILLECTOMY         Family History   Problem Relation Age of Onset   • Diabetes Mother    • Anxiety disorder Mother    • Depression Mother    • Diabetes Father    • Heart disease Father         CABG x 3 in early 60s   • Hypertension Father    • Anxiety disorder Sister    • Depression Sister    • Cancer Daughter         Synovial Cell Sarcoma   • Breast cancer Neg Hx    • Ovarian cancer Neg Hx    • Colon cancer Neg Hx        Social History     Socioeconomic History   • Marital status:      Spouse name: Not on file   • Number of children: Not on file   • Years of education: Not on file   • Highest education level: Not on file   Tobacco Use   • Smoking status: Never Smoker   • Smokeless tobacco: Never Used   Substance and Sexual Activity   • Alcohol use: Yes     Alcohol/week: 1.0 standard drinks     Types: 1 Glasses of wine per week     Comment: 2 X PER MONTH    • Drug use: No   • Sexual activity: Yes     Partners: Male     Birth control/protection: Post-menopausal, Surgical     Comment: hysterectomy       Current Facility-Administered Medications:   •  fentaNYL citrate (PF) (SUBLIMAZE) injection 25 mcg, 25 mcg, Intravenous, Once, Zak Leger MD  •  ondansetron (ZOFRAN) injection 4 mg, 4 mg, Intravenous, Once, Genie Montgomery PA-C  •  Insert peripheral IV, , , Once **AND** sodium chloride 0.9 % flush  10 mL, 10 mL, Intravenous, PRN, Genie Montgomery, PAZ    Current Outpatient Medications:   •  albuterol (PROVENTIL HFA;VENTOLIN HFA) 108 (90 BASE) MCG/ACT inhaler, Ventolin  (90 Base) MCG/ACT Inhalation Aerosol Solution; Patient Sig: Ventolin  (90 Base) MCG/ACT Inhalation Aerosol Solution INHALE 2-4 PUFFS Q 4 HOURS PRN/COUGH/WHEEZE; 1; 0; 21-Aug-2015; Active, Disp: , Rfl:   •  ALPRAZolam XR (XANAX XR) 0.5 MG 24 hr tablet, Take 1 tablet by mouth Daily As Needed for Anxiety., Disp: 30 tablet, Rfl: 0  •  azelastine (ASTEPRO) 0.15 % solution nasal spray, 2 sprays into each nostril 2 (Two) Times a Day. (Patient taking differently: 2 sprays into the nostril(s) as directed by provider 2 (Two) Times a Day As Needed.), Disp: 30 mL, Rfl: 1  •  Cholecalciferol (VITAMIN D3) 27737 units tablet, chew one tablet by mouth every day WITH FOOD, Disp: , Rfl: 1  •  estradiol (ESTRACE) 0.1 MG/GM vaginal cream, Insert 1 g into the vagina 2 (Two) Times a Week., Disp: 45 g, Rfl: 6  •  losartan-hydrochlorothiazide (HYZAAR) 100-12.5 MG per tablet, Take 1 tablet by mouth Daily., Disp: 90 tablet, Rfl: 3  •  omeprazole (priLOSEC) 20 MG capsule, Take 1 capsule by mouth Daily., Disp: 90 capsule, Rfl: 3  •  TRINTELLIX 10 MG tablet, , Disp: , Rfl:         Objective   Physical Exam  Vitals:    05/07/20 1929   BP: 155/83   Pulse: 95   Resp: 20   Temp: 97.9 °F (36.6 °C)   SpO2: 98%     GENERAL: a/o x 4, appears uncomfortable.  Standing up bending over the bed.  SKIN: Warm pink and dry   HEENT:  PERRLA, EOM intact, conjunctiva normal, sclera clear  NECK: supple  LUNGS: Clear to auscultation bilaterally without wheezes, rales or rhonchi.  No accessory muscle use and no nasal flaring.  CARDIAC:  Regular rate and rhythm, S1-S2.  No murmurs, rubs or gallops.  No peripheral edema.  Equal pulses bilaterally.  ABDOMEN: Soft, diffusely tender upper abdomen.  Nondistended.  No guarding or rebound tenderness.  Normal bowel sounds.  No CVA  tenderness.  MUSCULOSKELETAL: Moves all extremities well.  No deformity.  NEURO: Cranial nerves II through XII grossly intact.  No gross focal deficits.  Alert.  Normal speech and motor.  PSYCH: Normal mood and affect      Procedures           ED Course  ED Course as of May 07 2129   Thu May 07, 2020   1938 Fentanyl and Zofran given    [KY]   2056 Pt states she is still having some discomfort, although improved.  Will give toradol and po pepcid and gi cocktail    [KY]   2128 Patient is feeling much better.  Her pain has resolved.  She feels ready to go home.  She is already on a PPI.  We will add sucralfate and Zofran.  Educated patient.    Discussed pertinent labs and imaging findings with the patient/family.  Patient/Family voiced understanding of need to follow-up for recheck, further testing as needed.  Return to the emergency Department warnings were given.      [KY]      ED Course User Index  [KY] Genie Montgomery PA-C      Reviewed CT a/p. Independently viewed by me. Interpreted by radiologist. Discussed with pt.  Ct Abdomen Pelvis Without Contrast    Result Date: 5/7/2020  Narrative: CT Abdomen Pelvis WO INDICATION: Epigastric pain and nausea beginning 3 hours prior to arrival TECHNIQUE: CT of the abdomen and pelvis without IV contrast. Coronal and sagittal reconstructions were obtained.  Radiation dose reduction techniques included automated exposure control or exposure modulation based on body size. Count of known CT and cardiac nuc med studies performed in previous 12 months: 0. COMPARISON: 8/18/2018 FINDINGS: Abdomen: The liver, spleen and pancreas are unchanged from the previous examination. In particular, there is no evidence for acute pancreatitis by CT criteria. No evidence of kidney stone disease or obstruction. No adrenal masses or retroperitoneal adenopathy. Postoperative changes are seen in the stomach with gastric sleeve surgery and there is a small sliding hiatal hernia. There are no distended  bowel loops. There is no evidence of free air in the peritoneal cavity. No ascites. Pelvis: No evidence of adenopathy, mass or fluid collection.     Impression: No acute or inflammatory changes in the abdomen or pelvis. In particular, no evidence of acute pancreatitis by CT criteria. Signer Name: Josiah Jimenez MD  Signed: 5/7/2020 8:42 PM  Workstation Name: [id number]  [facility]        Results for orders placed or performed during the hospital encounter of 05/07/20   Urinalysis With Culture If Indicated - Urine, Clean Catch   Result Value Ref Range    Color, UA Yellow Yellow, Straw    Appearance, UA Clear Clear    pH, UA <=5.0 4.5 - 8.0    Specific Gravity, UA 1.024 1.003 - 1.030    Glucose, UA Negative Negative    Ketones, UA Negative Negative    Bilirubin, UA Negative Negative    Blood, UA Negative Negative    Protein, UA Negative Negative    Leuk Esterase, UA Negative Negative    Nitrite, UA Negative Negative    Urobilinogen, UA 0.2 E.U./dL 0.2 - 1.0 E.U./dL   Comprehensive Metabolic Panel   Result Value Ref Range    Glucose 116 (H) 65 - 99 mg/dL    BUN 18 6 - 20 mg/dL    Creatinine 0.88 0.57 - 1.00 mg/dL    Sodium 137 136 - 145 mmol/L    Potassium 3.8 3.5 - 5.2 mmol/L    Chloride 96 (L) 98 - 107 mmol/L    CO2 26.2 22.0 - 29.0 mmol/L    Calcium 9.0 8.6 - 10.5 mg/dL    Total Protein 7.0 6.0 - 8.5 g/dL    Albumin 4.40 3.50 - 5.20 g/dL    ALT (SGPT) 22 1 - 33 U/L    AST (SGOT) 42 (H) 1 - 32 U/L    Alkaline Phosphatase 83 39 - 117 U/L    Total Bilirubin 0.5 0.2 - 1.2 mg/dL    eGFR Non African Amer 66 >60 mL/min/1.73    Globulin 2.6 gm/dL    A/G Ratio 1.7 g/dL    BUN/Creatinine Ratio 20.5 7.0 - 25.0    Anion Gap 14.8 5.0 - 15.0 mmol/L   Lipase   Result Value Ref Range    Lipase 39 13 - 60 U/L   Troponin   Result Value Ref Range    Troponin T <0.010 0.000 - 0.030 ng/mL   CBC Auto Differential   Result Value Ref Range    WBC 5.96 3.40 - 10.80 10*3/mm3    RBC 4.71 3.77 - 5.28 10*6/mm3     Hemoglobin 13.7 12.0 - 15.9 g/dL    Hematocrit 41.8 34.0 - 46.6 %    MCV 88.7 79.0 - 97.0 fL    MCH 29.1 26.6 - 33.0 pg    MCHC 32.8 31.5 - 35.7 g/dL    RDW 12.8 12.3 - 15.4 %    RDW-SD 41.9 37.0 - 54.0 fl    MPV 9.8 6.0 - 12.0 fL    Platelets 243 140 - 450 10*3/mm3    Neutrophil % 61.7 42.7 - 76.0 %    Lymphocyte % 29.9 19.6 - 45.3 %    Monocyte % 7.2 5.0 - 12.0 %    Eosinophil % 0.2 (L) 0.3 - 6.2 %    Basophil % 0.7 0.0 - 1.5 %    Immature Grans % 0.3 0.0 - 0.5 %    Neutrophils, Absolute 3.68 1.70 - 7.00 10*3/mm3    Lymphocytes, Absolute 1.78 0.70 - 3.10 10*3/mm3    Monocytes, Absolute 0.43 0.10 - 0.90 10*3/mm3    Eosinophils, Absolute 0.01 0.00 - 0.40 10*3/mm3    Basophils, Absolute 0.04 0.00 - 0.20 10*3/mm3    Immature Grans, Absolute 0.02 0.00 - 0.05 10*3/mm3    nRBC 0.0 0.0 - 0.2 /100 WBC               MDM  Number of Diagnoses or Management Options  Epigastric pain: new and requires workup     Amount and/or Complexity of Data Reviewed  Clinical lab tests: reviewed and ordered  Tests in the radiology section of CPT®: reviewed and ordered  Tests in the medicine section of CPT®: reviewed and ordered  Independent visualization of images, tracings, or specimens: yes    Risk of Complications, Morbidity, and/or Mortality  Presenting problems: moderate  Diagnostic procedures: moderate  Management options: high    Patient Progress  Patient progress: improved    My differential diagnosis for abdominal pain includes but is not limited to:  Gastritis, gastroenteritis, peptic ulcer disease, GERD, esophageal perforation, acute appendicitis, mesenteric adenitis, Meckel’s diverticulum, epiploic appendagitis, diverticulitis, colon cancer, ulcerative colitis, Crohn’s disease, intussusception, small bowel obstruction, adhesions, ischemic bowel, perforated viscus, ileus, obstipation, biliary colic, cholecystitis, cholelithiasis, Ankush-Sarthak Kyle, hepatitis, pancreatitis, common bile duct obstruction, cholangitis, bile leak,  splenic trauma, splenic rupture, splenic infarction, splenic abscess, abdominal abscess, ascites, spontaneous bacterial peritonitis, hernia, UTI, cystitis,ureterolithiasis, urinary obstruction, ovarian cyst, torsion,   PID, pelvic abscess, mittelschmerz, endometriosis, AAA, myocardial infarction, pneumonia, cancer, porphyria, DKA, medications, sickle cell, viral syndrome, zoster    Final diagnoses:   Epigastric pain       Dictated utilizing Dragon dictation         Genie Montgomery, PA-C  05/07/20 7900

## 2020-05-08 NOTE — ED NOTES
"Patient presents to the ER c/o pain that began after she took a sublingual dose of morphine. She states, \"I don't know why I can't take pain medicine. I went to the dentist today to have my teeth grinded down and I got home and I was in so much pain. I took care of my dad when he was sick in hospice so I still had some of his morphine left and I thought taking one sublingual dose wouldn't hurt but it did. I'm having a flare up of pancreatitis. The last time I was here it happened after I took naproxen.\"    Patient rates pain 11/10. Will continue to assess.      Domitila Rae RN  05/07/20 2031    "

## 2020-08-11 ENCOUNTER — OFFICE VISIT (OUTPATIENT)
Dept: INTERNAL MEDICINE | Facility: CLINIC | Age: 55
End: 2020-08-11

## 2020-08-11 VITALS
OXYGEN SATURATION: 98 % | HEIGHT: 64 IN | TEMPERATURE: 97.6 F | DIASTOLIC BLOOD PRESSURE: 68 MMHG | WEIGHT: 171.2 LBS | RESPIRATION RATE: 16 BRPM | HEART RATE: 85 BPM | BODY MASS INDEX: 29.23 KG/M2 | SYSTOLIC BLOOD PRESSURE: 110 MMHG

## 2020-08-11 DIAGNOSIS — I10 ESSENTIAL HYPERTENSION: ICD-10-CM

## 2020-08-11 DIAGNOSIS — K21.9 GASTROESOPHAGEAL REFLUX DISEASE, ESOPHAGITIS PRESENCE NOT SPECIFIED: ICD-10-CM

## 2020-08-11 DIAGNOSIS — F41.9 ANXIETY: ICD-10-CM

## 2020-08-11 DIAGNOSIS — G25.81 RESTLESS LEGS: Primary | ICD-10-CM

## 2020-08-11 PROCEDURE — 99214 OFFICE O/P EST MOD 30 MIN: CPT | Performed by: INTERNAL MEDICINE

## 2020-08-11 RX ORDER — ROPINIROLE 0.25 MG/1
0.25 TABLET, FILM COATED ORAL NIGHTLY PRN
Qty: 30 TABLET | Refills: 1 | Status: SHIPPED | OUTPATIENT
Start: 2020-08-11 | End: 2021-06-21

## 2020-08-11 RX ORDER — ALPRAZOLAM 0.5 MG/1
0.5 TABLET, EXTENDED RELEASE ORAL DAILY PRN
Qty: 30 TABLET | Refills: 2 | Status: SHIPPED | OUTPATIENT
Start: 2020-08-11 | End: 2021-06-21 | Stop reason: SDUPTHER

## 2020-08-11 RX ORDER — OMEPRAZOLE 20 MG/1
20 CAPSULE, DELAYED RELEASE ORAL DAILY
Qty: 90 CAPSULE | Refills: 3 | Status: SHIPPED | OUTPATIENT
Start: 2020-08-11 | End: 2020-08-14 | Stop reason: SDUPTHER

## 2020-08-11 RX ORDER — LOSARTAN POTASSIUM AND HYDROCHLOROTHIAZIDE 12.5; 1 MG/1; MG/1
1 TABLET ORAL DAILY
Qty: 90 TABLET | Refills: 3 | Status: SHIPPED | OUTPATIENT
Start: 2020-08-11 | End: 2020-08-14 | Stop reason: SDUPTHER

## 2020-08-11 NOTE — PROGRESS NOTES
Neville Rodriges is a 55 y.o. female, who presents with a chief complaint of   Chief Complaint   Patient presents with   • Med Refill   • Restless Legs Syndrome     3 to 4 months       HPI   Pt here for follow up.  She has been trying to eat well and doing some intermittent fasting.     HTN - well controlled.  No ha/dizziness.     Anxiety/depression - on trintellix.  She takes xanax PRN.  Doing well.     She has been having leg jerks 3-4 times per month.  sht goes to bed to relax and all of a sudden she feels like she just has to move her legs.     The following portions of the patient's history were reviewed and updated as appropriate: allergies, current medications, past family history, past medical history, past social history, past surgical history and problem list.    Allergies: Paroxetine; Cefuroxime axetil; Cephalosporins; Ciprofloxacin; Metronidazole; and Paroxetine hcl    Review of Systems   Constitutional: Negative.    HENT: Negative.    Eyes: Negative.    Respiratory: Negative.    Cardiovascular: Negative.    Gastrointestinal: Negative.    Endocrine: Negative.    Genitourinary: Negative.    Musculoskeletal: Negative.    Skin: Negative.    Allergic/Immunologic: Negative.    Neurological: Negative.    Hematological: Negative.    Psychiatric/Behavioral: Negative.    All other systems reviewed and are negative.            Wt Readings from Last 3 Encounters:   08/11/20 77.7 kg (171 lb 3.2 oz)   05/07/20 78.5 kg (173 lb)   02/13/20 82.8 kg (182 lb 8 oz)     Temp Readings from Last 3 Encounters:   08/11/20 97.6 °F (36.4 °C) (Temporal)   05/07/20 98.4 °F (36.9 °C) (Oral)   11/26/19 98.7 °F (37.1 °C) (Oral)     BP Readings from Last 3 Encounters:   08/11/20 110/68   05/07/20 125/92   02/13/20 110/60     Pulse Readings from Last 3 Encounters:   08/11/20 85   05/07/20 59   11/26/19 70     Body mass index is 29.39 kg/m².  @LASTSAO2(3)@    Physical Exam   Constitutional: She is oriented to person, place, and  time. She appears well-developed and well-nourished. No distress.   HENT:   Head: Normocephalic and atraumatic.   Right Ear: External ear normal.   Left Ear: External ear normal.   Nose: Nose normal.   Mouth/Throat: Oropharynx is clear and moist.   Eyes: Pupils are equal, round, and reactive to light. Conjunctivae and EOM are normal.   Neck: Normal range of motion. Neck supple.   Cardiovascular: Normal rate, regular rhythm, normal heart sounds and intact distal pulses.   Pulmonary/Chest: Effort normal and breath sounds normal. No respiratory distress. She has no wheezes.   Musculoskeletal: Normal range of motion.   Normal gait   Neurological: She is alert and oriented to person, place, and time.   Skin: Skin is warm and dry.   Psychiatric: She has a normal mood and affect. Her behavior is normal. Judgment and thought content normal.   Nursing note and vitals reviewed.      Results for orders placed or performed during the hospital encounter of 05/07/20   Urinalysis With Culture If Indicated - Urine, Clean Catch   Result Value Ref Range    Color, UA Yellow Yellow, Straw    Appearance, UA Clear Clear    pH, UA <=5.0 4.5 - 8.0    Specific Gravity, UA 1.024 1.003 - 1.030    Glucose, UA Negative Negative    Ketones, UA Negative Negative    Bilirubin, UA Negative Negative    Blood, UA Negative Negative    Protein, UA Negative Negative    Leuk Esterase, UA Negative Negative    Nitrite, UA Negative Negative    Urobilinogen, UA 0.2 E.U./dL 0.2 - 1.0 E.U./dL   Comprehensive Metabolic Panel   Result Value Ref Range    Glucose 116 (H) 65 - 99 mg/dL    BUN 18 6 - 20 mg/dL    Creatinine 0.88 0.57 - 1.00 mg/dL    Sodium 137 136 - 145 mmol/L    Potassium 3.8 3.5 - 5.2 mmol/L    Chloride 96 (L) 98 - 107 mmol/L    CO2 26.2 22.0 - 29.0 mmol/L    Calcium 9.0 8.6 - 10.5 mg/dL    Total Protein 7.0 6.0 - 8.5 g/dL    Albumin 4.40 3.50 - 5.20 g/dL    ALT (SGPT) 22 1 - 33 U/L    AST (SGOT) 42 (H) 1 - 32 U/L    Alkaline Phosphatase 83 39 -  117 U/L    Total Bilirubin 0.5 0.2 - 1.2 mg/dL    eGFR Non African Amer 66 >60 mL/min/1.73    Globulin 2.6 gm/dL    A/G Ratio 1.7 g/dL    BUN/Creatinine Ratio 20.5 7.0 - 25.0    Anion Gap 14.8 5.0 - 15.0 mmol/L   Lipase   Result Value Ref Range    Lipase 39 13 - 60 U/L   Troponin   Result Value Ref Range    Troponin T <0.010 0.000 - 0.030 ng/mL   CBC Auto Differential   Result Value Ref Range    WBC 5.96 3.40 - 10.80 10*3/mm3    RBC 4.71 3.77 - 5.28 10*6/mm3    Hemoglobin 13.7 12.0 - 15.9 g/dL    Hematocrit 41.8 34.0 - 46.6 %    MCV 88.7 79.0 - 97.0 fL    MCH 29.1 26.6 - 33.0 pg    MCHC 32.8 31.5 - 35.7 g/dL    RDW 12.8 12.3 - 15.4 %    RDW-SD 41.9 37.0 - 54.0 fl    MPV 9.8 6.0 - 12.0 fL    Platelets 243 140 - 450 10*3/mm3    Neutrophil % 61.7 42.7 - 76.0 %    Lymphocyte % 29.9 19.6 - 45.3 %    Monocyte % 7.2 5.0 - 12.0 %    Eosinophil % 0.2 (L) 0.3 - 6.2 %    Basophil % 0.7 0.0 - 1.5 %    Immature Grans % 0.3 0.0 - 0.5 %    Neutrophils, Absolute 3.68 1.70 - 7.00 10*3/mm3    Lymphocytes, Absolute 1.78 0.70 - 3.10 10*3/mm3    Monocytes, Absolute 0.43 0.10 - 0.90 10*3/mm3    Eosinophils, Absolute 0.01 0.00 - 0.40 10*3/mm3    Basophils, Absolute 0.04 0.00 - 0.20 10*3/mm3    Immature Grans, Absolute 0.02 0.00 - 0.05 10*3/mm3    nRBC 0.0 0.0 - 0.2 /100 WBC           Neville was seen today for med refill and restless legs syndrome.    Diagnoses and all orders for this visit:    Restless legs  -     rOPINIRole (Requip) 0.25 MG tablet; Take 1 tablet by mouth At Night As Needed (restless legs). Take 1 hour before bedtime.    Anxiety  -     ALPRAZolam XR (XANAX XR) 0.5 MG 24 hr tablet; Take 1 tablet by mouth Daily As Needed for Anxiety.    Essential hypertension  -     losartan-hydrochlorothiazide (HYZAAR) 100-12.5 MG per tablet; Take 1 tablet by mouth Daily.    Gastroesophageal reflux disease, esophagitis presence not specified  -     omeprazole (priLOSEC) 20 MG capsule; Take 1 capsule by mouth Daily.          Outpatient  Medications Prior to Visit   Medication Sig Dispense Refill   • albuterol (PROVENTIL HFA;VENTOLIN HFA) 108 (90 BASE) MCG/ACT inhaler Ventolin  (90 Base) MCG/ACT Inhalation Aerosol Solution; Patient Sig: Ventolin  (90 Base) MCG/ACT Inhalation Aerosol Solution INHALE 2-4 PUFFS Q 4 HOURS PRN/COUGH/WHEEZE; 1; 0; 21-Aug-2015; Active     • azelastine (ASTEPRO) 0.15 % solution nasal spray 2 sprays into each nostril 2 (Two) Times a Day. (Patient taking differently: 2 sprays into the nostril(s) as directed by provider 2 (Two) Times a Day As Needed.) 30 mL 1   • Cholecalciferol (VITAMIN D3) 73328 units tablet chew one tablet by mouth every day WITH FOOD  1   • estradiol (ESTRACE) 0.1 MG/GM vaginal cream Insert 1 g into the vagina 2 (Two) Times a Week. 45 g 6   • ondansetron ODT (ZOFRAN-ODT) 4 MG disintegrating tablet Place 1 tablet on the tongue 4 (Four) Times a Day As Needed for Nausea or Vomiting. 20 tablet 0   • TRINTELLIX 10 MG tablet      • ALPRAZolam XR (XANAX XR) 0.5 MG 24 hr tablet Take 1 tablet by mouth Daily As Needed for Anxiety. 30 tablet 0   • losartan-hydrochlorothiazide (HYZAAR) 100-12.5 MG per tablet Take 1 tablet by mouth Daily. 90 tablet 3   • omeprazole (priLOSEC) 20 MG capsule Take 1 capsule by mouth Daily. 90 capsule 3     No facility-administered medications prior to visit.      New Medications Ordered This Visit   Medications   • ALPRAZolam XR (XANAX XR) 0.5 MG 24 hr tablet     Sig: Take 1 tablet by mouth Daily As Needed for Anxiety.     Dispense:  30 tablet     Refill:  2   • losartan-hydrochlorothiazide (HYZAAR) 100-12.5 MG per tablet     Sig: Take 1 tablet by mouth Daily.     Dispense:  90 tablet     Refill:  3   • omeprazole (priLOSEC) 20 MG capsule     Sig: Take 1 capsule by mouth Daily.     Dispense:  90 capsule     Refill:  3   • rOPINIRole (Requip) 0.25 MG tablet     Sig: Take 1 tablet by mouth At Night As Needed (restless legs). Take 1 hour before bedtime.     Dispense:  30 tablet      Refill:  1     [unfilled]  Medications Discontinued During This Encounter   Medication Reason   • ALPRAZolam XR (XANAX XR) 0.5 MG 24 hr tablet Reorder   • losartan-hydrochlorothiazide (HYZAAR) 100-12.5 MG per tablet Reorder   • omeprazole (priLOSEC) 20 MG capsule Reorder         Return in about 6 months (around 2/11/2021) for Recheck, labs.

## 2020-08-14 DIAGNOSIS — K21.9 GASTROESOPHAGEAL REFLUX DISEASE, ESOPHAGITIS PRESENCE NOT SPECIFIED: ICD-10-CM

## 2020-08-14 DIAGNOSIS — I10 ESSENTIAL HYPERTENSION: ICD-10-CM

## 2020-08-14 RX ORDER — LOSARTAN POTASSIUM AND HYDROCHLOROTHIAZIDE 12.5; 1 MG/1; MG/1
1 TABLET ORAL DAILY
Qty: 90 TABLET | Refills: 3 | Status: SHIPPED | OUTPATIENT
Start: 2020-08-14 | End: 2021-01-25

## 2020-08-14 RX ORDER — ESTRADIOL 0.1 MG/G
1 CREAM VAGINAL 2 TIMES WEEKLY
Qty: 45 G | Refills: 6 | Status: SHIPPED | OUTPATIENT
Start: 2020-08-17 | End: 2021-04-23

## 2020-08-14 RX ORDER — OMEPRAZOLE 20 MG/1
20 CAPSULE, DELAYED RELEASE ORAL DAILY
Qty: 90 CAPSULE | Refills: 3 | Status: SHIPPED | OUTPATIENT
Start: 2020-08-14 | End: 2021-06-21

## 2020-11-30 ENCOUNTER — TELEMEDICINE (OUTPATIENT)
Dept: INTERNAL MEDICINE | Facility: CLINIC | Age: 55
End: 2020-11-30

## 2020-11-30 DIAGNOSIS — J32.9 SINUSITIS, UNSPECIFIED CHRONICITY, UNSPECIFIED LOCATION: Primary | ICD-10-CM

## 2020-11-30 DIAGNOSIS — Z00.00 HEALTHCARE MAINTENANCE: ICD-10-CM

## 2020-11-30 DIAGNOSIS — R73.9 HYPERGLYCEMIA: ICD-10-CM

## 2020-11-30 DIAGNOSIS — L65.9 HAIR LOSS: ICD-10-CM

## 2020-11-30 PROCEDURE — 99214 OFFICE O/P EST MOD 30 MIN: CPT | Performed by: INTERNAL MEDICINE

## 2020-11-30 RX ORDER — FLUCONAZOLE 150 MG/1
150 TABLET ORAL ONCE
Qty: 1 TABLET | Refills: 0 | Status: SHIPPED | OUTPATIENT
Start: 2020-11-30 | End: 2020-11-30

## 2020-11-30 RX ORDER — LEVOFLOXACIN 750 MG/1
750 TABLET ORAL DAILY
Qty: 5 TABLET | Refills: 0 | Status: SHIPPED | OUTPATIENT
Start: 2020-11-30 | End: 2021-06-21

## 2020-11-30 NOTE — PROGRESS NOTES
Neville Rodriges is a 55 y.o. female, who presents with a chief complaint of   Chief Complaint   Patient presents with   • Sinusitis       HPI   This visit has been scheduled as a televisit to comply with patient safety concerns in accordance with CDC recommendations. The patient had issues with technology so the visit was changed from televisit to phone visit.      You have chosen to receive care through a telephone visit. Do you consent to use a telephone visit for your medical care today? Yes    Pt has had a sinus infection for about 8 weeks.  Worse on right side of face and right cheek.  She has had lots of drainage and now ears stopped up.  No fever. She is allergic to cephalosporins and says that she has had problems with persistent sx in the past with amoxicillin and zithromax.     Pt has had issues with hair falling out.  She is taking b complex and biotin but no help.     The following portions of the patient's history were reviewed and updated as appropriate: allergies, current medications, past family history, past medical history, past social history, past surgical history and problem list.    Allergies: Paroxetine, Cefuroxime axetil, Cephalosporins, Ciprofloxacin, Metronidazole, and Paroxetine hcl    Review of Systems   Constitutional: Negative.  Negative for fever.   HENT: Positive for congestion, postnasal drip and sinus pressure.    Eyes: Negative.    Respiratory: Negative.    Cardiovascular: Negative.    Gastrointestinal: Negative.    Endocrine: Negative.    Genitourinary: Negative.    Musculoskeletal: Negative.    Skin: Negative.    Allergic/Immunologic: Negative.    Neurological: Negative.    Hematological: Negative.    Psychiatric/Behavioral: Negative.    All other systems reviewed and are negative.            Wt Readings from Last 3 Encounters:   08/11/20 77.7 kg (171 lb 3.2 oz)   05/07/20 78.5 kg (173 lb)   02/13/20 82.8 kg (182 lb 8 oz)     Temp Readings from Last 3 Encounters:    08/11/20 97.6 °F (36.4 °C) (Temporal)   05/07/20 98.4 °F (36.9 °C) (Oral)   11/26/19 98.7 °F (37.1 °C) (Oral)     BP Readings from Last 3 Encounters:   08/11/20 110/68   05/07/20 125/92   02/13/20 110/60     Pulse Readings from Last 3 Encounters:   08/11/20 85   05/07/20 59   11/26/19 70     There is no height or weight on file to calculate BMI.  @LASTSAO2(3)@    Physical Exam  Constitutional:       General: She is not in acute distress.  Pulmonary:      Effort: No respiratory distress.   Neurological:      Mental Status: She is alert and oriented to person, place, and time.   Psychiatric:         Behavior: Behavior normal.         Thought Content: Thought content normal.         Judgment: Judgment normal.         Results for orders placed or performed during the hospital encounter of 05/07/20   Urinalysis With Culture If Indicated - Urine, Clean Catch    Specimen: Urine, Clean Catch   Result Value Ref Range    Color, UA Yellow Yellow, Straw    Appearance, UA Clear Clear    pH, UA <=5.0 4.5 - 8.0    Specific Gravity, UA 1.024 1.003 - 1.030    Glucose, UA Negative Negative    Ketones, UA Negative Negative    Bilirubin, UA Negative Negative    Blood, UA Negative Negative    Protein, UA Negative Negative    Leuk Esterase, UA Negative Negative    Nitrite, UA Negative Negative    Urobilinogen, UA 0.2 E.U./dL 0.2 - 1.0 E.U./dL   Comprehensive Metabolic Panel    Specimen: Blood   Result Value Ref Range    Glucose 116 (H) 65 - 99 mg/dL    BUN 18 6 - 20 mg/dL    Creatinine 0.88 0.57 - 1.00 mg/dL    Sodium 137 136 - 145 mmol/L    Potassium 3.8 3.5 - 5.2 mmol/L    Chloride 96 (L) 98 - 107 mmol/L    CO2 26.2 22.0 - 29.0 mmol/L    Calcium 9.0 8.6 - 10.5 mg/dL    Total Protein 7.0 6.0 - 8.5 g/dL    Albumin 4.40 3.50 - 5.20 g/dL    ALT (SGPT) 22 1 - 33 U/L    AST (SGOT) 42 (H) 1 - 32 U/L    Alkaline Phosphatase 83 39 - 117 U/L    Total Bilirubin 0.5 0.2 - 1.2 mg/dL    eGFR Non African Amer 66 >60 mL/min/1.73    Globulin 2.6  gm/dL    A/G Ratio 1.7 g/dL    BUN/Creatinine Ratio 20.5 7.0 - 25.0    Anion Gap 14.8 5.0 - 15.0 mmol/L   Lipase    Specimen: Blood   Result Value Ref Range    Lipase 39 13 - 60 U/L   Troponin    Specimen: Blood   Result Value Ref Range    Troponin T <0.010 0.000 - 0.030 ng/mL   CBC Auto Differential    Specimen: Blood   Result Value Ref Range    WBC 5.96 3.40 - 10.80 10*3/mm3    RBC 4.71 3.77 - 5.28 10*6/mm3    Hemoglobin 13.7 12.0 - 15.9 g/dL    Hematocrit 41.8 34.0 - 46.6 %    MCV 88.7 79.0 - 97.0 fL    MCH 29.1 26.6 - 33.0 pg    MCHC 32.8 31.5 - 35.7 g/dL    RDW 12.8 12.3 - 15.4 %    RDW-SD 41.9 37.0 - 54.0 fl    MPV 9.8 6.0 - 12.0 fL    Platelets 243 140 - 450 10*3/mm3    Neutrophil % 61.7 42.7 - 76.0 %    Lymphocyte % 29.9 19.6 - 45.3 %    Monocyte % 7.2 5.0 - 12.0 %    Eosinophil % 0.2 (L) 0.3 - 6.2 %    Basophil % 0.7 0.0 - 1.5 %    Immature Grans % 0.3 0.0 - 0.5 %    Neutrophils, Absolute 3.68 1.70 - 7.00 10*3/mm3    Lymphocytes, Absolute 1.78 0.70 - 3.10 10*3/mm3    Monocytes, Absolute 0.43 0.10 - 0.90 10*3/mm3    Eosinophils, Absolute 0.01 0.00 - 0.40 10*3/mm3    Basophils, Absolute 0.04 0.00 - 0.20 10*3/mm3    Immature Grans, Absolute 0.02 0.00 - 0.05 10*3/mm3    nRBC 0.0 0.0 - 0.2 /100 WBC           Diagnoses and all orders for this visit:    1. Sinusitis, unspecified chronicity, unspecified location (Primary)  -     levoFLOXacin (Levaquin) 750 MG tablet; Take 1 tablet by mouth Daily.  Dispense: 5 tablet; Refill: 0    2. Hair loss  -     Comprehensive Metabolic Panel; Future  -     CBC & Differential; Future  -     T4, Free; Future  -     TSH; Future    3. Hyperglycemia  -     Comprehensive Metabolic Panel; Future  -     CBC & Differential; Future  -     T4, Free; Future  -     TSH; Future  -     Lipid Panel With LDL / HDL Ratio; Future  -     Hemoglobin A1c; Future    4. Healthcare maintenance  -     Comprehensive Metabolic Panel; Future  -     CBC & Differential; Future  -     T4, Free; Future  -      TSH; Future  -     Lipid Panel With LDL / HDL Ratio; Future  -     Vitamin B12; Future  -     Vitamin D 25 Hydroxy; Future  -     Hemoglobin A1c; Future    Other orders  -     fluconazole (Diflucan) 150 MG tablet; Take 1 tablet by mouth 1 (One) Time for 1 dose.  Dispense: 1 tablet; Refill: 0      25 minutes was spent in patient care with >50% in counseling about the above issues including medications and disease management plan.         Outpatient Medications Prior to Visit   Medication Sig Dispense Refill   • albuterol (PROVENTIL HFA;VENTOLIN HFA) 108 (90 BASE) MCG/ACT inhaler Ventolin  (90 Base) MCG/ACT Inhalation Aerosol Solution; Patient Sig: Ventolin  (90 Base) MCG/ACT Inhalation Aerosol Solution INHALE 2-4 PUFFS Q 4 HOURS PRN/COUGH/WHEEZE; 1; 0; 21-Aug-2015; Active     • ALPRAZolam XR (XANAX XR) 0.5 MG 24 hr tablet Take 1 tablet by mouth Daily As Needed for Anxiety. 30 tablet 2   • azelastine (ASTEPRO) 0.15 % solution nasal spray 2 sprays into each nostril 2 (Two) Times a Day. (Patient taking differently: 2 sprays into the nostril(s) as directed by provider 2 (Two) Times a Day As Needed.) 30 mL 1   • Cholecalciferol (VITAMIN D3) 49999 units tablet chew one tablet by mouth every day WITH FOOD  1   • estradiol (ESTRACE) 0.1 MG/GM vaginal cream Insert 1 g into the vagina 2 (Two) Times a Week. 45 g 6   • losartan-hydrochlorothiazide (HYZAAR) 100-12.5 MG per tablet Take 1 tablet by mouth Daily. 90 tablet 3   • omeprazole (priLOSEC) 20 MG capsule Take 1 capsule by mouth Daily. 90 capsule 3   • ondansetron ODT (ZOFRAN-ODT) 4 MG disintegrating tablet Place 1 tablet on the tongue 4 (Four) Times a Day As Needed for Nausea or Vomiting. 20 tablet 0   • rOPINIRole (Requip) 0.25 MG tablet Take 1 tablet by mouth At Night As Needed (restless legs). Take 1 hour before bedtime. 30 tablet 1   • TRINTELLIX 10 MG tablet        No facility-administered medications prior to visit.      New Medications Ordered This  Visit   Medications   • levoFLOXacin (Levaquin) 750 MG tablet     Sig: Take 1 tablet by mouth Daily.     Dispense:  5 tablet     Refill:  0   • fluconazole (Diflucan) 150 MG tablet     Sig: Take 1 tablet by mouth 1 (One) Time for 1 dose.     Dispense:  1 tablet     Refill:  0     [unfilled]  There are no discontinued medications.      Return for Annual physical, labs.

## 2020-12-01 ENCOUNTER — TELEPHONE (OUTPATIENT)
Dept: INTERNAL MEDICINE | Facility: CLINIC | Age: 55
End: 2020-12-01

## 2020-12-01 NOTE — TELEPHONE ENCOUNTER
Communication sent via [email or web address] and letter requesting call back for appointment.      ----- Message from Meryl Mesa MD sent at 11/30/2020  2:27 PM EST -----  Pt needs appt for labs and physical please.  Lab orders in

## 2020-12-05 ENCOUNTER — RESULTS ENCOUNTER (OUTPATIENT)
Dept: INTERNAL MEDICINE | Facility: CLINIC | Age: 55
End: 2020-12-05

## 2020-12-05 DIAGNOSIS — R73.9 HYPERGLYCEMIA: ICD-10-CM

## 2020-12-05 DIAGNOSIS — Z00.00 HEALTHCARE MAINTENANCE: ICD-10-CM

## 2020-12-05 DIAGNOSIS — L65.9 HAIR LOSS: ICD-10-CM

## 2021-01-23 DIAGNOSIS — I10 ESSENTIAL HYPERTENSION: ICD-10-CM

## 2021-01-25 RX ORDER — LOSARTAN POTASSIUM AND HYDROCHLOROTHIAZIDE 12.5; 1 MG/1; MG/1
TABLET ORAL
Qty: 90 TABLET | Refills: 3 | Status: SHIPPED | OUTPATIENT
Start: 2021-01-25 | End: 2022-01-18

## 2021-04-23 RX ORDER — ESTRADIOL 0.1 MG/G
CREAM VAGINAL
Qty: 42.5 G | Refills: 2 | Status: SHIPPED | OUTPATIENT
Start: 2021-04-23 | End: 2022-01-20

## 2021-06-16 LAB
25(OH)D3+25(OH)D2 SERPL-MCNC: 29.6 NG/ML (ref 30–100)
ALBUMIN SERPL-MCNC: 4.6 G/DL (ref 3.5–5.2)
ALBUMIN/GLOB SERPL: 2.4 G/DL
ALP SERPL-CCNC: 77 U/L (ref 39–117)
ALT SERPL-CCNC: 16 U/L (ref 1–33)
AST SERPL-CCNC: 16 U/L (ref 1–32)
BASOPHILS # BLD AUTO: 0.05 10*3/MM3 (ref 0–0.2)
BASOPHILS NFR BLD AUTO: 0.9 % (ref 0–1.5)
BILIRUB SERPL-MCNC: 0.5 MG/DL (ref 0–1.2)
BUN SERPL-MCNC: 15 MG/DL (ref 6–20)
BUN/CREAT SERPL: 18.8 (ref 7–25)
CALCIUM SERPL-MCNC: 9.4 MG/DL (ref 8.6–10.5)
CHLORIDE SERPL-SCNC: 102 MMOL/L (ref 98–107)
CHOLEST SERPL-MCNC: 202 MG/DL (ref 0–200)
CO2 SERPL-SCNC: 27.4 MMOL/L (ref 22–29)
CREAT SERPL-MCNC: 0.8 MG/DL (ref 0.57–1)
EOSINOPHIL # BLD AUTO: 0.1 10*3/MM3 (ref 0–0.4)
EOSINOPHIL NFR BLD AUTO: 1.9 % (ref 0.3–6.2)
ERYTHROCYTE [DISTWIDTH] IN BLOOD BY AUTOMATED COUNT: 13.3 % (ref 12.3–15.4)
GLOBULIN SER CALC-MCNC: 1.9 GM/DL
GLUCOSE SERPL-MCNC: 97 MG/DL (ref 65–99)
HBA1C MFR BLD: 5.7 % (ref 4.8–5.6)
HCT VFR BLD AUTO: 41.5 % (ref 34–46.6)
HDLC SERPL-MCNC: 47 MG/DL (ref 40–60)
HGB BLD-MCNC: 13.5 G/DL (ref 12–15.9)
IMM GRANULOCYTES # BLD AUTO: 0.01 10*3/MM3 (ref 0–0.05)
IMM GRANULOCYTES NFR BLD AUTO: 0.2 % (ref 0–0.5)
LDLC SERPL CALC-MCNC: 124 MG/DL (ref 0–100)
LDLC/HDLC SERPL: 2.56 {RATIO}
LYMPHOCYTES # BLD AUTO: 1.85 10*3/MM3 (ref 0.7–3.1)
LYMPHOCYTES NFR BLD AUTO: 35 % (ref 19.6–45.3)
MCH RBC QN AUTO: 29.1 PG (ref 26.6–33)
MCHC RBC AUTO-ENTMCNC: 32.5 G/DL (ref 31.5–35.7)
MCV RBC AUTO: 89.4 FL (ref 79–97)
MONOCYTES # BLD AUTO: 0.37 10*3/MM3 (ref 0.1–0.9)
MONOCYTES NFR BLD AUTO: 7 % (ref 5–12)
NEUTROPHILS # BLD AUTO: 2.91 10*3/MM3 (ref 1.7–7)
NEUTROPHILS NFR BLD AUTO: 55 % (ref 42.7–76)
NRBC BLD AUTO-RTO: 0 /100 WBC (ref 0–0.2)
PLATELET # BLD AUTO: 280 10*3/MM3 (ref 140–450)
POTASSIUM SERPL-SCNC: 4 MMOL/L (ref 3.5–5.2)
PROT SERPL-MCNC: 6.5 G/DL (ref 6–8.5)
RBC # BLD AUTO: 4.64 10*6/MM3 (ref 3.77–5.28)
SODIUM SERPL-SCNC: 140 MMOL/L (ref 136–145)
T4 FREE SERPL-MCNC: 1.07 NG/DL (ref 0.93–1.7)
TRIGL SERPL-MCNC: 174 MG/DL (ref 0–150)
TSH SERPL DL<=0.005 MIU/L-ACNC: 2.36 UIU/ML (ref 0.27–4.2)
VIT B12 SERPL-MCNC: 740 PG/ML (ref 211–946)
VLDLC SERPL CALC-MCNC: 31 MG/DL (ref 5–40)
WBC # BLD AUTO: 5.29 10*3/MM3 (ref 3.4–10.8)

## 2021-06-21 ENCOUNTER — OFFICE VISIT (OUTPATIENT)
Dept: INTERNAL MEDICINE | Facility: CLINIC | Age: 56
End: 2021-06-21

## 2021-06-21 VITALS
OXYGEN SATURATION: 98 % | HEART RATE: 72 BPM | SYSTOLIC BLOOD PRESSURE: 106 MMHG | BODY MASS INDEX: 30.93 KG/M2 | WEIGHT: 181.2 LBS | HEIGHT: 64 IN | TEMPERATURE: 96.8 F | DIASTOLIC BLOOD PRESSURE: 80 MMHG | RESPIRATION RATE: 18 BRPM

## 2021-06-21 DIAGNOSIS — R73.03 PREDIABETES: ICD-10-CM

## 2021-06-21 DIAGNOSIS — E78.2 MIXED HYPERLIPIDEMIA: ICD-10-CM

## 2021-06-21 DIAGNOSIS — Z12.31 BREAST CANCER SCREENING BY MAMMOGRAM: ICD-10-CM

## 2021-06-21 DIAGNOSIS — F41.9 ANXIETY: ICD-10-CM

## 2021-06-21 DIAGNOSIS — K21.9 GASTROESOPHAGEAL REFLUX DISEASE, UNSPECIFIED WHETHER ESOPHAGITIS PRESENT: ICD-10-CM

## 2021-06-21 DIAGNOSIS — E55.9 VITAMIN D DEFICIENCY: ICD-10-CM

## 2021-06-21 DIAGNOSIS — Z78.0 POST-MENOPAUSAL: ICD-10-CM

## 2021-06-21 DIAGNOSIS — Z00.00 HEALTHCARE MAINTENANCE: Primary | ICD-10-CM

## 2021-06-21 DIAGNOSIS — I10 HTN (HYPERTENSION), BENIGN: ICD-10-CM

## 2021-06-21 PROCEDURE — 99396 PREV VISIT EST AGE 40-64: CPT | Performed by: INTERNAL MEDICINE

## 2021-06-21 PROCEDURE — 99213 OFFICE O/P EST LOW 20 MIN: CPT | Performed by: INTERNAL MEDICINE

## 2021-06-21 RX ORDER — ALPRAZOLAM 0.5 MG/1
0.5 TABLET, EXTENDED RELEASE ORAL DAILY PRN
Qty: 30 TABLET | Refills: 3 | Status: SHIPPED | OUTPATIENT
Start: 2021-06-21 | End: 2023-01-11 | Stop reason: SDUPTHER

## 2021-06-21 NOTE — PROGRESS NOTES
Chief Complaint   Patient presents with   • Annual Exam   • Heartburn   • Anxiety     Patient Name: Neville Rodriges    SUBJECTIVE    Neville is a 55 y.o. female presenting for Annual Exam, Heartburn, and Anxiety      Neville Rodriges 55 y.o. female who presents for an Annual Wellness Visit.  she has a history of   Patient Active Problem List   Diagnosis   • HTN (hypertension), benign   • Gastroesophageal reflux disease   • Sprain of right ankle   • Lichen sclerosus   • Abnormal weight gain   • Depression with anxiety   • Hx of blood clots   • Mixed hyperlipidemia   • DEDRICK on CPAP   • Other atopic dermatitis   • Rosacea   • Lichen planus- mouth   • Vaginal atrophy   • Acute pancreatitis   • Chondromalacia of knee, right   .  she has been doing well with new interval problems.      Health Habits:   Dental Exam. up to date  Eye Exam. not up to date - pt will schedule appt  Exercise: 3 times/week.  Current exercise activities include: walking    Menstrual history: menopausal in early 50's  Last pap date: 10/17/17 - follows with gyn  Abnormal pap - no  Mammogram: due  Dexa:due  Colonoscopy:UTD  Tob use:no   EtOH use:social  Qualifies for lung Ca screening - no    HTN - on losartan-hctz.  No ha/dizziness.  Well controlled.      GERD - on ppi.  She feels like the brand of prilosec works better than the generic.     Anxiety - on trintellix.  Uses xanax prn.     Vit d deficiency - on daily supplement.     Pre diabetes  - a1c up to 5.7.  She knows she needs to work on improving diet and exercising more.     hld - chol up    The following portions of the patient's history were reviewed and updated as appropriate: allergies, current medications, past family history, past medical history, past social history, past surgical history and problem list.    Review of Systems   Constitutional: Negative.    HENT: Negative.    Eyes: Negative.    Respiratory: Negative.    Cardiovascular: Negative.    Gastrointestinal: Negative.   "  Endocrine: Negative.    Genitourinary: Negative.    Musculoskeletal: Negative.    Skin: Negative.    Allergic/Immunologic: Negative.    Neurological: Negative.    Hematological: Negative.    Psychiatric/Behavioral: Negative.    All other systems reviewed and are negative.      Paroxetine, Cefuroxime axetil, Cephalosporins, Ciprofloxacin, Metronidazole, and Paroxetine hcl      Current Outpatient Medications:   •  ALPRAZolam XR (XANAX XR) 0.5 MG 24 hr tablet, Take 1 tablet by mouth Daily As Needed for Anxiety., Disp: 30 tablet, Rfl: 3  •  azelastine (ASTEPRO) 0.15 % solution nasal spray, 2 sprays into each nostril 2 (Two) Times a Day. (Patient taking differently: 2 sprays into the nostril(s) as directed by provider 2 (Two) Times a Day As Needed.), Disp: 30 mL, Rfl: 1  •  Cholecalciferol (Vitamin D3) 1.25 MG (41056 UT) tablet, Take 1 tablet by mouth Daily., Disp: 90 tablet, Rfl: 3  •  estradiol (ESTRACE) 0.1 MG/GM vaginal cream, INSERT 1 GRAM VAGINALLY TWICE A WEEK, Disp: 42.5 g, Rfl: 2  •  losartan-hydrochlorothiazide (HYZAAR) 100-12.5 MG per tablet, TAKE 1 TABLET DAILY, Disp: 90 tablet, Rfl: 3  •  TRINTELLIX 10 MG tablet, , Disp: , Rfl:   •  PrilOSEC 20 MG capsule, Take 1 capsule by mouth Daily., Disp: 90 capsule, Rfl: 3    OBJECTIVE    /80   Pulse 72   Temp 96.8 °F (36 °C) (Temporal)   Resp 18   Ht 162.6 cm (64.02\")   Wt 82.2 kg (181 lb 3.2 oz)   LMP  (LMP Unknown) Comment: partial hysterectomy   SpO2 98%   BMI 31.09 kg/m²     Physical Exam  Vitals and nursing note reviewed.   Constitutional:       General: She is not in acute distress.     Appearance: She is well-developed.   HENT:      Head: Normocephalic and atraumatic.      Right Ear: External ear normal.      Left Ear: External ear normal.      Nose: Nose normal.   Eyes:      Conjunctiva/sclera: Conjunctivae normal.      Pupils: Pupils are equal, round, and reactive to light.   Cardiovascular:      Rate and Rhythm: Normal rate and regular " rhythm.      Heart sounds: Normal heart sounds.   Pulmonary:      Effort: Pulmonary effort is normal. No respiratory distress.      Breath sounds: Normal breath sounds. No wheezing.   Musculoskeletal:         General: Normal range of motion.      Cervical back: Normal range of motion and neck supple.      Comments: Normal gait   Skin:     General: Skin is warm and dry.   Neurological:      Mental Status: She is alert and oriented to person, place, and time.   Psychiatric:         Behavior: Behavior normal.         Thought Content: Thought content normal.         Judgment: Judgment normal.         Common labs    Common Labsle 6/15/21 6/15/21 6/15/21 6/15/21    0857 [id number] 0857 0857   Glucose 97      BUN 15      Creatinine 0.80      eGFR Non  Am 74      eGFR African Am 90      Sodium 140      Potassium 4.0      Chloride 102      Calcium 9.4      Total Protein 6.5      Albumin 4.60      Total Bilirubin 0.5      Alkaline Phosphatase 77      AST (SGOT) 16      ALT (SGPT) 16      WBC  5.29     Hemoglobin  13.5     Hematocrit  41.5     Platelets  280     Total Cholesterol   202 (A)    Triglycerides   174 (A)    HDL Cholesterol   47    LDL Cholesterol    124 (A)    Hemoglobin A1C    5.70 (A)   (A) Abnormal value       Comments are available for some flowsheets but are not being displayed.             ASSESSMENT AND PLAN  Discussed healthy diet, exercise, cancer screening, immunizations, and preventive care.  Hm tab updated.  Encouraged seat belt use.  No texting while driving. Orders placed as below.     Encouraged covid vaccination if not already done.  Covid vaccination can be scheduled at [email or web address]    begin progressive daily aerobic exercise program and follow a low fat, low cholesterol diet    Diagnoses and all orders for this visit:    1. Healthcare maintenance (Primary) - Due for pap and pt follows with gynecology  -     Mammo Screening Bilateral With CAD; Future  -     DEXA Bone  Density Axial; Future    2. Post-menopausal  -     DEXA Bone Density Axial; Future    3. Breast cancer screening by mammogram  -     Mammo Screening Bilateral With CAD; Future    4. Gastroesophageal reflux disease, unspecified whether esophagitis present  -     PrilOSEC 20 MG capsule; Take 1 capsule by mouth Daily.  Dispense: 90 capsule; Refill: 3    5. Anxiety  -     ALPRAZolam XR (XANAX XR) 0.5 MG 24 hr tablet; Take 1 tablet by mouth Daily As Needed for Anxiety.  Dispense: 30 tablet; Refill: 3    6. Vitamin D deficiency  -     Cholecalciferol (Vitamin D3) 1.25 MG (40577 UT) tablet; Take 1 tablet by mouth Daily.  Dispense: 90 tablet; Refill: 3    7. Mixed hyperlipidemia - work on diet/exercise.  No statin indication at this time  The 10-year ASCVD risk score (Dayami WOOD Jr., et al., 2013) is: 2.6%    Values used to calculate the score:      Age: 55 years      Sex: Female      Is Non- : No      Diabetic: No      Tobacco smoker: No      Systolic Blood Pressure: 106 mmHg      Is BP treated: Yes      HDL Cholesterol: 47 mg/dL      Total Cholesterol: 202 mg/dL      8. Prediabetes - encouraged healthy diet/exercise.    9. HTN - well controlled        Reviewed current medication plan with patient today.  Continue current medications.  Encouraged healthy diet/exercise.  OV labs in  6  months.  Orders placed for labs in [email or web address].  Pt to call sooner if any other issues arise.      Encouraged covid vaccination if not already done.  Covid vaccination can be scheduled at [email or web address]    [unfilled]    Return in about 6 months (around 12/21/2021) for Recheck, labs.

## 2021-06-21 NOTE — PATIENT INSTRUCTIONS
Health Maintenance for Postmenopausal Women  Menopause is a normal process in which your ability to get pregnant comes to an end. This process happens slowly over many months or years, usually between the ages of 48 and 55. Menopause is complete when you have missed your menstrual periods for 12 months.  It is important to talk with your health care provider about some of the most common conditions that affect women after menopause (postmenopausal women). These include heart disease, cancer, and bone loss (osteoporosis). Adopting a healthy lifestyle and getting preventive care can help to promote your health and wellness. The actions you take can also lower your chances of developing some of these common conditions.  What should I know about menopause?  During menopause, you may get a number of symptoms, such as:  · Hot flashes. These can be moderate or severe.  · Night sweats.  · Decrease in sex drive.  · Mood swings.  · Headaches.  · Tiredness.  · Irritability.  · Memory problems.  · Insomnia.  Choosing to treat or not to treat these symptoms is a decision that you make with your health care provider.  Do I need hormone replacement therapy?  · Hormone replacement therapy is effective in treating symptoms that are caused by menopause, such as hot flashes and night sweats.  · Hormone replacement carries certain risks, especially as you become older. If you are thinking about using estrogen or estrogen with progestin, discuss the benefits and risks with your health care provider.  What is my risk for heart disease and stroke?  The risk of heart disease, heart attack, and stroke increases as you age. One of the causes may be a change in the body's hormones during menopause. This can affect how your body uses dietary fats, triglycerides, and cholesterol. Heart attack and stroke are medical emergencies. There are many things that you can do to help prevent heart disease and stroke.  Watch your blood pressure  · High  blood pressure causes heart disease and increases the risk of stroke. This is more likely to develop in people who have high blood pressure readings, are of  descent, or are overweight.  · Have your blood pressure checked:  ? Every 3-5 years if you are 18-39 years of age.  ? Every year if you are 40 years old or older.  Eat a healthy diet    · Eat a diet that includes plenty of vegetables, fruits, low-fat dairy products, and lean protein.  · Do not eat a lot of foods that are high in solid fats, added sugars, or sodium.  Get regular exercise  Get regular exercise. This is one of the most important things you can do for your health. Most adults should:  · Try to exercise for at least 150 minutes each week. The exercise should increase your heart rate and make you sweat (moderate-intensity exercise).  · Try to do strengthening exercises at least twice each week. Do these in addition to the moderate-intensity exercise.  · Spend less time sitting. Even light physical activity can be beneficial.  Other tips  · Work with your health care provider to achieve or maintain a healthy weight.  · Do not use any products that contain nicotine or tobacco, such as cigarettes, e-cigarettes, and chewing tobacco. If you need help quitting, ask your health care provider.  · Know your numbers. Ask your health care provider to check your cholesterol and your blood sugar (glucose). Continue to have your blood tested as directed by your health care provider.  Do I need screening for cancer?  Depending on your health history and family history, you may need to have cancer screening at different stages of your life. This may include screening for:  · Breast cancer.  · Cervical cancer.  · Lung cancer.  · Colorectal cancer.  What is my risk for osteoporosis?  After menopause, you may be at increased risk for osteoporosis. Osteoporosis is a condition in which bone destruction happens more quickly than new bone creation. To help prevent  osteoporosis or the bone fractures that can happen because of osteoporosis, you may take the following actions:  · If you are 19-50 years old, get at least 1,000 mg of calcium and at least 600 mg of vitamin D per day.  · If you are older than age 50 but younger than age 70, get at least 1,200 mg of calcium and at least 600 mg of vitamin D per day.  · If you are older than age 70, get at least 1,200 mg of calcium and at least 800 mg of vitamin D per day.  Smoking and drinking excessive alcohol increase the risk of osteoporosis. Eat foods that are rich in calcium and vitamin D, and do weight-bearing exercises several times each week as directed by your health care provider.  How does menopause affect my mental health?  Depression may occur at any age, but it is more common as you become older. Common symptoms of depression include:  · Low or sad mood.  · Changes in sleep patterns.  · Changes in appetite or eating patterns.  · Feeling an overall lack of motivation or enjoyment of activities that you previously enjoyed.  · Frequent crying spells.  Talk with your health care provider if you think that you are experiencing depression.  General instructions  See your health care provider for regular wellness exams and vaccines. This may include:  · Scheduling regular health, dental, and eye exams.  · Getting and maintaining your vaccines. These include:  ? Influenza vaccine. Get this vaccine each year before the flu season begins.  ? Pneumonia vaccine.  ? Shingles vaccine.  ? Tetanus, diphtheria, and pertussis (Tdap) booster vaccine.  Your health care provider may also recommend other immunizations.  Tell your health care provider if you have ever been abused or do not feel safe at home.  Summary  · Menopause is a normal process in which your ability to get pregnant comes to an end.  · This condition causes hot flashes, night sweats, decreased interest in sex, mood swings, headaches, or lack of sleep.  · Treatment for this  condition may include hormone replacement therapy.  · Take actions to keep yourself healthy, including exercising regularly, eating a healthy diet, watching your weight, and checking your blood pressure and blood sugar levels.  · Get screened for cancer and depression. Make sure that you are up to date with all your vaccines.  This information is not intended to replace advice given to you by your health care provider. Make sure you discuss any questions you have with your health care provider.  Document Revised: 12/11/2019 Document Reviewed: 12/11/2019  [employer] Patient Education © 2021 [employer]

## 2021-06-24 ENCOUNTER — TRANSCRIBE ORDERS (OUTPATIENT)
Dept: ADMINISTRATIVE | Facility: HOSPITAL | Age: 56
End: 2021-06-24

## 2021-06-24 DIAGNOSIS — Z12.31 ENCOUNTER FOR SCREENING MAMMOGRAM FOR BREAST CANCER: Primary | ICD-10-CM

## 2021-07-15 DIAGNOSIS — E55.9 VITAMIN D DEFICIENCY: ICD-10-CM

## 2021-07-24 DIAGNOSIS — K21.9 GASTROESOPHAGEAL REFLUX DISEASE, UNSPECIFIED WHETHER ESOPHAGITIS PRESENT: ICD-10-CM

## 2021-07-26 RX ORDER — OMEPRAZOLE 20 MG/1
CAPSULE, DELAYED RELEASE ORAL
Qty: 90 CAPSULE | Refills: 3 | OUTPATIENT
Start: 2021-07-26

## 2021-07-27 ENCOUNTER — TELEPHONE (OUTPATIENT)
Dept: INTERNAL MEDICINE | Facility: CLINIC | Age: 56
End: 2021-07-27

## 2021-07-27 ENCOUNTER — OFFICE VISIT (OUTPATIENT)
Dept: INTERNAL MEDICINE | Facility: CLINIC | Age: 56
End: 2021-07-27

## 2021-07-27 ENCOUNTER — HOSPITAL ENCOUNTER (OUTPATIENT)
Dept: CT IMAGING | Facility: HOSPITAL | Age: 56
Discharge: HOME | End: 2021-07-27

## 2021-07-27 ENCOUNTER — LAB (OUTPATIENT)
Dept: LAB | Facility: HOSPITAL | Age: 56
End: 2021-07-27

## 2021-07-27 VITALS
HEIGHT: 64 IN | HEART RATE: 94 BPM | SYSTOLIC BLOOD PRESSURE: 130 MMHG | OXYGEN SATURATION: 96 % | WEIGHT: 180 LBS | DIASTOLIC BLOOD PRESSURE: 80 MMHG | RESPIRATION RATE: 18 BRPM | BODY MASS INDEX: 30.73 KG/M2 | TEMPERATURE: 96.9 F

## 2021-07-27 DIAGNOSIS — K85.90 RECURRENT PANCREATITIS: ICD-10-CM

## 2021-07-27 DIAGNOSIS — R10.13 EPIGASTRIC PAIN: ICD-10-CM

## 2021-07-27 DIAGNOSIS — R10.11 RIGHT UPPER QUADRANT PAIN: ICD-10-CM

## 2021-07-27 DIAGNOSIS — R10.13 EPIGASTRIC PAIN: Primary | ICD-10-CM

## 2021-07-27 LAB
ALBUMIN SERPL-MCNC: 4.4 G/DL (ref 3.5–5.2)
ALBUMIN/GLOB SERPL: 1.7 G/DL
ALP SERPL-CCNC: 75 U/L (ref 39–117)
ALT SERPL W P-5'-P-CCNC: 16 U/L (ref 1–33)
AMYLASE SERPL-CCNC: 83 U/L (ref 28–100)
ANION GAP SERPL CALCULATED.3IONS-SCNC: 6.8 MMOL/L (ref 5–15)
AST SERPL-CCNC: 17 U/L (ref 1–32)
BASOPHILS # BLD AUTO: 0.05 10*3/MM3 (ref 0–0.2)
BASOPHILS NFR BLD AUTO: 0.8 % (ref 0–1.5)
BILIRUB SERPL-MCNC: 0.4 MG/DL (ref 0–1.2)
BUN SERPL-MCNC: 14 MG/DL (ref 6–20)
BUN/CREAT SERPL: 17.3 (ref 7–25)
CALCIUM SPEC-SCNC: 9.2 MG/DL (ref 8.6–10.5)
CHLORIDE SERPL-SCNC: 100 MMOL/L (ref 98–107)
CHOLEST SERPL-MCNC: 197 MG/DL (ref 0–200)
CO2 SERPL-SCNC: 31.2 MMOL/L (ref 22–29)
CREAT SERPL-MCNC: 0.81 MG/DL (ref 0.57–1)
DEPRECATED RDW RBC AUTO: 43.2 FL (ref 37–54)
EOSINOPHIL # BLD AUTO: 0.07 10*3/MM3 (ref 0–0.4)
EOSINOPHIL NFR BLD AUTO: 1.1 % (ref 0.3–6.2)
ERYTHROCYTE [DISTWIDTH] IN BLOOD BY AUTOMATED COUNT: 13.2 % (ref 12.3–15.4)
GFR SERPL CREATININE-BSD FRML MDRD: 73 ML/MIN/1.73
GLOBULIN UR ELPH-MCNC: 2.6 GM/DL
GLUCOSE SERPL-MCNC: 90 MG/DL (ref 65–99)
HCT VFR BLD AUTO: 42.8 % (ref 34–46.6)
HDLC SERPL-MCNC: 52 MG/DL (ref 40–60)
HGB BLD-MCNC: 13.8 G/DL (ref 12–15.9)
IMM GRANULOCYTES # BLD AUTO: 0.02 10*3/MM3 (ref 0–0.05)
IMM GRANULOCYTES NFR BLD AUTO: 0.3 % (ref 0–0.5)
LDLC SERPL CALC-MCNC: 115 MG/DL (ref 0–100)
LDLC/HDLC SERPL: 2.12 {RATIO}
LIPASE SERPL-CCNC: 37 U/L (ref 13–60)
LYMPHOCYTES # BLD AUTO: 1.98 10*3/MM3 (ref 0.7–3.1)
LYMPHOCYTES NFR BLD AUTO: 32.2 % (ref 19.6–45.3)
MCH RBC QN AUTO: 29.1 PG (ref 26.6–33)
MCHC RBC AUTO-ENTMCNC: 32.2 G/DL (ref 31.5–35.7)
MCV RBC AUTO: 90.1 FL (ref 79–97)
MONOCYTES # BLD AUTO: 0.59 10*3/MM3 (ref 0.1–0.9)
MONOCYTES NFR BLD AUTO: 9.6 % (ref 5–12)
NEUTROPHILS NFR BLD AUTO: 3.43 10*3/MM3 (ref 1.7–7)
NEUTROPHILS NFR BLD AUTO: 56 % (ref 42.7–76)
NRBC BLD AUTO-RTO: 0 /100 WBC (ref 0–0.2)
PLATELET # BLD AUTO: 258 10*3/MM3 (ref 140–450)
PMV BLD AUTO: 9.4 FL (ref 6–12)
POTASSIUM SERPL-SCNC: 3.7 MMOL/L (ref 3.5–5.2)
PROT SERPL-MCNC: 7 G/DL (ref 6–8.5)
RBC # BLD AUTO: 4.75 10*6/MM3 (ref 3.77–5.28)
SODIUM SERPL-SCNC: 138 MMOL/L (ref 136–145)
TRIGL SERPL-MCNC: 174 MG/DL (ref 0–150)
VLDLC SERPL-MCNC: 30 MG/DL (ref 5–40)
WBC # BLD AUTO: 6.14 10*3/MM3 (ref 3.4–10.8)

## 2021-07-27 PROCEDURE — 99214 OFFICE O/P EST MOD 30 MIN: CPT | Performed by: INTERNAL MEDICINE

## 2021-07-27 PROCEDURE — 85025 COMPLETE CBC W/AUTO DIFF WBC: CPT

## 2021-07-27 PROCEDURE — 80053 COMPREHEN METABOLIC PANEL: CPT

## 2021-07-27 PROCEDURE — 80061 LIPID PANEL: CPT

## 2021-07-27 PROCEDURE — 36415 COLL VENOUS BLD VENIPUNCTURE: CPT

## 2021-07-27 PROCEDURE — 0 DIATRIZOATE MEGLUMINE & SODIUM PER 1 ML: Performed by: INTERNAL MEDICINE

## 2021-07-27 PROCEDURE — 82150 ASSAY OF AMYLASE: CPT

## 2021-07-27 PROCEDURE — 83690 ASSAY OF LIPASE: CPT

## 2021-07-27 PROCEDURE — 74177 CT ABD & PELVIS W/CONTRAST: CPT

## 2021-07-27 PROCEDURE — 0 IOPAMIDOL PER 1 ML: Performed by: INTERNAL MEDICINE

## 2021-07-27 RX ADMIN — IOPAMIDOL 100 ML: 755 INJECTION, SOLUTION INTRAVENOUS at 18:55

## 2021-07-27 RX ADMIN — DIATRIZOATE MEGLUMINE AND DIATRIZOATE SODIUM 30 ML: 600; 100 SOLUTION ORAL; RECTAL at 17:25

## 2021-07-27 NOTE — PROGRESS NOTES
Neville Rodriges is a 55 y.o. female, who presents with a chief complaint of   Chief Complaint   Patient presents with   • Abdominal Pain     x 5 days            HPI   Pt here bc of abd pain.  She is worried about having another episode of pancreatitis. She has mid epigastric pain that goes to the right side and around to her back.  Pain slightly worse with eating.  She does not have a gallbladder.  Her last hospitalization for pancreatitis was august 2018. She isnt aware of any specific things that would trigger an episode.  She was recently in [place].  She did eat lots of fish on her trip.  She didn't eat anything fatty that she can recall.  Se has been eating about 1 time a day the past few days. She has had mucus in bowel movements.  Minimal nausea      The following portions of the patient's history were reviewed and updated as appropriate: allergies, current medications, past family history, past medical history, past social history, past surgical history and problem list.    Allergies: Paroxetine, Cefuroxime axetil, Cephalosporins, Ciprofloxacin, Metronidazole, and Paroxetine hcl    Review of Systems   Constitutional: Negative.    HENT: Negative.    Eyes: Negative.    Respiratory: Negative.    Cardiovascular: Negative.    Gastrointestinal: Positive for abdominal pain and diarrhea.   Endocrine: Negative.    Genitourinary: Negative.    Musculoskeletal: Negative.    Skin: Negative.    Allergic/Immunologic: Negative.    Neurological: Negative.    Hematological: Negative.    Psychiatric/Behavioral: Negative.    All other systems reviewed and are negative.            Wt Readings from Last 3 Encounters:   07/27/21 81.6 kg (180 lb)   06/21/21 82.2 kg (181 lb 3.2 oz)   08/11/20 77.7 kg (171 lb 3.2 oz)     Temp Readings from Last 3 Encounters:   07/27/21 96.9 °F (36.1 °C) (Temporal)   06/21/21 96.8 °F (36 °C) (Temporal)   08/11/20 97.6 °F (36.4 °C) (Temporal)     BP Readings from Last 3 Encounters:    07/27/21 130/80   06/21/21 106/80   08/11/20 110/68     Pulse Readings from Last 3 Encounters:   07/27/21 94   06/21/21 72   08/11/20 85     Body mass index is 30.88 kg/m².  SpO2 Readings from Last 3 Encounters:   07/27/21 96%   06/21/21 98%   08/11/20 98%          Physical Exam  Vitals and nursing note reviewed.   Constitutional:       General: She is not in acute distress.     Appearance: She is well-developed.   HENT:      Head: Normocephalic and atraumatic.      Right Ear: External ear normal.      Left Ear: External ear normal.      Nose: Nose normal.   Eyes:      Conjunctiva/sclera: Conjunctivae normal.      Pupils: Pupils are equal, round, and reactive to light.   Cardiovascular:      Rate and Rhythm: Normal rate and regular rhythm.      Heart sounds: Normal heart sounds.   Pulmonary:      Effort: Pulmonary effort is normal. No respiratory distress.      Breath sounds: Normal breath sounds. No wheezing.   Abdominal:      General: There is no distension.      Tenderness: There is abdominal tenderness. There is guarding. There is no rebound.      Comments: ruq and epigastric ttp   Musculoskeletal:         General: Normal range of motion.      Cervical back: Normal range of motion and neck supple.      Comments: Normal gait   Skin:     General: Skin is warm and dry.   Neurological:      Mental Status: She is alert and oriented to person, place, and time.   Psychiatric:         Behavior: Behavior normal.         Thought Content: Thought content normal.         Judgment: Judgment normal.         Results for orders placed or performed in visit on 12/05/20   Comprehensive Metabolic Panel    Specimen: Blood   Result Value Ref Range    Glucose 97 65 - 99 mg/dL    BUN 15 6 - 20 mg/dL    Creatinine 0.80 0.57 - 1.00 mg/dL    eGFR Non African Am 74 >60 mL/min/1.73    eGFR African Am 90 >60 mL/min/1.73    BUN/Creatinine Ratio 18.8 7.0 - 25.0    Sodium 140 136 - 145 mmol/L    Potassium 4.0 3.5 - 5.2 mmol/L    Chloride 102  98 - 107 mmol/L    Total CO2 27.4 22.0 - 29.0 mmol/L    Calcium 9.4 8.6 - 10.5 mg/dL    Total Protein 6.5 6.0 - 8.5 g/dL    Albumin 4.60 3.50 - 5.20 g/dL    Globulin 1.9 gm/dL    A/G Ratio 2.4 g/dL    Total Bilirubin 0.5 0.0 - 1.2 mg/dL    Alkaline Phosphatase 77 39 - 117 U/L    AST (SGOT) 16 1 - 32 U/L    ALT (SGPT) 16 1 - 33 U/L   T4, Free    Specimen: Blood   Result Value Ref Range    Free T4 1.07 0.93 - 1.70 ng/dL   TSH    Specimen: Blood   Result Value Ref Range    TSH 2.360 0.270 - 4.200 uIU/mL   Lipid Panel With LDL / HDL Ratio    Specimen: Blood   Result Value Ref Range    Total Cholesterol 202 (H) 0 - 200 mg/dL    Triglycerides 174 (H) 0 - 150 mg/dL    HDL Cholesterol 47 40 - 60 mg/dL    VLDL Cholesterol Surya 31 5 - 40 mg/dL    LDL Chol Calc (NIH) 124 (H) 0 - 100 mg/dL    LDL/HDL RATIO 2.56    Vitamin B12    Specimen: Blood   Result Value Ref Range    Vitamin B-12 740 211 - 946 pg/mL   Vitamin D 25 Hydroxy    Specimen: Blood   Result Value Ref Range    25 Hydroxy, Vitamin D 29.6 (L) 30.0 - 100.0 ng/ml   Hemoglobin A1c    Specimen: Blood   Result Value Ref Range    Hemoglobin A1C 5.70 (H) 4.80 - 5.60 %   CBC & Differential    Specimen: Blood   Result Value Ref Range    WBC 5.29 3.40 - 10.80 10*3/mm3    RBC 4.64 3.77 - 5.28 10*6/mm3    Hemoglobin 13.5 12.0 - 15.9 g/dL    Hematocrit 41.5 34.0 - 46.6 %    MCV 89.4 79.0 - 97.0 fL    MCH 29.1 26.6 - 33.0 pg    MCHC 32.5 31.5 - 35.7 g/dL    RDW 13.3 12.3 - 15.4 %    Platelets 280 140 - 450 10*3/mm3    Neutrophil Rel % 55.0 42.7 - 76.0 %    Lymphocyte Rel % 35.0 19.6 - 45.3 %    Monocyte Rel % 7.0 5.0 - 12.0 %    Eosinophil Rel % 1.9 0.3 - 6.2 %    Basophil Rel % 0.9 0.0 - 1.5 %    Neutrophils Absolute 2.91 1.70 - 7.00 10*3/mm3    Lymphocytes Absolute 1.85 0.70 - 3.10 10*3/mm3    Monocytes Absolute 0.37 0.10 - 0.90 10*3/mm3    Eosinophils Absolute 0.10 0.00 - 0.40 10*3/mm3    Basophils Absolute 0.05 0.00 - 0.20 10*3/mm3    Immature Granulocyte Rel % 0.2 0.0 - 0.5 %     Immature Grans Absolute 0.01 0.00 - 0.05 10*3/mm3    nRBC 0.0 0.0 - 0.2 /100 WBC     Result Review :                  Assessment and Plan    Diagnoses and all orders for this visit:    1. Epigastric pain (Primary)  -     CT Abdomen Pelvis With Contrast; Future  -     Comprehensive Metabolic Panel; Future  -     CBC & Differential; Future  -     Amylase; Future  -     Lipase; Future    2. Right upper quadrant pain  -     CT Abdomen Pelvis With Contrast; Future  -     Comprehensive Metabolic Panel; Future  -     CBC & Differential; Future  -     Amylase; Future  -     Lipase; Future    3. Recurrent pancreatitis  -     CT Abdomen Pelvis With Contrast; Future  -     Comprehensive Metabolic Panel; Future  -     CBC & Differential; Future  -     Amylase; Future  -     Lipase; Future  -     Lipid panel; Future       Pt sent for stat labs and ct today.  Further mgt based on results.  Push fluids only. Hold on food that might exacerbate pancreatitis.     I spent 30 minutes caring for Neville on this date of service. This time includes time spent by me in the following activities:preparing for the visit, reviewing tests, performing a medically appropriate examination and/or evaluation , counseling and educating the patient/family/caregiver, ordering medications, tests, or procedures, documenting information in the medical record, care coordination and ordering and interpretation of stat imaging and labs      Outpatient Medications Prior to Visit   Medication Sig Dispense Refill   • ALPRAZolam XR (XANAX XR) 0.5 MG 24 hr tablet Take 1 tablet by mouth Daily As Needed for Anxiety. 30 tablet 3   • azelastine (ASTEPRO) 0.15 % solution nasal spray 2 sprays into each nostril 2 (Two) Times a Day. (Patient taking differently: 2 sprays into the nostril(s) as directed by provider 2 (Two) Times a Day As Needed.) 30 mL 1   • estradiol (ESTRACE) 0.1 MG/GM vaginal cream INSERT 1 GRAM VAGINALLY TWICE A WEEK 42.5 g 2   •  losartan-hydrochlorothiazide (HYZAAR) 100-12.5 MG per tablet TAKE 1 TABLET DAILY 90 tablet 3   • PrilOSEC 20 MG capsule Take 1 capsule by mouth Daily. 90 capsule 3   • TRINTELLIX 10 MG tablet      • Cholecalciferol (Vitamin D3) 1.25 MG (56048 UT) tablet Take 1 tablet by mouth Every 7 (Seven) Days. 13 tablet 3     No facility-administered medications prior to visit.     No orders of the defined types were placed in this encounter.    [unfilled]  There are no discontinued medications.      Return based on imaging and lab results.    Patient was given instructions and counseling regarding her condition or for health maintenance advice. Please see specific information pulled into the AVS if appropriate.

## 2021-07-30 ENCOUNTER — APPOINTMENT (OUTPATIENT)
Dept: MAMMOGRAPHY | Facility: HOSPITAL | Age: 56
End: 2021-07-30

## 2021-07-30 ENCOUNTER — APPOINTMENT (OUTPATIENT)
Dept: BONE DENSITY | Facility: HOSPITAL | Age: 56
End: 2021-07-30

## 2021-08-03 ENCOUNTER — APPOINTMENT (OUTPATIENT)
Dept: MAMMOGRAPHY | Facility: HOSPITAL | Age: 56
End: 2021-08-03

## 2021-08-23 ENCOUNTER — TELEPHONE (OUTPATIENT)
Dept: INTERNAL MEDICINE | Facility: CLINIC | Age: 56
End: 2021-08-23

## 2021-08-23 NOTE — TELEPHONE ENCOUNTER
Caller: Neville Rodriges    Relationship: Self    Best call back number: [phone] (M)    What orders are you requesting (i.e. lab or imaging): DEXA SCAN    Where will you receive your lab/imaging services: [facility]    Additional notes: PATIENT STATES LAST DEXA SCAN  APPOINTMENT WAS CANCELLED

## 2021-08-30 ENCOUNTER — HOSPITAL ENCOUNTER (OUTPATIENT)
Dept: MAMMOGRAPHY | Facility: HOSPITAL | Age: 56
Discharge: HOME | End: 2021-08-30

## 2021-08-30 ENCOUNTER — APPOINTMENT (OUTPATIENT)
Dept: BONE DENSITY | Facility: HOSPITAL | Age: 56
End: 2021-08-30

## 2021-08-30 DIAGNOSIS — Z12.31 ENCOUNTER FOR SCREENING MAMMOGRAM FOR BREAST CANCER: ICD-10-CM

## 2021-08-30 DIAGNOSIS — Z78.0 MENOPAUSE: ICD-10-CM

## 2021-08-30 PROCEDURE — 77063 BREAST TOMOSYNTHESIS BI: CPT

## 2021-08-30 PROCEDURE — 77080 DXA BONE DENSITY AXIAL: CPT

## 2021-08-30 PROCEDURE — 77067 SCR MAMMO BI INCL CAD: CPT

## 2021-08-31 ENCOUNTER — TELEPHONE (OUTPATIENT)
Dept: INTERNAL MEDICINE | Facility: CLINIC | Age: 56
End: 2021-08-31

## 2021-08-31 NOTE — TELEPHONE ENCOUNTER
----- Message from Meryl Mesa MD sent at 8/30/2021 11:21 AM EDT -----  Dexa shows osteopenia.  Rec daily ca/vit D (1200mg calcium and 800iu vit d) and daily weight bearing exercises.  Repeat dexa scan in 2 years.

## 2021-08-31 NOTE — TELEPHONE ENCOUNTER
----- Message from Meryl Mesa MD sent at 8/30/2021  4:41 PM EDT -----  Mammogram normal.  Repeat 1 year

## 2021-08-31 NOTE — TELEPHONE ENCOUNTER
Hub please inform patients, the results of   Dexa shows osteopenia.  Rec daily ca/vit D (1200mg calcium and 800iu vit d) and daily weight bearing exercises.  Repeat dexa scan in 2 years.

## 2022-01-18 DIAGNOSIS — I10 ESSENTIAL HYPERTENSION: ICD-10-CM

## 2022-01-18 RX ORDER — LOSARTAN POTASSIUM AND HYDROCHLOROTHIAZIDE 12.5; 1 MG/1; MG/1
TABLET ORAL
Qty: 90 TABLET | Refills: 3 | Status: SHIPPED | OUTPATIENT
Start: 2022-01-18 | End: 2023-01-11 | Stop reason: SDUPTHER

## 2022-01-20 RX ORDER — ESTRADIOL 0.1 MG/G
CREAM VAGINAL
Qty: 42.5 G | Refills: 1 | Status: SHIPPED | OUTPATIENT
Start: 2022-01-20 | End: 2022-11-16

## 2022-03-11 ENCOUNTER — OFFICE VISIT (OUTPATIENT)
Dept: INTERNAL MEDICINE | Facility: CLINIC | Age: 57
End: 2022-03-11

## 2022-03-11 VITALS
TEMPERATURE: 97.7 F | OXYGEN SATURATION: 96 % | WEIGHT: 186 LBS | SYSTOLIC BLOOD PRESSURE: 108 MMHG | DIASTOLIC BLOOD PRESSURE: 70 MMHG | BODY MASS INDEX: 31.91 KG/M2 | RESPIRATION RATE: 15 BRPM | HEART RATE: 71 BPM

## 2022-03-11 DIAGNOSIS — L65.9 HAIR LOSS: ICD-10-CM

## 2022-03-11 DIAGNOSIS — M77.8 LEFT ELBOW TENDINITIS: Primary | ICD-10-CM

## 2022-03-11 PROCEDURE — 99214 OFFICE O/P EST MOD 30 MIN: CPT | Performed by: INTERNAL MEDICINE

## 2022-03-11 RX ORDER — LORATADINE 10 MG/1
10 TABLET ORAL
COMMUNITY
Start: 2022-01-22 | End: 2023-01-11

## 2022-03-11 RX ORDER — METHYLPREDNISOLONE 4 MG/1
TABLET ORAL
Qty: 21 TABLET | Refills: 0 | Status: SHIPPED | OUTPATIENT
Start: 2022-03-11 | End: 2022-08-20

## 2022-03-11 NOTE — PROGRESS NOTES
Neville Rodriges is a 56 y.o. female, who presents with a chief complaint of   Chief Complaint   Patient presents with   • Alopecia   • Pain     Left elbow           HPI   Pt here bc of concerns about hair loss. The hair loss is generalized.  She has gotten a satin pillow and cut down on how much she washes her hair. No bald patches.  No recent illness or big stressors.  thyroid labs normal in June 2021.  She has had more hair loss since menopause about 4 years ago.     Pt has been having pain in her left elbow.  She is s/p fracture and has a metal plate in her arm.  She has taken mobic and motrin.  She cant get back in with dr. Ricci for another 5 weeks.        The following portions of the patient's history were reviewed and updated as appropriate: allergies, current medications, past family history, past medical history, past social history, past surgical history and problem list.    Allergies: Paroxetine, Cefuroxime axetil, Cephalosporins, Ciprofloxacin, Metronidazole, and Paroxetine hcl    Review of Systems   Constitutional: Negative for fatigue.   HENT: Negative.    Eyes: Negative.    Respiratory: Negative.  Negative for cough.    Cardiovascular: Negative for leg swelling.   Gastrointestinal: Negative.    Endocrine: Negative.    Genitourinary: Negative.    Musculoskeletal: Negative.    Skin: Negative.    Allergic/Immunologic: Negative.    Neurological: Negative for dizziness.   Hematological: Negative.    Psychiatric/Behavioral: Negative.    All other systems reviewed and are negative.            Wt Readings from Last 3 Encounters:   03/11/22 84.4 kg (186 lb)   07/27/21 81.6 kg (180 lb)   06/21/21 82.2 kg (181 lb 3.2 oz)     Temp Readings from Last 3 Encounters:   03/11/22 97.7 °F (36.5 °C) (Temporal)   07/27/21 96.9 °F (36.1 °C) (Temporal)   06/21/21 96.8 °F (36 °C) (Temporal)     BP Readings from Last 3 Encounters:   03/11/22 108/70   07/27/21 130/80   06/21/21 106/80     Pulse Readings from Last 3  Encounters:   03/11/22 71   07/27/21 94   06/21/21 72     Body mass index is 31.91 kg/m².  SpO2 Readings from Last 3 Encounters:   03/11/22 96%   07/27/21 96%   06/21/21 98%          Physical Exam    Results for orders placed or performed during the hospital encounter of 01/06/22   COVID-19,[facility] ROUTINE, NP/OP SWAB IN TRANSPORT MEDIA OR ESWAB 72 HR TAT - Swab, Nasopharynx    Specimen: Nasopharynx; Swab   Result Value Ref Range    SARS-CoV-2, HEIDI Not Detected Not Detected   COVID [facility] Priority - Swab, Nasopharynx    Specimen: Nasopharynx; Swab   Result Value Ref Range    COVID [facility] PRIORITY Comment    SARS-CoV-2, HEIDI 2 DAY TAT - Swab, Nasopharynx    Specimen: Nasopharynx; Swab   Result Value Ref Range    [facility] SARS-COV-2, HEIDI 2 DAY TAT Performed      Result Review :                  Assessment and Plan    Diagnoses and all orders for this visit:    1. Left elbow tendinitis (Primary)  -     methylPREDNISolone (MEDROL) 4 MG dose pack; Take as directed on package instructions.  Dispense: 21 tablet; Refill: 0  -     Ambulatory Referral to Occupational Therapy    2. Hair loss - refer to dermatology                 Outpatient Medications Prior to Visit   Medication Sig Dispense Refill   • ALPRAZolam XR (XANAX XR) 0.5 MG 24 hr tablet Take 1 tablet by mouth Daily As Needed for Anxiety. 30 tablet 3   • azelastine (ASTEPRO) 0.15 % solution nasal spray 2 sprays into each nostril 2 (Two) Times a Day. (Patient taking differently: 2 sprays into the nostril(s) as directed by provider 2 (Two) Times a Day As Needed.) 30 mL 1   • Cholecalciferol (Vitamin D3) 1.25 MG (74888 UT) tablet Take 1 tablet by mouth Every 7 (Seven) Days. 13 tablet 3   • estradiol (ESTRACE) 0.1 MG/GM vaginal cream INSERT 1 GRAM VAGINALLY TWICE A WEEK 42.5 g 1   • loratadine (CLARITIN) 10 MG tablet Take 10 mg by mouth.     • losartan-hydrochlorothiazide (HYZAAR) 100-12.5 MG per tablet TAKE 1 TABLET DAILY 90 tablet 3   • PrilOSEC 20 MG capsule Take 1  capsule by mouth Daily. 90 capsule 3   • TRINTELLIX 10 MG tablet        No facility-administered medications prior to visit.     New Medications Ordered This Visit   Medications   • methylPREDNISolone (MEDROL) 4 MG dose pack     Sig: Take as directed on package instructions.     Dispense:  21 tablet     Refill:  0     [unfilled]  There are no discontinued medications.      No follow-ups on file.    Patient was given instructions and counseling regarding her condition or for health maintenance advice. Please see specific information pulled into the AVS if appropriate.

## 2022-07-26 DIAGNOSIS — E55.9 VITAMIN D DEFICIENCY: ICD-10-CM

## 2022-07-26 DIAGNOSIS — K21.9 GASTROESOPHAGEAL REFLUX DISEASE, UNSPECIFIED WHETHER ESOPHAGITIS PRESENT: ICD-10-CM

## 2022-07-26 RX ORDER — OMEPRAZOLE 20 MG/1
CAPSULE, DELAYED RELEASE ORAL
Qty: 90 CAPSULE | Refills: 1 | Status: SHIPPED | OUTPATIENT
Start: 2022-07-26 | End: 2022-11-28 | Stop reason: SDUPTHER

## 2022-07-26 NOTE — TELEPHONE ENCOUNTER
Rx Refill Note  Requested Prescriptions     Pending Prescriptions Disp Refills   • omeprazole (priLOSEC) 20 MG capsule [Pharmacy Med Name: OMEPRAZOLE DR 20 MG CAPSULE] 30 capsule      Sig: TAKE ONE CAPSULE BY MOUTH DAILY      Last office visit with prescribing clinician: 3/11/2022      Next office visit with prescribing clinician: Visit date not found            Aileen Kumar MA  07/26/22, 16:08 EDT

## 2022-07-27 RX ORDER — CHOLECALCIFEROL (VITAMIN D3) 1250 MCG
CAPSULE ORAL
Qty: 13 CAPSULE | Refills: 4 | Status: SHIPPED | OUTPATIENT
Start: 2022-07-27 | End: 2022-08-29 | Stop reason: SDUPTHER

## 2022-07-27 NOTE — TELEPHONE ENCOUNTER
Rx Refill Note  Requested Prescriptions     Pending Prescriptions Disp Refills    Cholecalciferol (Vitamin D3) 1.25 MG (59368 UT) capsule [Pharmacy Med Name: VITAMIN D3 (CHOLECAL) 50,000 IU CAP] 13 capsule      Sig: TAKE ONE CAPSULE BY MOUTH ONCE WEEKLY      Last office visit with prescribing clinician: 3/11/2022      Next office visit with prescribing clinician: Visit date not found            TC COLINDRES MA  07/27/22, 07:45 EDT

## 2022-08-29 DIAGNOSIS — E55.9 VITAMIN D DEFICIENCY: ICD-10-CM

## 2022-08-29 RX ORDER — CHOLECALCIFEROL (VITAMIN D3) 1250 MCG
50000 CAPSULE ORAL WEEKLY
Qty: 13 CAPSULE | Refills: 4 | Status: SHIPPED | OUTPATIENT
Start: 2022-08-29 | End: 2023-01-11 | Stop reason: SDUPTHER

## 2022-10-21 ENCOUNTER — TELEPHONE (OUTPATIENT)
Dept: INTERNAL MEDICINE | Facility: CLINIC | Age: 57
End: 2022-10-21

## 2022-10-21 RX ORDER — FLUCONAZOLE 150 MG/1
150 TABLET ORAL ONCE
Qty: 1 TABLET | Refills: 0 | Status: SHIPPED | OUTPATIENT
Start: 2022-10-21 | End: 2022-10-21

## 2022-10-21 RX ORDER — AMOXICILLIN 500 MG/1
1000 CAPSULE ORAL 2 TIMES DAILY
Qty: 40 CAPSULE | Refills: 0 | Status: SHIPPED | OUTPATIENT
Start: 2022-10-21 | End: 2022-10-21

## 2022-10-21 NOTE — TELEPHONE ENCOUNTER
amoxicillin and diflucan sent in.  Pt has Cipro allergy listed and Levaquin is not a 1st line med for upper respiratory infections.

## 2022-10-21 NOTE — TELEPHONE ENCOUNTER
Caller: Neville Rodriges    Relationship: Self    Best call back number: [phone]    What medication are you requesting: LEVAQUIN AND DIFLUCAN      What are your current symptoms: GREEN/BROWN DRAINAGE, RUNNING A TEMPERATURE, FACIAL PAIN, BODY ACHES    How long have you been experiencing symptoms: TWO WEEKS    Have you had these symptoms before:    [x] Yes  [] No    Have you been treated for these symptoms before:   [x] Yes  [] No    If a prescription is needed, what is your preferred pharmacy and phone number:  [facility] [id number] [place], [place] - [address] - [phone]  - [phone]    Additional notes: PATIENT STATES THAT DR. ALEXANDER HAS PRESCRIBED THESE MEDICATIONS IN THE PAST.     PLEASE ADVISE PATIENT.

## 2022-10-24 NOTE — TELEPHONE ENCOUNTER
Called and spoke with pt, pt stated she is not allergic to Levaquin and that she went to [facility] and had them prescribe it

## 2022-11-15 NOTE — TELEPHONE ENCOUNTER
Rx Refill Note  Requested Prescriptions     Pending Prescriptions Disp Refills    estradiol (ESTRACE) 0.1 MG/GM vaginal cream [Pharmacy Med Name: ESTRADIOL VAG CRM W/APPL 42.5GM 0.01%] 42.5 g 1     Sig: INSERT 1 GRAM VAGINALLY TWICE A WEEK      Last office visit with prescribing clinician: Visit date not found      Next office visit with prescribing clinician: Visit date not found            Aileen Kumar MA  11/15/22, 11:58 EST

## 2022-11-16 RX ORDER — ESTRADIOL 0.1 MG/G
CREAM VAGINAL
Qty: 42.5 G | Refills: 1 | Status: SHIPPED | OUTPATIENT
Start: 2022-11-16

## 2022-11-28 DIAGNOSIS — K21.9 GASTROESOPHAGEAL REFLUX DISEASE, UNSPECIFIED WHETHER ESOPHAGITIS PRESENT: ICD-10-CM

## 2022-11-28 RX ORDER — OMEPRAZOLE 20 MG/1
20 CAPSULE, DELAYED RELEASE ORAL DAILY
Qty: 90 CAPSULE | Refills: 1 | Status: SHIPPED | OUTPATIENT
Start: 2022-11-28 | End: 2023-01-11 | Stop reason: SDUPTHER

## 2022-11-28 NOTE — TELEPHONE ENCOUNTER
Rx Refill Note  Requested Prescriptions     Pending Prescriptions Disp Refills   • omeprazole (priLOSEC) 20 MG capsule 90 capsule 1     Sig: Take 1 capsule by mouth Daily.      Last office visit with prescribing clinician: 3/11/2022      Next office visit with prescribing clinician: 12/9/2022            Deja Lagos MA  11/28/22, 14:07 EST

## 2023-01-05 ENCOUNTER — TELEPHONE (OUTPATIENT)
Dept: INTERNAL MEDICINE | Facility: CLINIC | Age: 58
End: 2023-01-05

## 2023-01-05 NOTE — TELEPHONE ENCOUNTER
Caller: Neville Rodriges    Relationship to patient: Self    Best call back number: [phone]    Chief complaint: LABS    Type of visit: LABS    Requested date: BEFORE 1/11/23       Additional notes:PLEASE CALL TO SCHEDULE LABS

## 2023-01-05 NOTE — TELEPHONE ENCOUNTER
Called and spoke with patient and successfully scheduled lab appoint prior to upcoming f-u/CPE. HUB to share.

## 2023-01-06 DIAGNOSIS — E78.2 MIXED HYPERLIPIDEMIA: ICD-10-CM

## 2023-01-06 DIAGNOSIS — E55.9 VITAMIN D DEFICIENCY: Primary | ICD-10-CM

## 2023-01-06 DIAGNOSIS — R73.03 PREDIABETES: ICD-10-CM

## 2023-01-06 DIAGNOSIS — I10 ESSENTIAL HYPERTENSION: ICD-10-CM

## 2023-01-07 LAB
25(OH)D3+25(OH)D2 SERPL-MCNC: 55.3 NG/ML (ref 30–100)
ALBUMIN SERPL-MCNC: 4.7 G/DL (ref 3.5–5.2)
ALBUMIN/GLOB SERPL: 2.8 G/DL
ALP SERPL-CCNC: 79 U/L (ref 39–117)
ALT SERPL-CCNC: 15 U/L (ref 1–33)
AST SERPL-CCNC: 16 U/L (ref 1–32)
BASOPHILS # BLD AUTO: 0.04 10*3/MM3 (ref 0–0.2)
BASOPHILS NFR BLD AUTO: 0.7 % (ref 0–1.5)
BILIRUB SERPL-MCNC: 0.4 MG/DL (ref 0–1.2)
BUN SERPL-MCNC: 19 MG/DL (ref 6–20)
BUN/CREAT SERPL: 20 (ref 7–25)
CALCIUM SERPL-MCNC: 9.5 MG/DL (ref 8.6–10.5)
CHLORIDE SERPL-SCNC: 103 MMOL/L (ref 98–107)
CHOLEST SERPL-MCNC: 217 MG/DL (ref 0–200)
CO2 SERPL-SCNC: 26.5 MMOL/L (ref 22–29)
CREAT SERPL-MCNC: 0.95 MG/DL (ref 0.57–1)
EGFRCR SERPLBLD CKD-EPI 2021: 69.2 ML/MIN/1.73
EOSINOPHIL # BLD AUTO: 0.01 10*3/MM3 (ref 0–0.4)
EOSINOPHIL NFR BLD AUTO: 0.2 % (ref 0.3–6.2)
ERYTHROCYTE [DISTWIDTH] IN BLOOD BY AUTOMATED COUNT: 13.1 % (ref 12.3–15.4)
GLOBULIN SER CALC-MCNC: 1.7 GM/DL
GLUCOSE SERPL-MCNC: 102 MG/DL (ref 65–99)
HBA1C MFR BLD: 5.5 % (ref 4.8–5.6)
HCT VFR BLD AUTO: 41.2 % (ref 34–46.6)
HDLC SERPL-MCNC: 49 MG/DL (ref 40–60)
HGB BLD-MCNC: 13.5 G/DL (ref 12–15.9)
IMM GRANULOCYTES # BLD AUTO: 0.02 10*3/MM3 (ref 0–0.05)
IMM GRANULOCYTES NFR BLD AUTO: 0.3 % (ref 0–0.5)
LDLC SERPL CALC-MCNC: 137 MG/DL (ref 0–100)
LDLC/HDLC SERPL: 2.71 {RATIO}
LYMPHOCYTES # BLD AUTO: 1.9 10*3/MM3 (ref 0.7–3.1)
LYMPHOCYTES NFR BLD AUTO: 32.6 % (ref 19.6–45.3)
MCH RBC QN AUTO: 28.6 PG (ref 26.6–33)
MCHC RBC AUTO-ENTMCNC: 32.8 G/DL (ref 31.5–35.7)
MCV RBC AUTO: 87.3 FL (ref 79–97)
MONOCYTES # BLD AUTO: 0.45 10*3/MM3 (ref 0.1–0.9)
MONOCYTES NFR BLD AUTO: 7.7 % (ref 5–12)
NEUTROPHILS # BLD AUTO: 3.4 10*3/MM3 (ref 1.7–7)
NEUTROPHILS NFR BLD AUTO: 58.5 % (ref 42.7–76)
NRBC BLD AUTO-RTO: 0 /100 WBC (ref 0–0.2)
PLATELET # BLD AUTO: 279 10*3/MM3 (ref 140–450)
POTASSIUM SERPL-SCNC: 4.2 MMOL/L (ref 3.5–5.2)
PROT SERPL-MCNC: 6.4 G/DL (ref 6–8.5)
RBC # BLD AUTO: 4.72 10*6/MM3 (ref 3.77–5.28)
SODIUM SERPL-SCNC: 140 MMOL/L (ref 136–145)
T4 FREE SERPL-MCNC: 1.02 NG/DL (ref 0.93–1.7)
TRIGL SERPL-MCNC: 175 MG/DL (ref 0–150)
TSH SERPL DL<=0.005 MIU/L-ACNC: 1.85 UIU/ML (ref 0.27–4.2)
VLDLC SERPL CALC-MCNC: 31 MG/DL (ref 5–40)
WBC # BLD AUTO: 5.82 10*3/MM3 (ref 3.4–10.8)

## 2023-01-11 ENCOUNTER — OFFICE VISIT (OUTPATIENT)
Dept: INTERNAL MEDICINE | Facility: CLINIC | Age: 58
End: 2023-01-11
Payer: COMMERCIAL

## 2023-01-11 VITALS
HEART RATE: 77 BPM | BODY MASS INDEX: 31.79 KG/M2 | DIASTOLIC BLOOD PRESSURE: 70 MMHG | HEIGHT: 64 IN | SYSTOLIC BLOOD PRESSURE: 122 MMHG | WEIGHT: 186.2 LBS | OXYGEN SATURATION: 97 % | TEMPERATURE: 97.8 F

## 2023-01-11 DIAGNOSIS — I10 ESSENTIAL HYPERTENSION: ICD-10-CM

## 2023-01-11 DIAGNOSIS — E55.9 VITAMIN D DEFICIENCY: ICD-10-CM

## 2023-01-11 DIAGNOSIS — K21.9 GASTROESOPHAGEAL REFLUX DISEASE, UNSPECIFIED WHETHER ESOPHAGITIS PRESENT: ICD-10-CM

## 2023-01-11 DIAGNOSIS — F41.9 ANXIETY: ICD-10-CM

## 2023-01-11 DIAGNOSIS — Z12.31 BREAST CANCER SCREENING BY MAMMOGRAM: ICD-10-CM

## 2023-01-11 DIAGNOSIS — Z00.00 HEALTHCARE MAINTENANCE: Primary | ICD-10-CM

## 2023-01-11 PROCEDURE — 99214 OFFICE O/P EST MOD 30 MIN: CPT | Performed by: INTERNAL MEDICINE

## 2023-01-11 PROCEDURE — 99396 PREV VISIT EST AGE 40-64: CPT | Performed by: INTERNAL MEDICINE

## 2023-01-11 RX ORDER — CHOLECALCIFEROL (VITAMIN D3) 1250 MCG
50000 CAPSULE ORAL WEEKLY
Qty: 13 CAPSULE | Refills: 4 | Status: SHIPPED | OUTPATIENT
Start: 2023-01-11 | End: 2023-01-13 | Stop reason: SDUPTHER

## 2023-01-11 RX ORDER — LOSARTAN POTASSIUM AND HYDROCHLOROTHIAZIDE 12.5; 1 MG/1; MG/1
1 TABLET ORAL DAILY
Qty: 90 TABLET | Refills: 3 | Status: SHIPPED | OUTPATIENT
Start: 2023-01-11 | End: 2023-01-13

## 2023-01-11 RX ORDER — ALPRAZOLAM 0.5 MG/1
0.5 TABLET, EXTENDED RELEASE ORAL DAILY PRN
Qty: 30 TABLET | Refills: 0 | Status: SHIPPED | OUTPATIENT
Start: 2023-01-11 | End: 2023-01-13 | Stop reason: SDUPTHER

## 2023-01-11 RX ORDER — OMEPRAZOLE 40 MG/1
40 CAPSULE, DELAYED RELEASE ORAL DAILY
Qty: 90 CAPSULE | Refills: 3 | Status: SHIPPED | OUTPATIENT
Start: 2023-01-11 | End: 2023-01-13 | Stop reason: SDUPTHER

## 2023-01-11 NOTE — PROGRESS NOTES
Neville Rodriges is a 57 y.o. female, who presents with a chief complaint of   Chief Complaint   Patient presents with   • Annual Exam           HPI   Pt her for f/u and annual exam.  She is moving to [place] soon.      c-scope/egd scheduled later this month.      She likes to walk/hike for exercise.      she takes xanax when she flies    Due for mammogram.  No recent pap.  S/p hysterectomy.  Pt says they did leave her cervix.  rec pap    HTN- well controlled    gerd - breakthrough sx on omeprazole 20mg daily     HLD - cholesterol up some.  She is 1 week into a keto diet.   The 10-year ASCVD risk score (Arlene SCHMID, et al., 2019) is: 4.2%    Values used to calculate the score:      Age: 57 years      Sex: Female      Is Non- : No      Diabetic: No      Tobacco smoker: No      Systolic Blood Pressure: 122 mmHg      Is BP treated: Yes      HDL Cholesterol: 49 mg/dL      Total Cholesterol: 217 mg/dL    Prediabetes - a1c 5.7 -> 5.5.  Fasting glucose still elevated.       The following portions of the patient's history were reviewed and updated as appropriate: allergies, current medications, past family history, past medical history, past social history, past surgical history and problem list.    Allergies: Paroxetine, Cefuroxime axetil, Cephalosporins, Ciprofloxacin, Metronidazole, and Paroxetine hcl    Review of Systems   Constitutional: Negative.    HENT: Negative.    Eyes: Negative.    Respiratory: Negative.    Cardiovascular: Negative.    Gastrointestinal: Negative.    Endocrine: Negative.    Genitourinary: Negative.    Musculoskeletal: Negative.    Skin: Negative.    Allergic/Immunologic: Negative.    Neurological: Negative.    Hematological: Negative.    Psychiatric/Behavioral: Negative.    All other systems reviewed and are negative.            Wt Readings from Last 3 Encounters:   01/11/23 84.5 kg (186 lb 3.2 oz)   10/21/22 81.6 kg (180 lb)   08/20/22 81.6 kg (180 lb)     Temp Readings  from Last 3 Encounters:   01/11/23 97.8 °F (36.6 °C)   10/21/22 98.2 °F (36.8 °C) (Infrared)   08/20/22 98.2 °F (36.8 °C) (Temporal)     BP Readings from Last 3 Encounters:   01/11/23 122/70   10/21/22 131/91   08/20/22 124/75     Pulse Readings from Last 3 Encounters:   01/11/23 77   10/21/22 102   08/20/22 76     Body mass index is 31.95 kg/m².  SpO2 Readings from Last 3 Encounters:   01/11/23 97%   10/21/22 97%   08/20/22 98%          Physical Exam  Vitals and nursing note reviewed.   Constitutional:       General: She is not in acute distress.     Appearance: She is well-developed.   HENT:      Head: Normocephalic and atraumatic.      Right Ear: External ear normal.      Left Ear: External ear normal.      Nose: Nose normal.   Eyes:      Conjunctiva/sclera: Conjunctivae normal.      Pupils: Pupils are equal, round, and reactive to light.   Cardiovascular:      Rate and Rhythm: Normal rate and regular rhythm.      Heart sounds: Normal heart sounds.   Pulmonary:      Effort: Pulmonary effort is normal. No respiratory distress.      Breath sounds: Normal breath sounds. No wheezing.   Musculoskeletal:         General: Normal range of motion.      Cervical back: Normal range of motion and neck supple.      Comments: Normal gait   Skin:     General: Skin is warm and dry.   Neurological:      Mental Status: She is alert and oriented to person, place, and time.   Psychiatric:         Behavior: Behavior normal.         Thought Content: Thought content normal.         Judgment: Judgment normal.         Results for orders placed or performed in visit on 01/06/23   Comprehensive Metabolic Panel    Specimen: Blood   Result Value Ref Range    Glucose 102 (H) 65 - 99 mg/dL    BUN 19 6 - 20 mg/dL    Creatinine 0.95 0.57 - 1.00 mg/dL    EGFR Result 69.2 >60.0 mL/min/1.73    BUN/Creatinine Ratio 20.0 7.0 - 25.0    Sodium 140 136 - 145 mmol/L    Potassium 4.2 3.5 - 5.2 mmol/L    Chloride 103 98 - 107 mmol/L    Total CO2 26.5 22.0  - 29.0 mmol/L    Calcium 9.5 8.6 - 10.5 mg/dL    Total Protein 6.4 6.0 - 8.5 g/dL    Albumin 4.7 3.5 - 5.2 g/dL    Globulin 1.7 gm/dL    A/G Ratio 2.8 g/dL    Total Bilirubin 0.4 0.0 - 1.2 mg/dL    Alkaline Phosphatase 79 39 - 117 U/L    AST (SGOT) 16 1 - 32 U/L    ALT (SGPT) 15 1 - 33 U/L   Hemoglobin A1c    Specimen: Blood   Result Value Ref Range    Hemoglobin A1C 5.50 4.80 - 5.60 %   Lipid Panel With LDL / HDL Ratio    Specimen: Blood   Result Value Ref Range    Total Cholesterol 217 (H) 0 - 200 mg/dL    Triglycerides 175 (H) 0 - 150 mg/dL    HDL Cholesterol 49 40 - 60 mg/dL    VLDL Cholesterol Surya 31 5 - 40 mg/dL    LDL Chol Calc (NIH) 137 (H) 0 - 100 mg/dL    LDL/HDL RATIO 2.71    T4, Free    Specimen: Blood   Result Value Ref Range    Free T4 1.02 0.93 - 1.70 ng/dL   TSH    Specimen: Blood   Result Value Ref Range    TSH 1.850 0.270 - 4.200 uIU/mL   Vitamin D,25-Hydroxy    Specimen: Blood   Result Value Ref Range    25 Hydroxy, Vitamin D 55.3 30.0 - 100.0 ng/ml   CBC & Differential    Specimen: Blood   Result Value Ref Range    WBC 5.82 3.40 - 10.80 10*3/mm3    RBC 4.72 3.77 - 5.28 10*6/mm3    Hemoglobin 13.5 12.0 - 15.9 g/dL    Hematocrit 41.2 34.0 - 46.6 %    MCV 87.3 79.0 - 97.0 fL    MCH 28.6 26.6 - 33.0 pg    MCHC 32.8 31.5 - 35.7 g/dL    RDW 13.1 12.3 - 15.4 %    Platelets 279 140 - 450 10*3/mm3    Neutrophil Rel % 58.5 42.7 - 76.0 %    Lymphocyte Rel % 32.6 19.6 - 45.3 %    Monocyte Rel % 7.7 5.0 - 12.0 %    Eosinophil Rel % 0.2 (L) 0.3 - 6.2 %    Basophil Rel % 0.7 0.0 - 1.5 %    Neutrophils Absolute 3.40 1.70 - 7.00 10*3/mm3    Lymphocytes Absolute 1.90 0.70 - 3.10 10*3/mm3    Monocytes Absolute 0.45 0.10 - 0.90 10*3/mm3    Eosinophils Absolute 0.01 0.00 - 0.40 10*3/mm3    Basophils Absolute 0.04 0.00 - 0.20 10*3/mm3    Immature Granulocyte Rel % 0.3 0.0 - 0.5 %    Immature Grans Absolute 0.02 0.00 - 0.05 10*3/mm3    nRBC 0.0 0.0 - 0.2 /100 WBC     Result Review :                  Assessment and Plan     Diagnoses and all orders for this visit:    1. Breast cancer screening by mammogram (Primary)  -     Mammo Screening Digital Tomosynthesis Bilateral With CAD; Future    2. Anxiety  -     ALPRAZolam XR (XANAX XR) 0.5 MG 24 hr tablet; Take 1 tablet by mouth Daily As Needed for Anxiety.  Dispense: 30 tablet; Refill: 0    3. Essential hypertension  -     losartan-hydrochlorothiazide (HYZAAR) 100-12.5 MG per tablet; Take 1 tablet by mouth Daily.  Dispense: 90 tablet; Refill: 3    4. Gastroesophageal reflux disease, unspecified whether esophagitis present -increase omeprazole from 20 mg daily to 40 mg daily  -     omeprazole (priLOSEC) 40 MG capsule; Take 1 capsule by mouth Daily.  Dispense: 90 capsule; Refill: 3    5. Vitamin D deficiency  -     Cholecalciferol (Vitamin D3) 1.25 MG (06609 UT) capsule; Take 1 capsule by mouth 1 (One) Time Per Week.  Dispense: 13 capsule; Refill: 4     6. Healthcare maintenance - mammo ordered.    Pt due for pap. Discussed preventive health care issues including healthy diet, exercise, cancer screening, immunizations, and preventive care.  Hm tab updated.  Encouraged seat belt use.  No texting while driving.           Outpatient Medications Prior to Visit   Medication Sig Dispense Refill   • estradiol (ESTRACE) 0.1 MG/GM vaginal cream INSERT 1 GRAM VAGINALLY TWICE A WEEK 42.5 g 1   • Trintellix 20 MG tablet      • ALPRAZolam XR (XANAX XR) 0.5 MG 24 hr tablet Take 1 tablet by mouth Daily As Needed for Anxiety. 30 tablet 3   • Cholecalciferol (Vitamin D3) 1.25 MG (14885 UT) capsule Take 1 capsule by mouth 1 (One) Time Per Week. 13 capsule 4   • losartan-hydrochlorothiazide (HYZAAR) 100-12.5 MG per tablet TAKE 1 TABLET DAILY 90 tablet 3   • omeprazole (priLOSEC) 20 MG capsule Take 1 capsule by mouth Daily. 90 capsule 1   • azelastine (ASTEPRO) 0.15 % solution nasal spray 2 sprays into each nostril 2 (Two) Times a Day. (Patient taking differently: 2 sprays into the nostril(s) as directed by  provider 2 (Two) Times a Day As Needed.) 30 mL 1   • levoFLOXacin (LEVAQUIN) 750 MG tablet Take 1 tablet by mouth Daily. 7 tablet 0   • loratadine (CLARITIN) 10 MG tablet Take 10 mg by mouth.       No facility-administered medications prior to visit.     New Medications Ordered This Visit   Medications   • ALPRAZolam XR (XANAX XR) 0.5 MG 24 hr tablet     Sig: Take 1 tablet by mouth Daily As Needed for Anxiety.     Dispense:  30 tablet     Refill:  0   • losartan-hydrochlorothiazide (HYZAAR) 100-12.5 MG per tablet     Sig: Take 1 tablet by mouth Daily.     Dispense:  90 tablet     Refill:  3   • omeprazole (priLOSEC) 40 MG capsule     Sig: Take 1 capsule by mouth Daily.     Dispense:  90 capsule     Refill:  3   • Cholecalciferol (Vitamin D3) 1.25 MG (49717 UT) capsule     Sig: Take 1 capsule by mouth 1 (One) Time Per Week.     Dispense:  13 capsule     Refill:  4     [unfilled]  Medications Discontinued During This Encounter   Medication Reason   • levoFLOXacin (LEVAQUIN) 750 MG tablet    • azelastine (ASTEPRO) 0.15 % solution nasal spray    • loratadine (CLARITIN) 10 MG tablet    • ALPRAZolam XR (XANAX XR) 0.5 MG 24 hr tablet Reorder   • losartan-hydrochlorothiazide (HYZAAR) 100-12.5 MG per tablet Reorder   • Cholecalciferol (Vitamin D3) 1.25 MG (23573 UT) capsule Reorder   • omeprazole (priLOSEC) 20 MG capsule Reorder         Return if symptoms worsen or fail to improve.    Patient was given instructions and counseling regarding her condition or for health maintenance advice. Please see specific information pulled into the AVS if appropriate.

## 2023-01-13 ENCOUNTER — TELEPHONE (OUTPATIENT)
Dept: INTERNAL MEDICINE | Facility: CLINIC | Age: 58
End: 2023-01-13
Payer: COMMERCIAL

## 2023-01-13 DIAGNOSIS — K21.9 GASTROESOPHAGEAL REFLUX DISEASE, UNSPECIFIED WHETHER ESOPHAGITIS PRESENT: ICD-10-CM

## 2023-01-13 DIAGNOSIS — I10 ESSENTIAL HYPERTENSION: ICD-10-CM

## 2023-01-13 DIAGNOSIS — F41.9 ANXIETY: ICD-10-CM

## 2023-01-13 DIAGNOSIS — E55.9 VITAMIN D DEFICIENCY: ICD-10-CM

## 2023-01-13 RX ORDER — LOSARTAN POTASSIUM AND HYDROCHLOROTHIAZIDE 12.5; 1 MG/1; MG/1
TABLET ORAL
Qty: 90 TABLET | Refills: 3 | Status: SHIPPED | OUTPATIENT
Start: 2023-01-13

## 2023-01-13 RX ORDER — OMEPRAZOLE 40 MG/1
40 CAPSULE, DELAYED RELEASE ORAL DAILY
Qty: 90 CAPSULE | Refills: 3 | Status: SHIPPED | OUTPATIENT
Start: 2023-01-13

## 2023-01-13 NOTE — TELEPHONE ENCOUNTER
Caller: Antelmo Neville MARQUES    Relationship: Self    Best call back number: [phone] (Mobile)    Requested Prescriptions:   Requested Prescriptions     Pending Prescriptions Disp Refills   • ALPRAZolam XR (XANAX XR) 0.5 MG 24 hr tablet 30 tablet 0     Sig: Take 1 tablet by mouth Daily As Needed for Anxiety.   • omeprazole (priLOSEC) 40 MG capsule 90 capsule 3     Sig: Take 1 capsule by mouth Daily.   • Cholecalciferol (Vitamin D3) 1.25 MG (92123 UT) capsule 13 capsule 4     Sig: Take 1 capsule by mouth 1 (One) Time Per Week.        Pharmacy where request should be sent: [facility] - [address] [phone] [facility] [place] [phone] [place]     Additional details provided by patient: PATIENT NEEDS ALL OF THESE MEDS SENT TO [facility] ASAP    Does the patient have less than a 3 day supply:  [x] Yes  [] No    Would you like a call back once the refill request has been completed: [] Yes [x] No    If the office needs to give you a call back, can they leave a voicemail: [] Yes [x] No    Abigail Quintero Rep   01/13/23 14:56 EST

## 2023-01-13 NOTE — TELEPHONE ENCOUNTER
Rx Refill Note  Requested Prescriptions     Pending Prescriptions Disp Refills   • ALPRAZolam XR (XANAX XR) 0.5 MG 24 hr tablet 30 tablet 0     Sig: Take 1 tablet by mouth Daily As Needed for Anxiety.   • Cholecalciferol (Vitamin D3) 1.25 MG (57731 UT) capsule 13 capsule 4     Sig: Take 1 capsule by mouth 1 (One) Time Per Week.     Signed Prescriptions Disp Refills   • omeprazole (priLOSEC) 40 MG capsule 90 capsule 3     Sig: Take 1 capsule by mouth Daily.     Authorizing Provider: SALEEM ALEXANDER     Ordering User: APOLONIA KUMAR      Last office visit with prescribing clinician: 1/11/2023   Last telemedicine visit with prescribing clinician: Visit date not found   Next office visit with prescribing clinician: Visit date not found                         Would you like a call back once the refill request has been completed: [] Yes [] No    If the office needs to give you a call back, can they leave a voicemail: [] Yes [] No    Apolonia Kumar MA  01/13/23, 16:14 EST

## 2023-01-20 ENCOUNTER — HOSPITAL ENCOUNTER (OUTPATIENT)
Dept: MAMMOGRAPHY | Facility: HOSPITAL | Age: 58
Discharge: HOME | End: 2023-01-20
Admitting: INTERNAL MEDICINE
Payer: COMMERCIAL

## 2023-01-20 DIAGNOSIS — Z12.31 BREAST CANCER SCREENING BY MAMMOGRAM: ICD-10-CM

## 2023-01-20 PROCEDURE — 77067 SCR MAMMO BI INCL CAD: CPT

## 2023-01-20 PROCEDURE — 77063 BREAST TOMOSYNTHESIS BI: CPT

## 2023-01-24 RX ORDER — CHOLECALCIFEROL (VITAMIN D3) 1250 MCG
50000 CAPSULE ORAL WEEKLY
Qty: 13 CAPSULE | Refills: 4 | Status: SHIPPED | OUTPATIENT
Start: 2023-01-24

## 2023-01-24 RX ORDER — ALPRAZOLAM 0.5 MG/1
0.5 TABLET, EXTENDED RELEASE ORAL DAILY PRN
Qty: 30 TABLET | Refills: 0 | Status: SHIPPED | OUTPATIENT
Start: 2023-01-24

## 2023-01-26 ENCOUNTER — OFFICE (OUTPATIENT)
Dept: URBAN - METROPOLITAN AREA PATHOLOGY 4 | Facility: PATHOLOGY | Age: 58
End: 2023-01-26
Payer: OTHER GOVERNMENT

## 2023-01-26 ENCOUNTER — AMBULATORY SURGICAL CENTER (OUTPATIENT)
Dept: URBAN - METROPOLITAN AREA SURGERY 20 | Facility: SURGERY | Age: 58
End: 2023-01-26
Payer: COMMERCIAL

## 2023-01-26 VITALS — HEIGHT: 64 IN

## 2023-01-26 DIAGNOSIS — K57.30 DIVERTICULOSIS OF LARGE INTESTINE WITHOUT PERFORATION OR ABS: ICD-10-CM

## 2023-01-26 DIAGNOSIS — Z86.010 PERSONAL HISTORY OF COLONIC POLYPS: ICD-10-CM

## 2023-01-26 DIAGNOSIS — K63.5 POLYP OF COLON: ICD-10-CM

## 2023-01-26 DIAGNOSIS — K64.1 SECOND DEGREE HEMORRHOIDS: ICD-10-CM

## 2023-01-26 DIAGNOSIS — D12.3 BENIGN NEOPLASM OF TRANSVERSE COLON: ICD-10-CM

## 2023-01-26 DIAGNOSIS — K44.9 DIAPHRAGMATIC HERNIA WITHOUT OBSTRUCTION OR GANGRENE: ICD-10-CM

## 2023-01-26 DIAGNOSIS — K31.89 OTHER DISEASES OF STOMACH AND DUODENUM: ICD-10-CM

## 2023-01-26 DIAGNOSIS — Z12.11 ENCOUNTER FOR SCREENING FOR MALIGNANT NEOPLASM OF COLON: ICD-10-CM

## 2023-01-26 DIAGNOSIS — K20.90 ESOPHAGITIS, UNSPECIFIED WITHOUT BLEEDING: ICD-10-CM

## 2023-01-26 DIAGNOSIS — K29.70 GASTRITIS, UNSPECIFIED, WITHOUT BLEEDING: ICD-10-CM

## 2023-01-26 PROBLEM — Z48.815 ENCOUNTER FOR SURGICAL AFTERCARE FOLLOWING SURGERY ON THE DI: Status: ACTIVE | Noted: 2023-01-26

## 2023-01-26 LAB
GI HISTOLOGY: A. SELECT: (no result)
GI HISTOLOGY: B. SELECT: (no result)
GI HISTOLOGY: PDF REPORT: (no result)

## 2023-01-26 PROCEDURE — 88305 TISSUE EXAM BY PATHOLOGIST: CPT | Performed by: INTERNAL MEDICINE

## 2023-01-26 PROCEDURE — 43239 EGD BIOPSY SINGLE/MULTIPLE: CPT | Performed by: INTERNAL MEDICINE

## 2023-01-26 PROCEDURE — 45380 COLONOSCOPY AND BIOPSY: CPT | Mod: 33 | Performed by: INTERNAL MEDICINE

## 2023-01-26 NOTE — SERVICEHPINOTES
reflux symptoms,  just increased omeprazole to 40 mg daily,  but does not use routinely, no dysphagia

## 2023-03-10 NOTE — PROGRESS NOTES
Orders for admission, patient is aware of plan and ready to go upstairs. Any questions, please call ED RN Anya Gone at extension [phone].      Patient Covid vaccination status: Unvaccinated     COVID Test Ordered in ED: Rapid SARS-CoV-2 by PCR    COVID Suspicion at Admission: N/A    Running Infusions:  None    Mental Status/LOC at time of transport: A&Ox4    Other pertinent information:   CIWA score: N/A   NIH score:  N/A Subjective:     Patient ID: Neville Rodriges is a 51 y.o. female.    Chief Complaint: Right ankle sprain, 03/18/2017    History of Present Illness    Patient is 51 y.o female who presents with a reported 2 day history after twisting ankle while coming down steps on vacation in [place]. Pain present at anterior, medial and lateral aspect right ankle, increased pain with all ambulatory activities. Positive for swelling at lateral aspect, positive for instability right ankle. Has been able to apply pressure yet painful. Pain radiating up into leg. Rates pain at 7 out of 10. Has been resting, elevating and applying ice. Currently taking meloxicam to help with pain and swelling as previously prescribed. States she took meloxicam with her on her trip to [place] because she knew she would be walking a lot. Denies presence of numbness or tingling right lower extremity. Denies all other concerns present at this time.      Social History     Occupational History   • Not on file.     Social History Main Topics   • Smoking status: Never Smoker   • Smokeless tobacco: Not on file   • Alcohol use 0.6 oz/week     1 Glasses of wine per week      Comment: 2 X PER MONTH    • Drug use: No   • Sexual activity: Not on file      Past Medical History   Diagnosis Date   • Colon polyps      c-scope was fall 2014. due for repeat in 2-3 years.   • Depression    • Gastroesophageal reflux disease 2/28/2017   • Headache      More problems when big fluctuations in weather   • Humerus fracture    • Hypercholesterolemia      High Triglycerides   • Hypertension    • Sleep apnea      Past Surgical History   Procedure Laterality Date   • Appendectomy     • Cholecystectomy     • Tonsillectomy     • Stomach surgery  12/10/2013     Gastric sleve   • Hysterectomy       Partial   • Sinus surgery         Family History   Problem Relation Age of Onset   • Diabetes Mother    • Anxiety disorder Mother    • Depression Mother    • Diabetes Father    • Heart disease  "Father      CABG x 3 in early 60s   • Hypertension Father    • Anxiety disorder Sister    • Depression Sister    • Cancer Daughter      Synovial Cell Sarcoma         Review of Systems   Constitutional: Negative for chills, diaphoresis, fever and unexpected weight change.   HENT: Negative for hearing loss, nosebleeds, sore throat and tinnitus.    Eyes: Negative for pain and visual disturbance.   Respiratory: Negative for cough, shortness of breath and wheezing.    Cardiovascular: Negative for chest pain and palpitations.   Gastrointestinal: Negative for abdominal pain, diarrhea, nausea and vomiting.   Endocrine: Negative for cold intolerance, heat intolerance and polydipsia.   Genitourinary: Negative for difficulty urinating, dysuria and hematuria.   Musculoskeletal: Negative for arthralgias, joint swelling and myalgias.   Skin: Negative for rash and wound.   Allergic/Immunologic: Positive for environmental allergies.   Neurological: Negative for dizziness, syncope and numbness.   Hematological: Does not bruise/bleed easily.   Psychiatric/Behavioral: Positive for sleep disturbance. Negative for dysphoric mood. The patient is nervous/anxious.            Objective:  Physical Exam    Vital signs reviewed.   General: No acute distress.  Eyes: conjunctiva clear; pupils equally round and reactive  ENT: external ears and nose atraumatic; oropharynx clear  CV: no peripheral edema  Resp: normal respiratory effort  Skin: no rashes or wounds; normal turgor  Psych: mood and affect appropriate; recent and remote memory intact    Vitals:    03/20/17 1442   BP: 135/84   Pulse: 81   Weight: 168 lb (76.2 kg)   Height: 64\" (162.6 cm)     Last 2 weights    03/20/17  1442   Weight: 168 lb (76.2 kg)     Body mass index is 28.84 kg/(m^2).     Right Ankle Exam   Swelling: severe    Tenderness   The patient is experiencing tenderness in the lateral malleolus, ATF and medial malleolus.        Range of Motion   Dorsiflexion: 20   Plantar " flexion: 30   Inversion: 25   Eversion: 15     Muscle Strength   Dorsiflexion:  3/5  Plantar flexion:  3/5  Anterior tibial:  3/5  Posterior tibial:  3/5    Tests   Anterior drawer: negative  Varus tilt: positive    Other   Erythema: absent  Scars: absent  Sensation: normal  Pulse: present               Imaging:  Right Ankle X-Ray  Indication: Pain  Views: AP, Lateral, Mortise  Findings:  No fracture  No bony lesion  Soft tissues normal  Normal joint spaces    No prior studies available for comparison.    Assessment:       1. Pain    2. Sprain of other ligament of right ankle, initial encounter          Plan:  1. Discussed plan of care with patient. Will wrap with ace bandage as needed for swelling.  2. Fitted for walking boot right ankle.  3. Prescription provided for knee rollator for the next one month.  4. Will start her on vimovo, instructed to discontinue use of meloxicam and prilosec.   5. Will follow-up in three weeks to reassess. Patient verbalized understanding of all information and agrees with plan of care. Denies all other concerns present at this time.     CESAR query complete.

## 2023-09-08 RX ORDER — ESTRADIOL 0.1 MG/G
CREAM VAGINAL
Qty: 42.5 G | Refills: 1 | Status: SHIPPED | OUTPATIENT
Start: 2023-09-08

## 2023-09-08 NOTE — TELEPHONE ENCOUNTER
Rx Refill Note  Requested Prescriptions     Pending Prescriptions Disp Refills    estradiol (ESTRACE) 0.1 MG/GM vaginal cream [Pharmacy Med Name: ESTRADIOL VAG CRM W/APPL 42.5GM 0.01%] 42.5 g 1     Sig: INSERT 1 GRAM VAGINALLY TWICE A WEEK      Last office visit with prescribing clinician: 1/11/2023   Last telemedicine visit with prescribing clinician: Visit date not found   Next office visit with prescribing clinician: Visit date not found                         Would you like a call back once the refill request has been completed: [] Yes [] No    If the office needs to give you a call back, can they leave a voicemail: [] Yes [] No    Courtney Marquez MA  09/08/23, 08:30 EDT

## 2024-01-22 DIAGNOSIS — K21.9 GASTROESOPHAGEAL REFLUX DISEASE, UNSPECIFIED WHETHER ESOPHAGITIS PRESENT: ICD-10-CM

## 2024-01-22 NOTE — TELEPHONE ENCOUNTER
Rx Refill Note  Requested Prescriptions     Pending Prescriptions Disp Refills    omeprazole (priLOSEC) 40 MG capsule [Pharmacy Med Name: OMEPRAZOLE DR CAPS 40MG] 90 capsule 3     Sig: TAKE 1 CAPSULE DAILY      Last office visit with prescribing clinician: 1/11/2023   Last telemedicine visit with prescribing clinician: Visit date not found   Next office visit with prescribing clinician: Visit date not found                         Would you like a call back once the refill request has been completed: [] Yes [] No    If the office needs to give you a call back, can they leave a voicemail: [] Yes [] No    Aileen Kumar MA  01/22/24, 11:42 EST

## 2024-01-23 RX ORDER — OMEPRAZOLE 40 MG/1
40 CAPSULE, DELAYED RELEASE ORAL DAILY
Qty: 90 CAPSULE | Refills: 3 | Status: SHIPPED | OUTPATIENT
Start: 2024-01-23

## 2024-03-11 DIAGNOSIS — I10 ESSENTIAL HYPERTENSION: ICD-10-CM

## 2024-03-14 RX ORDER — LOSARTAN POTASSIUM AND HYDROCHLOROTHIAZIDE 12.5; 1 MG/1; MG/1
TABLET ORAL
Qty: 90 TABLET | Refills: 3 | OUTPATIENT
Start: 2024-03-14

## 2024-03-14 NOTE — TELEPHONE ENCOUNTER
Called to try to get patient scheduled and she informed me that she had moved out of [place] and is no longer being seen here.

## 2024-04-05 DIAGNOSIS — E55.9 VITAMIN D DEFICIENCY: ICD-10-CM

## 2024-04-08 RX ORDER — CHOLECALCIFEROL (VITAMIN D3) 1250 MCG
50000 CAPSULE ORAL WEEKLY
Qty: 13 CAPSULE | Refills: 3 | Status: SHIPPED | OUTPATIENT
Start: 2024-04-08

## 2024-07-03 RX ORDER — ESTRADIOL 0.1 MG/G
CREAM VAGINAL
Qty: 42.5 G | Refills: 1 | OUTPATIENT
Start: 2024-07-03

## 2025-01-15 DIAGNOSIS — K21.9 GASTROESOPHAGEAL REFLUX DISEASE, UNSPECIFIED WHETHER ESOPHAGITIS PRESENT: ICD-10-CM

## 2025-01-21 RX ORDER — OMEPRAZOLE 40 MG/1
40 CAPSULE, DELAYED RELEASE ORAL DAILY
Qty: 90 CAPSULE | Refills: 3 | Status: SHIPPED | OUTPATIENT
Start: 2025-01-21

## 2025-01-21 NOTE — TELEPHONE ENCOUNTER
Proton Pump Inhibitors Protocol [id number]/15/2025 01:39 AM   Protocol Details Evaluates to true for patients whose most recent eGFR is above 30 and has been documented in the past 12 months.    Recent or future visit with authorizing provider      Rx Refill Note  Requested Prescriptions     Pending Prescriptions Disp Refills    omeprazole (priLOSEC) 40 MG capsule [Pharmacy Med Name: OMEPRAZOLE DR CAPS 40MG] 90 capsule 3     Sig: TAKE 1 CAPSULE DAILY      Last office visit with prescribing clinician: Visit date not found   Last telemedicine visit with prescribing clinician: Visit date not found   Next office visit with prescribing clinician: Visit date not found                         Would you like a call back once the refill request has been completed: [] Yes [] No    If the office needs to give you a call back, can they leave a voicemail: [] Yes [] No    Daniela Saha MA  01/21/25, 08:58 EST

## 2025-04-04 DIAGNOSIS — E55.9 VITAMIN D DEFICIENCY: ICD-10-CM

## 2025-04-04 RX ORDER — CHOLECALCIFEROL (VITAMIN D3) 1250 MCG
50000 CAPSULE ORAL WEEKLY
Qty: 13 CAPSULE | Refills: 3 | OUTPATIENT
Start: 2025-04-04

## 2025-04-04 NOTE — TELEPHONE ENCOUNTER
Rx Refill Note  Requested Prescriptions     Refused Prescriptions Disp Refills    Cholecalciferol (Vitamin D3) 1.25 MG (16419 UT) capsule [Pharmacy Med Name: VIT D-3 CAPS(CHOLECALCIFER) 50,000U] 13 capsule 3     Sig: TAKE 1 CAPSULE ONCE A WEEK     Refused By: GABRIELLE THORNTON     Reason for Refusal: Patient needs an appointment      Last office visit with prescribing clinician: Visit date not found   Last telemedicine visit with prescribing clinician: Visit date not found   Next office visit with prescribing clinician: Visit date not found                         Would you like a call back once the refill request has been completed: [] Yes [] No    If the office needs to give you a call back, can they leave a voicemail: [] Yes [] No    Gabrielle Thornton MA  04/04/25, 08:35 EDT